# Patient Record
Sex: MALE | Race: WHITE | NOT HISPANIC OR LATINO | Employment: OTHER | URBAN - METROPOLITAN AREA
[De-identification: names, ages, dates, MRNs, and addresses within clinical notes are randomized per-mention and may not be internally consistent; named-entity substitution may affect disease eponyms.]

---

## 2023-03-27 ENCOUNTER — HOSPITAL ENCOUNTER (OUTPATIENT)
Dept: DATA CONVERSION | Facility: HOSPITAL | Age: 73
End: 2023-03-27
Attending: PSYCHIATRY & NEUROLOGY | Admitting: PSYCHIATRY & NEUROLOGY
Payer: MEDICARE

## 2023-03-27 DIAGNOSIS — Z00.6 ENCOUNTER FOR EXAMINATION FOR NORMAL COMPARISON AND CONTROL IN CLINICAL RESEARCH PROGRAM: ICD-10-CM

## 2023-06-05 ENCOUNTER — HOSPITAL ENCOUNTER (OUTPATIENT)
Dept: DATA CONVERSION | Facility: HOSPITAL | Age: 73
End: 2023-06-05
Attending: PSYCHIATRY & NEUROLOGY | Admitting: PSYCHIATRY & NEUROLOGY
Payer: MEDICARE

## 2023-06-05 DIAGNOSIS — Z00.6 ENCOUNTER FOR EXAMINATION FOR NORMAL COMPARISON AND CONTROL IN CLINICAL RESEARCH PROGRAM: ICD-10-CM

## 2023-06-19 ENCOUNTER — HOSPITAL ENCOUNTER (OUTPATIENT)
Dept: DATA CONVERSION | Facility: HOSPITAL | Age: 73
End: 2023-06-19
Attending: PSYCHIATRY & NEUROLOGY | Admitting: PSYCHIATRY & NEUROLOGY
Payer: MEDICARE

## 2023-06-19 DIAGNOSIS — Z00.6 ENCOUNTER FOR EXAMINATION FOR NORMAL COMPARISON AND CONTROL IN CLINICAL RESEARCH PROGRAM: ICD-10-CM

## 2023-06-28 LAB
ACTIVATED PARTIAL THROMBOPLASTIN TIME IN PPP BY COAGULATION ASSAY: 29 SEC (ref 27–38)
BASOPHILS (10*3/UL) IN BLOOD BY AUTOMATED COUNT: 0.04 X10E9/L (ref 0–0.1)
BASOPHILS/100 LEUKOCYTES IN BLOOD BY AUTOMATED COUNT: 0.5 % (ref 0–2)
EOSINOPHILS (10*3/UL) IN BLOOD BY AUTOMATED COUNT: 0.07 X10E9/L (ref 0–0.4)
EOSINOPHILS/100 LEUKOCYTES IN BLOOD BY AUTOMATED COUNT: 0.9 % (ref 0–6)
ERYTHROCYTE DISTRIBUTION WIDTH (RATIO) BY AUTOMATED COUNT: 12.8 % (ref 11.5–14.5)
ERYTHROCYTE MEAN CORPUSCULAR HEMOGLOBIN CONCENTRATION (G/DL) BY AUTOMATED: 33.3 G/DL (ref 32–36)
ERYTHROCYTE MEAN CORPUSCULAR VOLUME (FL) BY AUTOMATED COUNT: 90 FL (ref 80–100)
ERYTHROCYTES (10*6/UL) IN BLOOD BY AUTOMATED COUNT: 4.72 X10E12/L (ref 4.5–5.9)
HEMATOCRIT (%) IN BLOOD BY AUTOMATED COUNT: 42.7 % (ref 41–52)
HEMOGLOBIN (G/DL) IN BLOOD: 14.2 G/DL (ref 13.5–17.5)
IMMATURE GRANULOCYTES/100 LEUKOCYTES IN BLOOD BY AUTOMATED COUNT: 0.1 % (ref 0–0.9)
INR IN PPP BY COAGULATION ASSAY: 1 (ref 0.9–1.1)
LEUKOCYTES (10*3/UL) IN BLOOD BY AUTOMATED COUNT: 7.7 X10E9/L (ref 4.4–11.3)
LYMPHOCYTES (10*3/UL) IN BLOOD BY AUTOMATED COUNT: 2.19 X10E9/L (ref 0.8–3)
LYMPHOCYTES/100 LEUKOCYTES IN BLOOD BY AUTOMATED COUNT: 28.5 % (ref 13–44)
MONOCYTES (10*3/UL) IN BLOOD BY AUTOMATED COUNT: 0.68 X10E9/L (ref 0.05–0.8)
MONOCYTES/100 LEUKOCYTES IN BLOOD BY AUTOMATED COUNT: 8.9 % (ref 2–10)
NEUTROPHILS (10*3/UL) IN BLOOD BY AUTOMATED COUNT: 4.69 X10E9/L (ref 1.6–5.5)
NEUTROPHILS/100 LEUKOCYTES IN BLOOD BY AUTOMATED COUNT: 61.1 % (ref 40–80)
PLATELETS (10*3/UL) IN BLOOD AUTOMATED COUNT: 185 X10E9/L (ref 150–450)
PROTHROMBIN TIME (PT) IN PPP BY COAGULATION ASSAY: 11.6 SEC (ref 9.8–12.8)

## 2023-06-29 LAB
CELLS COUNTED TOTAL IN CEREBRAL SPINAL FLUID: 16
CLARITY CEREBRAL SPINAL FLUID: CLEAR
COLOR OF CEREBRAL SPINAL FLUID: COLORLESS
COLOR OF SUPERNATANT CEREBRAL SPINAL FLUID: COLORLESS
ERYTHROCYTES (/UL)  IN CEREBRAL SPINAL FLUID: 4 /UL (ref 0–5)
GLUCOSE (MG/DL) IN CEREBRAL SPINAL FLUID: 69 MG/DL (ref 40–70)
LEUKOCYTES (/UL) IN CEREBRAL SPINAL FLUID: 1 /UL (ref 0–5)
LYMPHOCYTES/100 LEUKOCYTES IN CSF BY MANUAL COUNT: 63 %
MONO/MACROPHAGE/100 LEUKOCYTES IN CSF BY MANUAL COUNT: 38 %
PROTEIN (MG/DL) IN CSF: 54 MG/DL (ref 15–45)
TUBE NUMBER OF CEREBRAL SPINAL FLUID: NORMAL

## 2023-07-10 ENCOUNTER — HOSPITAL ENCOUNTER (OUTPATIENT)
Dept: PREADMISSION TESTING | Age: 73
Discharge: HOME | End: 2023-07-14

## 2023-07-10 VITALS
WEIGHT: 218 LBS | OXYGEN SATURATION: 98 % | HEART RATE: 53 BPM | RESPIRATION RATE: 16 BRPM | TEMPERATURE: 98 F | HEIGHT: 74 IN | DIASTOLIC BLOOD PRESSURE: 74 MMHG | SYSTOLIC BLOOD PRESSURE: 134 MMHG | BODY MASS INDEX: 27.98 KG/M2

## 2023-07-17 ENCOUNTER — ANESTHESIA EVENT (OUTPATIENT)
Dept: OPERATING ROOM | Age: 73
End: 2023-07-17
Payer: MEDICARE

## 2023-07-17 ENCOUNTER — ANESTHESIA (OUTPATIENT)
Dept: OPERATING ROOM | Age: 73
End: 2023-07-17
Payer: MEDICARE

## 2023-07-17 ENCOUNTER — HOSPITAL ENCOUNTER (OUTPATIENT)
Age: 73
Setting detail: OUTPATIENT SURGERY
Discharge: HOME | End: 2023-07-17
Attending: ORTHOPAEDIC SURGERY | Admitting: ORTHOPAEDIC SURGERY
Payer: MEDICARE

## 2023-07-17 VITALS
RESPIRATION RATE: 16 BRPM | HEIGHT: 74 IN | OXYGEN SATURATION: 99 % | SYSTOLIC BLOOD PRESSURE: 146 MMHG | TEMPERATURE: 97 F | DIASTOLIC BLOOD PRESSURE: 74 MMHG | WEIGHT: 218 LBS | BODY MASS INDEX: 27.98 KG/M2 | HEART RATE: 65 BPM

## 2023-07-17 PROCEDURE — 7100000010 HC PHASE II RECOVERY - FIRST 15 MIN: Performed by: ORTHOPAEDIC SURGERY

## 2023-07-17 PROCEDURE — 6370000000 HC RX 637 (ALT 250 FOR IP): Performed by: ANESTHESIOLOGY

## 2023-07-17 PROCEDURE — 3600000004 HC SURGERY LEVEL 4 BASE: Performed by: ORTHOPAEDIC SURGERY

## 2023-07-17 PROCEDURE — 6360000002 HC RX W HCPCS: Performed by: ANESTHESIOLOGY

## 2023-07-17 PROCEDURE — 7100000011 HC PHASE II RECOVERY - ADDTL 15 MIN: Performed by: ORTHOPAEDIC SURGERY

## 2023-07-17 PROCEDURE — 3700000001 HC ADD 15 MINUTES (ANESTHESIA): Performed by: ORTHOPAEDIC SURGERY

## 2023-07-17 PROCEDURE — 2709999900 HC NON-CHARGEABLE SUPPLY: Performed by: ORTHOPAEDIC SURGERY

## 2023-07-17 PROCEDURE — 2580000003 HC RX 258: Performed by: ANESTHESIOLOGY

## 2023-07-17 PROCEDURE — 7100000001 HC PACU RECOVERY - ADDTL 15 MIN: Performed by: ORTHOPAEDIC SURGERY

## 2023-07-17 PROCEDURE — 6360000002 HC RX W HCPCS: Performed by: ORTHOPAEDIC SURGERY

## 2023-07-17 PROCEDURE — 3600000014 HC SURGERY LEVEL 4 ADDTL 15MIN: Performed by: ORTHOPAEDIC SURGERY

## 2023-07-17 PROCEDURE — 2720000010 HC SURG SUPPLY STERILE: Performed by: ORTHOPAEDIC SURGERY

## 2023-07-17 PROCEDURE — 3700000000 HC ANESTHESIA ATTENDED CARE: Performed by: ORTHOPAEDIC SURGERY

## 2023-07-17 PROCEDURE — 7100000000 HC PACU RECOVERY - FIRST 15 MIN: Performed by: ORTHOPAEDIC SURGERY

## 2023-07-17 PROCEDURE — C1713 ANCHOR/SCREW BN/BN,TIS/BN: HCPCS | Performed by: ORTHOPAEDIC SURGERY

## 2023-07-17 PROCEDURE — 2580000003 HC RX 258: Performed by: ORTHOPAEDIC SURGERY

## 2023-07-17 PROCEDURE — 64447 NJX AA&/STRD FEMORAL NRV IMG: CPT | Performed by: ANESTHESIOLOGY

## 2023-07-17 DEVICE — ANCHOR SUTURE BIOCOMP 4.75X19.1 MM SWIVELOCK C: Type: IMPLANTABLE DEVICE | Site: LEG | Status: FUNCTIONAL

## 2023-07-17 DEVICE — DEVICE GRFT FIX 4.75X19.1 MM ANCHR IMPL BIOCOMP SWIVELOCK: Type: IMPLANTABLE DEVICE | Site: LEG | Status: FUNCTIONAL

## 2023-07-17 RX ORDER — LABETALOL HYDROCHLORIDE 5 MG/ML
10 INJECTION, SOLUTION INTRAVENOUS
Status: DISCONTINUED | OUTPATIENT
Start: 2023-07-17 | End: 2023-07-17 | Stop reason: HOSPADM

## 2023-07-17 RX ORDER — DEXAMETHASONE SODIUM PHOSPHATE 10 MG/ML
INJECTION INTRAMUSCULAR; INTRAVENOUS PRN
Status: DISCONTINUED | OUTPATIENT
Start: 2023-07-17 | End: 2023-07-17 | Stop reason: SDUPTHER

## 2023-07-17 RX ORDER — OXYCODONE HYDROCHLORIDE 5 MG/1
10 TABLET ORAL PRN
Status: COMPLETED | OUTPATIENT
Start: 2023-07-17 | End: 2023-07-17

## 2023-07-17 RX ORDER — MIDAZOLAM HYDROCHLORIDE 1 MG/ML
INJECTION INTRAMUSCULAR; INTRAVENOUS PRN
Status: DISCONTINUED | OUTPATIENT
Start: 2023-07-17 | End: 2023-07-17 | Stop reason: SDUPTHER

## 2023-07-17 RX ORDER — ROPIVACAINE HYDROCHLORIDE 5 MG/ML
INJECTION, SOLUTION EPIDURAL; INFILTRATION; PERINEURAL
Status: COMPLETED | OUTPATIENT
Start: 2023-07-17 | End: 2023-07-17

## 2023-07-17 RX ORDER — FENTANYL CITRATE 50 UG/ML
INJECTION, SOLUTION INTRAMUSCULAR; INTRAVENOUS PRN
Status: DISCONTINUED | OUTPATIENT
Start: 2023-07-17 | End: 2023-07-17 | Stop reason: SDUPTHER

## 2023-07-17 RX ORDER — ONDANSETRON 2 MG/ML
4 INJECTION INTRAMUSCULAR; INTRAVENOUS
Status: DISCONTINUED | OUTPATIENT
Start: 2023-07-17 | End: 2023-07-17 | Stop reason: HOSPADM

## 2023-07-17 RX ORDER — DEXTROSE MONOHYDRATE 100 MG/ML
INJECTION, SOLUTION INTRAVENOUS CONTINUOUS PRN
Status: DISCONTINUED | OUTPATIENT
Start: 2023-07-17 | End: 2023-07-17 | Stop reason: HOSPADM

## 2023-07-17 RX ORDER — OXYCODONE HYDROCHLORIDE 5 MG/1
5 TABLET ORAL PRN
Status: COMPLETED | OUTPATIENT
Start: 2023-07-17 | End: 2023-07-17

## 2023-07-17 RX ORDER — SODIUM CHLORIDE 0.9 % (FLUSH) 0.9 %
5-40 SYRINGE (ML) INJECTION PRN
Status: DISCONTINUED | OUTPATIENT
Start: 2023-07-17 | End: 2023-07-17 | Stop reason: HOSPADM

## 2023-07-17 RX ORDER — FENTANYL CITRATE 0.05 MG/ML
25 INJECTION, SOLUTION INTRAMUSCULAR; INTRAVENOUS EVERY 5 MIN PRN
Status: DISCONTINUED | OUTPATIENT
Start: 2023-07-17 | End: 2023-07-17 | Stop reason: HOSPADM

## 2023-07-17 RX ORDER — SODIUM CHLORIDE, SODIUM LACTATE, POTASSIUM CHLORIDE, CALCIUM CHLORIDE 600; 310; 30; 20 MG/100ML; MG/100ML; MG/100ML; MG/100ML
INJECTION, SOLUTION INTRAVENOUS
Status: DISCONTINUED
Start: 2023-07-17 | End: 2023-07-17 | Stop reason: HOSPADM

## 2023-07-17 RX ORDER — SODIUM CHLORIDE 0.9 % (FLUSH) 0.9 %
5-40 SYRINGE (ML) INJECTION EVERY 12 HOURS SCHEDULED
Status: DISCONTINUED | OUTPATIENT
Start: 2023-07-17 | End: 2023-07-17 | Stop reason: HOSPADM

## 2023-07-17 RX ORDER — SODIUM CHLORIDE 9 MG/ML
INJECTION, SOLUTION INTRAVENOUS PRN
Status: DISCONTINUED | OUTPATIENT
Start: 2023-07-17 | End: 2023-07-17 | Stop reason: HOSPADM

## 2023-07-17 RX ORDER — PROPOFOL 10 MG/ML
INJECTION, EMULSION INTRAVENOUS PRN
Status: DISCONTINUED | OUTPATIENT
Start: 2023-07-17 | End: 2023-07-17 | Stop reason: SDUPTHER

## 2023-07-17 RX ORDER — SODIUM CHLORIDE, SODIUM LACTATE, POTASSIUM CHLORIDE, CALCIUM CHLORIDE 600; 310; 30; 20 MG/100ML; MG/100ML; MG/100ML; MG/100ML
INJECTION, SOLUTION INTRAVENOUS CONTINUOUS
Status: DISCONTINUED | OUTPATIENT
Start: 2023-07-17 | End: 2023-07-17 | Stop reason: HOSPADM

## 2023-07-17 RX ORDER — METOCLOPRAMIDE HYDROCHLORIDE 5 MG/ML
10 INJECTION INTRAMUSCULAR; INTRAVENOUS
Status: DISCONTINUED | OUTPATIENT
Start: 2023-07-17 | End: 2023-07-17 | Stop reason: HOSPADM

## 2023-07-17 RX ORDER — IPRATROPIUM BROMIDE AND ALBUTEROL SULFATE 2.5; .5 MG/3ML; MG/3ML
1 SOLUTION RESPIRATORY (INHALATION)
Status: DISCONTINUED | OUTPATIENT
Start: 2023-07-17 | End: 2023-07-17 | Stop reason: HOSPADM

## 2023-07-17 RX ORDER — MEPERIDINE HYDROCHLORIDE 25 MG/ML
12.5 INJECTION INTRAMUSCULAR; INTRAVENOUS; SUBCUTANEOUS EVERY 5 MIN PRN
Status: DISCONTINUED | OUTPATIENT
Start: 2023-07-17 | End: 2023-07-17 | Stop reason: HOSPADM

## 2023-07-17 RX ORDER — GLUCAGON 1 MG/ML
1 KIT INJECTION PRN
Status: DISCONTINUED | OUTPATIENT
Start: 2023-07-17 | End: 2023-07-17 | Stop reason: HOSPADM

## 2023-07-17 RX ORDER — HYDRALAZINE HYDROCHLORIDE 20 MG/ML
10 INJECTION INTRAMUSCULAR; INTRAVENOUS
Status: DISCONTINUED | OUTPATIENT
Start: 2023-07-17 | End: 2023-07-17 | Stop reason: HOSPADM

## 2023-07-17 RX ADMIN — DEXAMETHASONE SODIUM PHOSPHATE 10 MG: 10 INJECTION INTRAMUSCULAR; INTRAVENOUS at 10:03

## 2023-07-17 RX ADMIN — PROPOFOL 200 MG: 10 INJECTION, EMULSION INTRAVENOUS at 09:54

## 2023-07-17 RX ADMIN — OXYCODONE HYDROCHLORIDE 5 MG: 5 TABLET ORAL at 13:56

## 2023-07-17 RX ADMIN — SODIUM CHLORIDE, POTASSIUM CHLORIDE, SODIUM LACTATE AND CALCIUM CHLORIDE: 600; 310; 30; 20 INJECTION, SOLUTION INTRAVENOUS at 08:47

## 2023-07-17 RX ADMIN — FENTANYL CITRATE 50 MCG: 50 INJECTION, SOLUTION INTRAMUSCULAR; INTRAVENOUS at 09:54

## 2023-07-17 RX ADMIN — FENTANYL CITRATE 50 MCG: 50 INJECTION, SOLUTION INTRAMUSCULAR; INTRAVENOUS at 11:00

## 2023-07-17 RX ADMIN — HYDROMORPHONE HYDROCHLORIDE 0.5 MG: 1 INJECTION, SOLUTION INTRAMUSCULAR; INTRAVENOUS; SUBCUTANEOUS at 11:45

## 2023-07-17 RX ADMIN — FENTANYL CITRATE 50 MCG: 50 INJECTION, SOLUTION INTRAMUSCULAR; INTRAVENOUS at 10:10

## 2023-07-17 RX ADMIN — HYDROMORPHONE HYDROCHLORIDE 0.5 MG: 1 INJECTION, SOLUTION INTRAMUSCULAR; INTRAVENOUS; SUBCUTANEOUS at 11:57

## 2023-07-17 RX ADMIN — FENTANYL CITRATE 50 MCG: 50 INJECTION, SOLUTION INTRAMUSCULAR; INTRAVENOUS at 11:17

## 2023-07-17 RX ADMIN — FENTANYL CITRATE 25 MCG: 0.05 INJECTION, SOLUTION INTRAMUSCULAR; INTRAVENOUS at 12:19

## 2023-07-17 RX ADMIN — MIDAZOLAM HYDROCHLORIDE 2 MG: 1 INJECTION, SOLUTION INTRAMUSCULAR; INTRAVENOUS at 09:35

## 2023-07-17 RX ADMIN — ROPIVACAINE HYDROCHLORIDE 25 ML: 5 INJECTION, SOLUTION EPIDURAL; INFILTRATION; PERINEURAL at 09:30

## 2023-07-17 RX ADMIN — CEFAZOLIN 2000 MG: 2 INJECTION, POWDER, FOR SOLUTION INTRAMUSCULAR; INTRAVENOUS at 10:01

## 2023-07-17 ASSESSMENT — PAIN SCALES - GENERAL
PAINLEVEL_OUTOF10: 7
PAINLEVEL_OUTOF10: 4
PAINLEVEL_OUTOF10: 4
PAINLEVEL_OUTOF10: 8
PAINLEVEL_OUTOF10: 7
PAINLEVEL_OUTOF10: 5
PAINLEVEL_OUTOF10: 7
PAINLEVEL_OUTOF10: 4
PAINLEVEL_OUTOF10: 4
PAINLEVEL_OUTOF10: 0
PAINLEVEL_OUTOF10: 4
PAINLEVEL_OUTOF10: 6

## 2023-07-17 ASSESSMENT — PAIN DESCRIPTION - ORIENTATION
ORIENTATION: LEFT

## 2023-07-17 ASSESSMENT — PAIN DESCRIPTION - LOCATION
LOCATION: KNEE
LOCATION: LEG
LOCATION: KNEE

## 2023-07-17 ASSESSMENT — PAIN DESCRIPTION - DESCRIPTORS
DESCRIPTORS: SORE

## 2023-07-17 NOTE — PROGRESS NOTES
Pt states pain \"is tolerable now\" at \"4\" on scale. \" Left knee dressing clean and dry, ice and immobilizer maintained, Left pedal 2+. Left toes pink warm and mobile. Pt states left leg feels \"not numb not normal a little tingly. \"

## 2023-07-17 NOTE — OP NOTE
Operative Note      Patient: Sherren Cook  YOB: 1950  MRN: [id number]    Date of Procedure: 7/17/2023    Pre-Op Diagnosis Codes:     * Quadriceps muscle strain, left, initial encounter [K03.013F]    Post-Op Diagnosis: Same       Procedure(s):  LEFT KNEE OPEN QUADRICEPS TENDON REPAIR. Surgeon(s):  Charis Fisher MD    Assistant:   Physician Assistant: Christina Capps PA-C    Anesthesia: General    Estimated Blood Loss (mL): less than 50     Complications: None    Specimens:   * No specimens in log *    Implants:  Implant Name Type Inv. Item Serial No.  Lot No. LRB No. Used Action   DEVICE GRFT FIX 4.75X19.1 MM [facility] [id number]  DEVICE GRFT FIX 4.75X19.1 MM [facility] IMPL [employer] [id number] Left 1 Implanted   [facility] SUTURE BIOCOMP 4.75X19.1 MM [employer] [id number]  [facility] SUTURE BIOCOMP 4.75X19.1 MM [employer] [id number] Left 1 Implanted   [facility] SUTURE BIOCOMP 4.75X19.1 MM Raymona Grimaldo [id number]  [facility] SUTURE BIOCOMP 4.75X19.1 MM [employer] [id number] Left 1 Implanted         Drains: * No LDAs found *    Findings: Complete quadriceps tendon rupture        Detailed Description of Procedure: Indications: This is a 68-year-old male who sustained a left quadriceps tendon rupture. An MRI confirmed this along with the clinical exam.  Because of the nature of the injury a quadriceps tendon repair was offered. The procedure was explained along the risks, benefits and alternatives being reviewed. Potential risk including not limited to infection, neurovascular complication, failure of the repair, arthrofibrosis as well as pain and the potential need for reoperation were all discussed with the patient and they consented the procedure. Operative procedure: And adductor canal block was administered per anesthesia. The patient was brought the operative suite placed in supine position.   A general anesthetic was

## 2023-07-17 NOTE — ANESTHESIA PROCEDURE NOTES
Peripheral Block    Patient location during procedure: pre-op  Reason for block: post-op pain management and at surgeon's request  Start time: 7/17/2023 9:30 AM  End time: 7/17/2023 9:40 AM  Staffing  Performed: anesthesiologist   Anesthesiologist: Mony Mccormack MD  Preanesthetic Checklist  Completed: patient identified, IV checked, site marked, risks and benefits discussed, surgical/procedural consents, equipment checked, pre-op evaluation, timeout performed, anesthesia consent given, oxygen available and monitors applied/VS acknowledged  Peripheral Block   Patient position: supine  Prep: ChloraPrep  Provider prep: mask and sterile gloves (Sterile probe cover)  Patient monitoring: cardiac monitor, continuous pulse ox, frequent blood pressure checks and IV access  Block type: Femoral  Adductor canal  Laterality: left  Injection technique: single-shot  Guidance: nerve stimulator and ultrasound guided  Local infiltration: ropivacaine  Infiltration strength: 0.5 %  Local infiltration: ropivacaine  Dose: 25 mL    Needle   Needle type: combined needle/nerve stimulator   Needle gauge: 22 G  Needle localization: anatomical landmarks and ultrasound guidance  Needle length: 5 cm  Assessment   Injection assessment: negative aspiration for heme, no paresthesia on injection and local visualized surrounding nerve on ultrasound  Paresthesia pain: immediately resolved  Slow fractionated injection: yes  Hemodynamics: stable  Real-time US image taken/store: yes    Additional Notes  Ultrasound image printed and saved in patient chart.     Sterile probe cover used    Medications Administered  ropivacaine (NAROPIN) injection 0.5% - Perineural   25 mL - 7/17/2023 9:30:00 AM

## 2023-07-17 NOTE — ANESTHESIA POSTPROCEDURE EVALUATION
Department of Anesthesiology  Postprocedure Note    Patient: Asher Maria  MRN: [id number]  YOB: 1950  Date of evaluation: 7/17/2023      Procedure Summary     Date: 07/17/23 Room / Location: [address]    Anesthesia Start: 1302 Anesthesia Stop: 3639    Procedure: LEFT KNEE OPEN [address]  (Left) Diagnosis:       Quadriceps muscle strain, left, initial encounter      (Quadriceps muscle strain, left, initial encounter [Z82.648O])    Surgeons: Oswaldo Cosme MD Responsible Provider: Manuela Contreras MD    Anesthesia Type: General, Regional ASA Status: 2          Anesthesia Type: General, Regional    Corinne Phase I:      Corinne Phase II:        Anesthesia Post Evaluation    Patient location during evaluation: bedside  Patient participation: complete - patient participated  Level of consciousness: awake and awake and alert  Airway patency: patent  Nausea & Vomiting: no nausea and no vomiting  Complications: no  Cardiovascular status: blood pressure returned to baseline and hemodynamically stable  Respiratory status: acceptable  Hydration status: euvolemic

## 2023-07-17 NOTE — DISCHARGE INSTRUCTIONS
Medication given may have significant effects after discharge. Therefore on the day of surgery:  1) you must be accompanied by a responsible adult upon discharge and for 24 hours after surgery. Do not drive a motor vehicle, operate machinery, power tools or appliance, drink alcoholic beverages, or make critical decisions for 24 hours  2) Be aware of dizziness, which may cause a fall. Change positions slowly. 3) Eating: you may resume your regular diet but it is better to increase intake slowly with mild foods and working up to your regular diet. No greasy, fried or spicy foods today. 4) Nausea/Vomiting: Nausea and vomiting may occur as you become more active or begin to increase food intake. If this should happen, decrease activity and return to liquids. If the problem persists, call your surgeon  5) Pain: Your surgeon may have given you a prescription for pain medication. Take pain medication with food as prescribed. Pain medication may cause constipation, so drink plenty of fluids. If your pain medication does not provide adequate relief, call your surgeon  6) Urinating: Notify your surgeon if you have not urinated within 12 hours after discharge  7) Ice: Apply ice to operative site for 20 min 5-6 times a day or use Polar care as instructed  8) Dressing:   []   Remove dressing in 24hr    []  Remove dressing in 48hr   []  Leave open to air after initial dressing is taken off and incision is dry  Do not remove the steri-strips. (no bath/ hot tubs/ pools)   []  Leave dressing in place.  Keep dressing/ incision clean and dry    9) Activity    Shoulder/ elbow/Hand   []  Elevate extremity    []  Sling   [] at all times (except for exercises and showering)  [] as needed only for comfort   [] Begin daily motion exercises out of sling as instructed   []  Bend and flex fingers/ wrist/elbow frequently   [] other   Knee/ Ankle/ Foot   [] elevate extremity   [] crutches        [] non-weight bearing to operative extremity

## 2023-07-17 NOTE — H&P
Interval History and Physical    I have interviewed and examined the patient and reviewed the recent History and Physical.  There have been no changes to the recent H&P documentation. The patient understands the planned operation and its associated risks and benefits and agrees to proceed. The surgical consent form has been signed. There were no vitals taken for this visit.      Electronically signed by Betsy Silva MD on 7/17/2023 at 8:17 AM

## 2023-08-14 ENCOUNTER — TELEPHONE (OUTPATIENT)
Dept: PRIMARY CARE | Facility: CLINIC | Age: 73
End: 2023-08-14
Payer: MEDICARE

## 2023-08-14 DIAGNOSIS — Z11.59 ENCOUNTER FOR HEPATITIS C SCREENING TEST FOR LOW RISK PATIENT: Primary | ICD-10-CM

## 2023-08-14 DIAGNOSIS — Z11.4 ENCOUNTER FOR SCREENING FOR HIV: ICD-10-CM

## 2023-08-14 DIAGNOSIS — E78.5 BORDERLINE HYPERLIPIDEMIA: ICD-10-CM

## 2023-08-14 DIAGNOSIS — Z12.5 PROSTATE CANCER SCREENING: ICD-10-CM

## 2023-08-14 PROBLEM — J32.9 SINOBRONCHITIS: Status: ACTIVE | Noted: 2023-08-14

## 2023-08-14 PROBLEM — J18.9 LEFT LOWER LOBE PNEUMONIA: Status: RESOLVED | Noted: 2023-08-14 | Resolved: 2023-08-14

## 2023-08-14 PROBLEM — S76.119A RUPTURE OF QUADRICEPS TENDON: Status: RESOLVED | Noted: 2023-08-14 | Resolved: 2023-08-14

## 2023-08-14 PROBLEM — J40 SINOBRONCHITIS: Status: ACTIVE | Noted: 2023-08-14

## 2023-08-16 ENCOUNTER — LAB (OUTPATIENT)
Dept: LAB | Facility: LAB | Age: 73
End: 2023-08-16
Payer: MEDICARE

## 2023-08-16 DIAGNOSIS — E78.5 BORDERLINE HYPERLIPIDEMIA: ICD-10-CM

## 2023-08-16 DIAGNOSIS — G89.29 CHRONIC LOW BACK PAIN, UNSPECIFIED BACK PAIN LATERALITY, UNSPECIFIED WHETHER SCIATICA PRESENT: Primary | ICD-10-CM

## 2023-08-16 DIAGNOSIS — Z12.5 PROSTATE CANCER SCREENING: ICD-10-CM

## 2023-08-16 DIAGNOSIS — M54.50 CHRONIC LOW BACK PAIN, UNSPECIFIED BACK PAIN LATERALITY, UNSPECIFIED WHETHER SCIATICA PRESENT: Primary | ICD-10-CM

## 2023-08-16 DIAGNOSIS — Z11.4 ENCOUNTER FOR SCREENING FOR HIV: ICD-10-CM

## 2023-08-16 DIAGNOSIS — Z11.59 ENCOUNTER FOR HEPATITIS C SCREENING TEST FOR LOW RISK PATIENT: ICD-10-CM

## 2023-08-16 LAB
ALANINE AMINOTRANSFERASE (SGPT) (U/L) IN SER/PLAS: 11 U/L (ref 10–52)
ALBUMIN (G/DL) IN SER/PLAS: 3.7 G/DL (ref 3.4–5)
ALKALINE PHOSPHATASE (U/L) IN SER/PLAS: 67 U/L (ref 33–136)
ANION GAP IN SER/PLAS: 9 MMOL/L (ref 10–20)
ASPARTATE AMINOTRANSFERASE (SGOT) (U/L) IN SER/PLAS: 24 U/L (ref 9–39)
BILIRUBIN TOTAL (MG/DL) IN SER/PLAS: 0.7 MG/DL (ref 0–1.2)
CALCIUM (MG/DL) IN SER/PLAS: 8.9 MG/DL (ref 8.6–10.3)
CARBON DIOXIDE, TOTAL (MMOL/L) IN SER/PLAS: 30 MMOL/L (ref 21–32)
CHLORIDE (MMOL/L) IN SER/PLAS: 106 MMOL/L (ref 98–107)
CHOLESTEROL (MG/DL) IN SER/PLAS: 155 MG/DL (ref 0–199)
CHOLESTEROL IN HDL (MG/DL) IN SER/PLAS: 45.6 MG/DL
CHOLESTEROL/HDL RATIO: 3.4
CREATININE (MG/DL) IN SER/PLAS: 1 MG/DL (ref 0.5–1.3)
ERYTHROCYTE DISTRIBUTION WIDTH (RATIO) BY AUTOMATED COUNT: 12.8 % (ref 11.5–14.5)
ERYTHROCYTE MEAN CORPUSCULAR HEMOGLOBIN CONCENTRATION (G/DL) BY AUTOMATED: 33.7 G/DL (ref 32–36)
ERYTHROCYTE MEAN CORPUSCULAR VOLUME (FL) BY AUTOMATED COUNT: 92 FL (ref 80–100)
ERYTHROCYTES (10*6/UL) IN BLOOD BY AUTOMATED COUNT: 4.42 X10E12/L (ref 4.5–5.9)
GFR MALE: 78 ML/MIN/1.73M2
GLUCOSE (MG/DL) IN SER/PLAS: 110 MG/DL (ref 74–99)
HEMATOCRIT (%) IN BLOOD BY AUTOMATED COUNT: 40.6 % (ref 41–52)
HEMOGLOBIN (G/DL) IN BLOOD: 13.7 G/DL (ref 13.5–17.5)
HEPATITIS C VIRUS AB PRESENCE IN SERUM: NONREACTIVE
HIV 1/ 2 AG/AB SCREEN: NONREACTIVE
LDL: 99 MG/DL (ref 0–99)
LEUKOCYTES (10*3/UL) IN BLOOD BY AUTOMATED COUNT: 4.6 X10E9/L (ref 4.4–11.3)
PLATELETS (10*3/UL) IN BLOOD AUTOMATED COUNT: 152 X10E9/L (ref 150–450)
POTASSIUM (MMOL/L) IN SER/PLAS: 4.1 MMOL/L (ref 3.5–5.3)
PROSTATE SPECIFIC AG (NG/ML) IN SER/PLAS: 2.52 NG/ML (ref 0–4)
PROTEIN TOTAL: 6 G/DL (ref 6.4–8.2)
SODIUM (MMOL/L) IN SER/PLAS: 141 MMOL/L (ref 136–145)
TRIGLYCERIDE (MG/DL) IN SER/PLAS: 54 MG/DL (ref 0–149)
UREA NITROGEN (MG/DL) IN SER/PLAS: 20 MG/DL (ref 6–23)
VLDL: 11 MG/DL (ref 0–40)

## 2023-08-16 PROCEDURE — 87389 HIV-1 AG W/HIV-1&-2 AB AG IA: CPT

## 2023-08-16 PROCEDURE — 80053 COMPREHEN METABOLIC PANEL: CPT

## 2023-08-16 PROCEDURE — 86803 HEPATITIS C AB TEST: CPT

## 2023-08-16 PROCEDURE — 85027 COMPLETE CBC AUTOMATED: CPT

## 2023-08-16 PROCEDURE — 36415 COLL VENOUS BLD VENIPUNCTURE: CPT

## 2023-08-16 PROCEDURE — G0103 PSA SCREENING: HCPCS

## 2023-08-16 PROCEDURE — 80061 LIPID PANEL: CPT

## 2023-08-21 ENCOUNTER — OFFICE VISIT (OUTPATIENT)
Dept: PRIMARY CARE | Facility: CLINIC | Age: 73
End: 2023-08-21
Payer: MEDICARE

## 2023-08-21 VITALS
RESPIRATION RATE: 16 BRPM | BODY MASS INDEX: 28.17 KG/M2 | DIASTOLIC BLOOD PRESSURE: 76 MMHG | HEART RATE: 56 BPM | TEMPERATURE: 98.1 F | OXYGEN SATURATION: 94 % | SYSTOLIC BLOOD PRESSURE: 122 MMHG | WEIGHT: 219.5 LBS | HEIGHT: 74 IN

## 2023-08-21 DIAGNOSIS — Z00.00 ROUTINE GENERAL MEDICAL EXAMINATION AT HEALTH CARE FACILITY: Primary | ICD-10-CM

## 2023-08-21 DIAGNOSIS — S76.112D RUPTURE OF LEFT QUADRICEPS TENDON, SUBSEQUENT ENCOUNTER: ICD-10-CM

## 2023-08-21 PROBLEM — M75.100 TORN ROTATOR CUFF: Status: RESOLVED | Noted: 2023-08-21 | Resolved: 2023-08-21

## 2023-08-21 PROCEDURE — G0439 PPPS, SUBSEQ VISIT: HCPCS | Performed by: STUDENT IN AN ORGANIZED HEALTH CARE EDUCATION/TRAINING PROGRAM

## 2023-08-21 PROCEDURE — 1170F FXNL STATUS ASSESSED: CPT | Performed by: STUDENT IN AN ORGANIZED HEALTH CARE EDUCATION/TRAINING PROGRAM

## 2023-08-21 PROCEDURE — 1159F MED LIST DOCD IN RCRD: CPT | Performed by: STUDENT IN AN ORGANIZED HEALTH CARE EDUCATION/TRAINING PROGRAM

## 2023-08-21 PROCEDURE — 1160F RVW MEDS BY RX/DR IN RCRD: CPT | Performed by: STUDENT IN AN ORGANIZED HEALTH CARE EDUCATION/TRAINING PROGRAM

## 2023-08-21 PROCEDURE — 1036F TOBACCO NON-USER: CPT | Performed by: STUDENT IN AN ORGANIZED HEALTH CARE EDUCATION/TRAINING PROGRAM

## 2023-08-21 SDOH — HEALTH STABILITY: PHYSICAL HEALTH: ON AVERAGE, HOW MANY DAYS PER WEEK DO YOU ENGAGE IN MODERATE TO STRENUOUS EXERCISE (LIKE A BRISK WALK)?: 5 DAYS

## 2023-08-21 SDOH — ECONOMIC STABILITY: FOOD INSECURITY: WITHIN THE PAST 12 MONTHS, YOU WORRIED THAT YOUR FOOD WOULD RUN OUT BEFORE YOU GOT MONEY TO BUY MORE.: NEVER TRUE

## 2023-08-21 SDOH — ECONOMIC STABILITY: HOUSING INSECURITY
IN THE LAST 12 MONTHS, WAS THERE A TIME WHEN YOU DID NOT HAVE A STEADY PLACE TO SLEEP OR SLEPT IN A SHELTER (INCLUDING NOW)?: NO

## 2023-08-21 SDOH — ECONOMIC STABILITY: INCOME INSECURITY: IN THE LAST 12 MONTHS, WAS THERE A TIME WHEN YOU WERE NOT ABLE TO PAY THE MORTGAGE OR RENT ON TIME?: NO

## 2023-08-21 SDOH — ECONOMIC STABILITY: TRANSPORTATION INSECURITY
IN THE PAST 12 MONTHS, HAS LACK OF TRANSPORTATION KEPT YOU FROM MEETINGS, WORK, OR FROM GETTING THINGS NEEDED FOR DAILY LIVING?: NO

## 2023-08-21 SDOH — ECONOMIC STABILITY: FOOD INSECURITY: WITHIN THE PAST 12 MONTHS, THE FOOD YOU BOUGHT JUST DIDN'T LAST AND YOU DIDN'T HAVE MONEY TO GET MORE.: NEVER TRUE

## 2023-08-21 SDOH — HEALTH STABILITY: PHYSICAL HEALTH: ON AVERAGE, HOW MANY MINUTES DO YOU ENGAGE IN EXERCISE AT THIS LEVEL?: 60 MIN

## 2023-08-21 SDOH — ECONOMIC STABILITY: TRANSPORTATION INSECURITY
IN THE PAST 12 MONTHS, HAS THE LACK OF TRANSPORTATION KEPT YOU FROM MEDICAL APPOINTMENTS OR FROM GETTING MEDICATIONS?: NO

## 2023-08-21 ASSESSMENT — SOCIAL DETERMINANTS OF HEALTH (SDOH)
WITHIN THE LAST YEAR, HAVE TO BEEN RAPED OR FORCED TO HAVE ANY KIND OF SEXUAL ACTIVITY BY YOUR PARTNER OR EX-PARTNER?: NO
DO YOU BELONG TO ANY CLUBS OR ORGANIZATIONS SUCH AS CHURCH GROUPS UNIONS, FRATERNAL OR ATHLETIC GROUPS, OR SCHOOL GROUPS?: YES
WITHIN THE LAST YEAR, HAVE YOU BEEN HUMILIATED OR EMOTIONALLY ABUSED IN OTHER WAYS BY YOUR PARTNER OR EX-PARTNER?: NO
WITHIN THE LAST YEAR, HAVE YOU BEEN KICKED, HIT, SLAPPED, OR OTHERWISE PHYSICALLY HURT BY YOUR PARTNER OR EX-PARTNER?: NO
IN THE PAST 12 MONTHS, HAS THE ELECTRIC, GAS, OIL, OR WATER COMPANY THREATENED TO SHUT OFF SERVICE IN YOUR HOME?: NO
HOW HARD IS IT FOR YOU TO PAY FOR THE VERY BASICS LIKE FOOD, HOUSING, MEDICAL CARE, AND HEATING?: NOT HARD AT ALL
HOW OFTEN DO YOU GET TOGETHER WITH FRIENDS OR RELATIVES?: THREE TIMES A WEEK
HOW OFTEN DO YOU ATTEND CHURCH OR RELIGIOUS SERVICES?: MORE THAN 4 TIMES PER YEAR
WITHIN THE LAST YEAR, HAVE YOU BEEN AFRAID OF YOUR PARTNER OR EX-PARTNER?: NO
HOW OFTEN DO YOU ATTENT MEETINGS OF THE CLUB OR ORGANIZATION YOU BELONG TO?: MORE THAN 4 TIMES PER YEAR
IN A TYPICAL WEEK, HOW MANY TIMES DO YOU TALK ON THE PHONE WITH FAMILY, FRIENDS, OR NEIGHBORS?: MORE THAN THREE TIMES A WEEK

## 2023-08-21 ASSESSMENT — ACTIVITIES OF DAILY LIVING (ADL)
GROCERY_SHOPPING: INDEPENDENT
TAKING_MEDICATION: INDEPENDENT
MANAGING_FINANCES: INDEPENDENT
DOING_HOUSEWORK: INDEPENDENT
DRESSING: INDEPENDENT
BATHING: INDEPENDENT

## 2023-08-21 ASSESSMENT — ENCOUNTER SYMPTOMS
OCCASIONAL FEELINGS OF UNSTEADINESS: 0
CONSTIPATION: 0
NAUSEA: 0
DEPRESSION: 0
FLANK PAIN: 0
BLOOD IN STOOL: 0
HEMATURIA: 0
DIARRHEA: 0
PALPITATIONS: 0
DIZZINESS: 0
SINUS PAIN: 0
ARTHRALGIAS: 0
FATIGUE: 0
LIGHT-HEADEDNESS: 0
SHORTNESS OF BREATH: 0
APPETITE CHANGE: 0
MYALGIAS: 0
VOMITING: 0
LOSS OF SENSATION IN FEET: 0
ABDOMINAL PAIN: 0
FEVER: 0
RHINORRHEA: 0

## 2023-08-21 ASSESSMENT — PATIENT HEALTH QUESTIONNAIRE - PHQ9
SUM OF ALL RESPONSES TO PHQ9 QUESTIONS 1 AND 2: 0
2. FEELING DOWN, DEPRESSED OR HOPELESS: NOT AT ALL
1. LITTLE INTEREST OR PLEASURE IN DOING THINGS: NOT AT ALL

## 2023-08-21 ASSESSMENT — LIFESTYLE VARIABLES
HOW OFTEN DO YOU HAVE A DRINK CONTAINING ALCOHOL: NEVER
HOW MANY STANDARD DRINKS CONTAINING ALCOHOL DO YOU HAVE ON A TYPICAL DAY: PATIENT DOES NOT DRINK

## 2023-08-21 NOTE — PROGRESS NOTES
Subjective   Wiliam Keenan is a 73 y.o. male who is here for a routine exam.  Active Problem List      Comprehensive Medical/Surgical/Social/Family History  Past Medical History:   Diagnosis Date    Left lower lobe pneumonia 08/14/2023    Other specified health status     No pertinent past medical history    Rupture of quadriceps tendon 08/14/2023    Sprain of left rotator cuff capsule 01/04/2016     Past Surgical History:   Procedure Laterality Date    OTHER SURGICAL HISTORY  05/17/2021    Hip surgery    OTHER SURGICAL HISTORY  05/17/2021    Back surgery    OTHER SURGICAL HISTORY  05/17/2021    Shoulder surgery     Social History     Social History Narrative    Not on file         Allergies and Medications  Bee venom protein (honey bee) and Poison ivy extract  No current outpatient medications on file prior to visit.     No current facility-administered medications on file prior to visit.       Lifestyle  Diet: Healthy diet, well-balanced  Exercise: Exercises regularly.  Currently limited due to patient's recent injury.  But recovering well.  Tobacco: None  Alcohol: None  Stress/Work: No significant stressors      Colorectal Screening:   colonoscopy  Date: 2021  Nocturia: None  Erectile dysfunction: None    Review of Systems   Constitutional:  Negative for appetite change, fatigue and fever.   HENT:  Negative for ear discharge, ear pain, hearing loss, postnasal drip, rhinorrhea and sinus pain.    Eyes:  Negative for visual disturbance.   Respiratory:  Negative for shortness of breath.    Cardiovascular:  Negative for chest pain, palpitations and leg swelling.   Gastrointestinal:  Negative for abdominal pain, blood in stool, constipation, diarrhea, nausea and vomiting.   Genitourinary:  Negative for flank pain and hematuria.   Musculoskeletal:  Negative for arthralgias and myalgias.   Skin:  Negative for rash.   Neurological:  Negative for dizziness and light-headedness.   All other systems reviewed and are  "negative.      Objective   /76 (BP Location: Right arm, Patient Position: Sitting)   Pulse 56   Temp 36.7 °C (98.1 °F) (Temporal)   Resp 16   Ht 1.88 m (6' 2\")   Wt 99.6 kg (219 lb 8 oz)   SpO2 94%   BMI 28.18 kg/m²     Physical Exam  Vitals reviewed.   Constitutional:       General: He is not in acute distress.     Appearance: Normal appearance. He is normal weight. He is not toxic-appearing.   HENT:      Head: Normocephalic and atraumatic.      Right Ear: Tympanic membrane and ear canal normal.      Left Ear: Tympanic membrane and ear canal normal.      Nose: Nose normal. No congestion or rhinorrhea.      Mouth/Throat:      Mouth: Mucous membranes are dry.   Eyes:      General: No scleral icterus.     Extraocular Movements: Extraocular movements intact.      Conjunctiva/sclera: Conjunctivae normal.      Pupils: Pupils are equal, round, and reactive to light.   Cardiovascular:      Rate and Rhythm: Normal rate and regular rhythm.      Heart sounds: No murmur heard.     No friction rub. No gallop.   Pulmonary:      Effort: Pulmonary effort is normal. No respiratory distress.      Breath sounds: Normal breath sounds. No wheezing, rhonchi or rales.   Abdominal:      General: Abdomen is flat. There is no distension.      Palpations: Abdomen is soft.      Tenderness: There is no abdominal tenderness. There is no guarding.   Musculoskeletal:         General: Normal range of motion.      Cervical back: Normal range of motion and neck supple.      Right lower leg: No edema.      Left lower leg: No edema.   Lymphadenopathy:      Cervical: No cervical adenopathy.   Skin:     General: Skin is warm and dry.   Neurological:      General: No focal deficit present.      Mental Status: He is alert and oriented to person, place, and time.   Psychiatric:         Mood and Affect: Mood normal.         Behavior: Behavior normal.         Assessment/Plan   Problem List Items Addressed This Visit       RESOLVED: Rupture of " quadriceps tendon     Other Visit Diagnoses       Routine general medical examination at health care facility    -  Primary          Reviewed Social Determinants of health with patient, discussed healthy lifestyle including 150 minutes of physical activity per week  Ordered/Reviewed baseline labwork -CBC, CMP, Lipid Panel  Immunizations Up-to-Date  Colonoscopy 2021  Congratulate patient on healthy lifestyle.  No follow-up necessary with lab work.  Continue follow-up with physical therapy.  Follow-up as new concerns arise.

## 2023-09-07 VITALS
RESPIRATION RATE: 14 BRPM | WEIGHT: 221.34 LBS | BODY MASS INDEX: 28.42 KG/M2 | TEMPERATURE: 97.5 F | HEART RATE: 53 BPM | SYSTOLIC BLOOD PRESSURE: 125 MMHG | OXYGEN SATURATION: 98 % | DIASTOLIC BLOOD PRESSURE: 73 MMHG

## 2023-09-07 VITALS
RESPIRATION RATE: 16 BRPM | HEART RATE: 52 BPM | SYSTOLIC BLOOD PRESSURE: 130 MMHG | OXYGEN SATURATION: 98 % | TEMPERATURE: 97.5 F | DIASTOLIC BLOOD PRESSURE: 72 MMHG | WEIGHT: 218.92 LBS | BODY MASS INDEX: 28.11 KG/M2

## 2023-09-11 ENCOUNTER — HOSPITAL ENCOUNTER (OUTPATIENT)
Dept: DATA CONVERSION | Facility: HOSPITAL | Age: 73
End: 2023-09-11
Attending: PSYCHIATRY & NEUROLOGY | Admitting: PSYCHIATRY & NEUROLOGY
Payer: MEDICARE

## 2023-09-11 DIAGNOSIS — Z00.6 ENCOUNTER FOR EXAMINATION FOR NORMAL COMPARISON AND CONTROL IN CLINICAL RESEARCH PROGRAM: ICD-10-CM

## 2023-09-29 VITALS — BODY MASS INDEX: 27.77 KG/M2 | WEIGHT: 216.27 LBS

## 2023-10-01 PROBLEM — R06.2 WHEEZING: Status: ACTIVE | Noted: 2023-10-01

## 2023-10-01 PROBLEM — J45.909 AIRWAY HYPERREACTIVITY (HHS-HCC): Status: ACTIVE | Noted: 2023-10-01

## 2023-10-01 PROBLEM — R05.8 PRODUCTIVE COUGH: Status: ACTIVE | Noted: 2023-10-01

## 2023-10-02 PROBLEM — S76.119D: Status: ACTIVE | Noted: 2023-10-02

## 2023-10-03 ENCOUNTER — OFFICE VISIT (OUTPATIENT)
Dept: ORTHOPEDIC SURGERY | Facility: CLINIC | Age: 73
End: 2023-10-03
Payer: MEDICARE

## 2023-10-03 ENCOUNTER — TREATMENT (OUTPATIENT)
Dept: PHYSICAL THERAPY | Facility: CLINIC | Age: 73
End: 2023-10-03
Payer: MEDICARE

## 2023-10-03 DIAGNOSIS — S76.119D QUADRICEPS TENDON RUPTURE, UNSPECIFIED LATERALITY, SUBSEQUENT ENCOUNTER: Primary | ICD-10-CM

## 2023-10-03 PROCEDURE — 1036F TOBACCO NON-USER: CPT | Performed by: ORTHOPAEDIC SURGERY

## 2023-10-03 PROCEDURE — 99024 POSTOP FOLLOW-UP VISIT: CPT | Performed by: ORTHOPAEDIC SURGERY

## 2023-10-03 PROCEDURE — 1126F AMNT PAIN NOTED NONE PRSNT: CPT | Performed by: ORTHOPAEDIC SURGERY

## 2023-10-03 PROCEDURE — 97110 THERAPEUTIC EXERCISES: CPT | Mod: GP

## 2023-10-03 PROCEDURE — 1160F RVW MEDS BY RX/DR IN RCRD: CPT | Performed by: ORTHOPAEDIC SURGERY

## 2023-10-03 PROCEDURE — 1159F MED LIST DOCD IN RCRD: CPT | Performed by: ORTHOPAEDIC SURGERY

## 2023-10-03 ASSESSMENT — PAIN SCALES - GENERAL: PAINLEVEL_OUTOF10: 0 - NO PAIN

## 2023-10-03 ASSESSMENT — PAIN - FUNCTIONAL ASSESSMENT
PAIN_FUNCTIONAL_ASSESSMENT: 0-10
PAIN_FUNCTIONAL_ASSESSMENT: 0-10

## 2023-10-03 ASSESSMENT — ENCOUNTER SYMPTOMS
DEPRESSION: 0
LOSS OF SENSATION IN FEET: 0
OCCASIONAL FEELINGS OF UNSTEADINESS: 1

## 2023-10-03 NOTE — PROGRESS NOTES
History of Present Illness  Chief Complaint   Patient presents with    Left Thigh - Follow-up       Patient returns today for evaluation Status Post left quadriceps tendon repair.  The patient notes improvement in pain.  His knee feels tight.  He is approxi-10 weeks postop.  The patient denies any significant numbness or tingling of calf pain.  He is going to therapy and is wearing the brace.     Exam  side: left Lower Extremity :  Incision healing nicely, no erythema or drainage  Range of motion: 0-105 degrees  Intact flexion and extension of digits  Neurovascular exam normal distally  2+ dorsalis pedis pulse and good cap refill     Radiographs  No images are attached to the encounter.     Assessment  Patient status post left quadriceps tendon repair     Plan  Immobilization: Continue the brace until his quadricep strength is 80% of the uninvolved side  Weight bearing: WBAT (Weight Bearing as Tolerated)  Reviewed rehab /return to activities  Continue physical therapy  Follow up in weeks 8  Questions answered

## 2023-10-03 NOTE — PROGRESS NOTES
"Physical Therapy    Physical Therapy Treatment    Patient Name: Wiliam Keenan  MRN: [id number]  Today's Date: 10/3/2023  Insurance reviewed   Visit number: 11   Approved number of visits: 16   Authorization not required after evaluation   Medicare  100% UCR  unlimited V/Y; KX p 20th   Onset Date: 07/ 17/ 2023     Time Calculation  Start Time: 0850  Stop Time: 0930  Time Calculation (min): 40 min      Assessment:  Pt had no complaints of increased pain during or after exercises. No skin abnormalities noted after modalities. Slight improvement in AROM for left knee flexion since last visit. Pt would benefit from PT to continue to address impairments in order to improve strength, flexibility, gait, and body mechanics and to decrease symptoms and increase overall function.  PT Assessment  PT Assessment Results: Decreased strength, Decreased range of motion    Plan:  Continue to progress left quad strengthening as regan  OP PT Plan  PT Plan: Skilled PT    Current Problem  1. Quadriceps tendon rupture, unspecified laterality, subsequent encounter            Subjective   General   Pt denies pain in left knee. He states that it has been feeling \"fantastic\". He states that he still has to use the arms of the chair to get up but does note an overall improvement in functional strength.   Precautions  Precautions:   per protocol in chart  hx of hip replacement, shoulder surgery, back surgery.    Date of surgery: 7/17/23  Quad tendon repair  Precautions  STEADI Fall Risk Score (The score of 4 or more indicates an increased risk of falling): 1       Pain  Pain Assessment: 0-10  0    Objective   Left knee flexion   AAROM 100  AROM 99        Treatments:    Therapeutic exercise (93021): timed minutes 40, units 3 . Obj measures and goals review  Rocking on bike SH 21.5 L0 x5'   QS x 7 min NMES  SLR x20 3 min NMES  heel slide with strap x15  Standing hip extension/abd x15 ea  Knee extension 0-90 2# 2x10  weight shifts with brace unlocked " "15x3\" NT  Mini squats FT/FA x10 ea   multi angle extension isometrics between 80-15 5\" hold ea NT  calf raises x15  fwd mini lunges x15   lat mini lunges on step x15   Standing HSC x15 NT.   Modalities: units 0 . NMES to L quad with quad set/SLR, PPR 1, 10 on/5 off, intensity to regan (time added to ther ex).               Goals:  Goals: Goals set and discussed today.   By discharge JERMAINE PICKENS will achieve the following goals:     1. Patient will report and demonstrate independence with current HEP ONGOING  2. Patient will demonstrate AROM of the L knee 0-115 to improve their ability to ambulate, negotiate stairs, and squat without restriction PROGRESSING   3. Patient will demonstrate the ability to ascend/descend one flight of stairs reciprocally and without an increase in pain to improve function within the home and the community PROGRESSING  4. Patient will demonstrate gross hip and knee strength >/= 4+/5 to improve the ability to ambulate, squat, and lift without restrictions PROGRESSING   5. Patient will demonstrate an increase in Lower Extremity Functional Scale score by >/= 37 points to 55/80 to meet established MCID (baseline 8/3/23/: 18/80) PROGRESSING  6. Patient will demonstrate the ability to ambulate >/= 150' outside of the brace, independently, and without major deviations in gait PROGRESSING   7. Patient will demonstrate the ability to perform 20 consecutive SLR without obvious quadriceps lag to indicate improving quadriceps activation MET  8. Patient will report pain of 0/10 at rest, and no greater than 2/10 with any activity including walking, stairs, squatting, and lunging PROGRESSING   9. Patient will demonstrate the ability to perform 15 forward and lateral lunges without pain in the left knee exceeding 2/10 PARTIALLY MET  Added 9/26/23:  10. Patient will demonstrate quadriceps and hamstring activation on the left >/= 80% of the contralateral side to improve his ability to ambulate, stand, " negotiate stairs, and squat     Planned interventions include: aquatic therapy, cryotherapy, dry needling, edema control, education/instruction, electrical stimulation, home program, hot pack, kinesiotaping, manual therapy, neuromuscular re-education, therapeutic activities, therapeutic exercises and vasopneumatic device w/ cold.   Frequency and duration: 1 time(s) a week, for 6 weeks, for 6 visits.     6 additional visits added to POC .   Progress with POC, as tolerated.

## 2023-10-03 NOTE — LETTER
October 4, 2023     Tonny Magallon DO  [id number]  [facility]  [facility] [address]    Patient: Wiliam Keenan   YOB: 1950   Date of Visit: 10/3/2023       Dear Dr. Tonny Magallon DO:    Thank you for referring Wiliam Keenan to me for evaluation. Below are my notes for this consultation.  If you have questions, please do not hesitate to call me. I look forward to following your patient along with you.       Sincerely,     Stone Brambila MD      CC: No Recipients  ______________________________________________________________________________________      History of Present Illness  Chief Complaint   Patient presents with   • Left Thigh - Follow-up       Patient returns today for evaluation Status Post left quadriceps tendon repair.  The patient notes improvement in pain.  His knee feels tight.  He is approxi-10 weeks postop.  The patient denies any significant numbness or tingling of calf pain.  He is going to therapy and is wearing the brace.     Exam  side: left Lower Extremity :  Incision healing nicely, no erythema or drainage  Range of motion: 0-105 degrees  Intact flexion and extension of digits  Neurovascular exam normal distally  2+ dorsalis pedis pulse and good cap refill     Radiographs  No images are attached to the encounter.     Assessment  Patient status post left quadriceps tendon repair     Plan  Immobilization: Continue the brace until his quadricep strength is 80% of the uninvolved side  Weight bearing: WBAT (Weight Bearing as Tolerated)  Reviewed rehab /return to activities  Continue physical therapy  Follow up in weeks 8  Questions answered

## 2023-10-04 DIAGNOSIS — Z00.6 EXAMINATION FOR NORMAL COMPARISON FOR CLINICAL RESEARCH: Primary | ICD-10-CM

## 2023-10-09 DIAGNOSIS — Z00.6 RESEARCH STUDY PATIENT: Primary | ICD-10-CM

## 2023-10-09 NOTE — PROGRESS NOTES
Physical Therapy    Physical Therapy Treatment    Patient Name: Wiliam Keenan  MRN: [id number]  Today's Date: 10/10/2023  Time Calculation  Start Time: 0715  Stop Time: 0758  Time Calculation (min): 43 min  Insurance reviewed   Visit number: 12   Approved number of visits: 16   Medicare  100% UCR  unlimited V/Y; KX p 20th   Onset Date: 07/ 17/ 2023       Assessment:  Treatment session focuses on therapeutic exercises targeting improving flexibility of the left  knee for knee flexion, and increasing overall strength and stability of the left lower extremity with emphasis on the quadriceps. Patient demonstrates slight regression in knee flexion ROM compared to previous session, but this is expected considering he is overcoming sickness and was unable to focus on his home exercises as much as previously. Quadriceps strength is progressing well at this time, but he remains below 80% value compared to contralateral side needed to discharge brace wear. He requires additional skilled PT focusing on the above mentioned impairments.  PT Assessment  PT Assessment Results: Decreased strength, Decreased range of motion, Decreased endurance, Impaired balance, Decreased mobility, Pain  Rehab Prognosis: Good    Plan:  OP PT Plan  PT Plan: Skilled PT  Rehab Potential: Good  Continue to progress knee flexion AROM, and strength/stability of the quadriceps  Current Problem  1. Quadriceps tendon rupture, unspecified laterality, subsequent encounter            Subjective   Pt reports that the knee has been feeling good. However, he has bene dealing with a sinus infection over the past week so he hasn't been able to work it much.   Precautions  Precautions  STEADI Fall Risk Score (The score of 4 or more indicates an increased risk of falling): 3    Pain  Pain Assessment: 0-10  Pain Score: 0 - No pain    Objective   Knee flexion AAROM: 98  Knee flexion AROM: 96    Treatments:  Rocking on bike SH 21.5 L0 x5'   QS x 7 min NMES NT  SLR x20 3  "min NMES NT  heel slide with strap x15  Standing hip extension/abd x15 ea NT  Knee extension 0-90 2# 3x10  HSC GTB 2x10  weight shifts with brace unlocked 15x3\" NT  Mini squats FT/FA x10 ea NT  multi angle extension isometrics between 80-15 5\" hold ea NT  calf raises x15 NT  fwd mini lunges x15 NT  lat mini lunges on step x15 NT  TKE BTB x20  Ball on wall squats 2x10  Heel taps 4\" x10  (43 min)    OP EDUCATION:     Education regarding activity progression for continued strengthening of the quadriceps  Goals:  By discharge:  1. Patient will report and demonstrate independence with current HEP ONGOING  2. Patient will demonstrate AROM of the L knee 0-115 to improve their ability to ambulate, negotiate stairs, and squat without restriction PROGRESSING   3. Patient will demonstrate the ability to ascend/descend one flight of stairs reciprocally and without an increase in pain to improve function within the home and the community PROGRESSING  4. Patient will demonstrate gross hip and knee strength >/= 4+/5 to improve the ability to ambulate, squat, and lift without restrictions PROGRESSING   5. Patient will demonstrate an increase in Lower Extremity Functional Scale score by >/= 37 points to 55/80 to meet established MCID (baseline 8/3/23/: 18/80) PROGRESSING  6. Patient will demonstrate the ability to ambulate >/= 150' outside of the brace, independently, and without major deviations in gait PROGRESSING   7. Patient will demonstrate the ability to perform 20 consecutive SLR without obvious quadriceps lag to indicate improving quadriceps activation MET  8. Patient will report pain of 0/10 at rest, and no greater than 2/10 with any activity including walking, stairs, squatting, and lunging PROGRESSING   9. Patient will demonstrate the ability to perform 15 forward and lateral lunges without pain in the left knee exceeding 2/10 PARTIALLY MET  Added 9/26/23:  10. Patient will demonstrate quadriceps and hamstring activation " on the left >/= 80% of the contralateral side to improve his ability to ambulate, stand, negotiate stairs, and squat

## 2023-10-10 ENCOUNTER — TREATMENT (OUTPATIENT)
Dept: PHYSICAL THERAPY | Facility: CLINIC | Age: 73
End: 2023-10-10
Payer: MEDICARE

## 2023-10-10 DIAGNOSIS — S76.119D QUADRICEPS TENDON RUPTURE, UNSPECIFIED LATERALITY, SUBSEQUENT ENCOUNTER: Primary | ICD-10-CM

## 2023-10-10 PROCEDURE — 97110 THERAPEUTIC EXERCISES: CPT | Mod: GP | Performed by: PHYSICAL THERAPIST

## 2023-10-10 ASSESSMENT — PAIN SCALES - GENERAL: PAINLEVEL_OUTOF10: 0 - NO PAIN

## 2023-10-10 ASSESSMENT — ENCOUNTER SYMPTOMS
LOSS OF SENSATION IN FEET: 0
DEPRESSION: 0
OCCASIONAL FEELINGS OF UNSTEADINESS: 0

## 2023-10-10 ASSESSMENT — PAIN - FUNCTIONAL ASSESSMENT: PAIN_FUNCTIONAL_ASSESSMENT: 0-10

## 2023-10-16 DIAGNOSIS — Z00.6 CLINICAL TRIAL PARTICIPANT: Primary | ICD-10-CM

## 2023-10-17 ENCOUNTER — HOSPITAL ENCOUNTER (INPATIENT)
Facility: HOSPITAL | Age: 73
LOS: 2 days | Discharge: HOME | DRG: 177 | End: 2023-10-19
Attending: EMERGENCY MEDICINE | Admitting: INTERNAL MEDICINE
Payer: MEDICARE

## 2023-10-17 ENCOUNTER — APPOINTMENT (OUTPATIENT)
Dept: RADIOLOGY | Facility: HOSPITAL | Age: 73
DRG: 177 | End: 2023-10-17
Payer: MEDICARE

## 2023-10-17 ENCOUNTER — APPOINTMENT (OUTPATIENT)
Dept: PHYSICAL THERAPY | Facility: CLINIC | Age: 73
End: 2023-10-17
Payer: MEDICARE

## 2023-10-17 DIAGNOSIS — J18.9 PNEUMONIA OF RIGHT UPPER LOBE DUE TO INFECTIOUS ORGANISM: Primary | ICD-10-CM

## 2023-10-17 DIAGNOSIS — R09.02 HYPOXIA: ICD-10-CM

## 2023-10-17 LAB
ALBUMIN SERPL BCP-MCNC: 3.6 G/DL (ref 3.4–5)
ALP SERPL-CCNC: 70 U/L (ref 33–136)
ALT SERPL W P-5'-P-CCNC: 11 U/L (ref 10–52)
ANION GAP SERPL CALC-SCNC: 12 MMOL/L (ref 10–20)
AST SERPL W P-5'-P-CCNC: 18 U/L (ref 9–39)
BASOPHILS # BLD AUTO: 0.03 X10*3/UL (ref 0–0.1)
BASOPHILS NFR BLD AUTO: 0.4 %
BILIRUB SERPL-MCNC: 0.8 MG/DL (ref 0–1.2)
BNP SERPL-MCNC: 58 PG/ML (ref 0–99)
BUN SERPL-MCNC: 14 MG/DL (ref 6–23)
CALCIUM SERPL-MCNC: 8.6 MG/DL (ref 8.6–10.3)
CARDIAC TROPONIN I PNL SERPL HS: 8 NG/L (ref 0–20)
CARDIAC TROPONIN I PNL SERPL HS: 8 NG/L (ref 0–20)
CHLORIDE SERPL-SCNC: 101 MMOL/L (ref 98–107)
CO2 SERPL-SCNC: 28 MMOL/L (ref 21–32)
CREAT SERPL-MCNC: 1.04 MG/DL (ref 0.5–1.3)
D DIMER PPP FEU-MCNC: 2785 NG/ML FEU
EOSINOPHIL # BLD AUTO: 0.04 X10*3/UL (ref 0–0.4)
EOSINOPHIL NFR BLD AUTO: 0.5 %
ERYTHROCYTE [DISTWIDTH] IN BLOOD BY AUTOMATED COUNT: 12.6 % (ref 11.5–14.5)
GFR SERPL CREATININE-BSD FRML MDRD: 75 ML/MIN/1.73M*2
GLUCOSE SERPL-MCNC: 98 MG/DL (ref 74–99)
HCT VFR BLD AUTO: 39.1 % (ref 41–52)
HGB BLD-MCNC: 13.4 G/DL (ref 13.5–17.5)
IMM GRANULOCYTES # BLD AUTO: 0.03 X10*3/UL (ref 0–0.5)
IMM GRANULOCYTES NFR BLD AUTO: 0.4 % (ref 0–0.9)
INR PPP: 1.2 (ref 0.9–1.1)
LACTATE SERPL-SCNC: 0.8 MMOL/L (ref 0.4–2)
LYMPHOCYTES # BLD AUTO: 1.13 X10*3/UL (ref 0.8–3)
LYMPHOCYTES NFR BLD AUTO: 14.3 %
MCH RBC QN AUTO: 30.4 PG (ref 26–34)
MCHC RBC AUTO-ENTMCNC: 34.3 G/DL (ref 32–36)
MCV RBC AUTO: 89 FL (ref 80–100)
MONOCYTES # BLD AUTO: 0.52 X10*3/UL (ref 0.05–0.8)
MONOCYTES NFR BLD AUTO: 6.6 %
NEUTROPHILS # BLD AUTO: 6.16 X10*3/UL (ref 1.6–5.5)
NEUTROPHILS NFR BLD AUTO: 77.8 %
NRBC BLD-RTO: 0 /100 WBCS (ref 0–0)
PLATELET # BLD AUTO: 207 X10*3/UL (ref 150–450)
PMV BLD AUTO: 8.4 FL (ref 7.5–11.5)
POTASSIUM SERPL-SCNC: 3.8 MMOL/L (ref 3.5–5.3)
PROT SERPL-MCNC: 6.1 G/DL (ref 6.4–8.2)
PROTHROMBIN TIME: 13.7 SECONDS (ref 9.8–12.8)
RBC # BLD AUTO: 4.41 X10*6/UL (ref 4.5–5.9)
SARS-COV-2 RNA RESP QL NAA+PROBE: NOT DETECTED
SODIUM SERPL-SCNC: 137 MMOL/L (ref 136–145)
WBC # BLD AUTO: 7.9 X10*3/UL (ref 4.4–11.3)

## 2023-10-17 PROCEDURE — 83605 ASSAY OF LACTIC ACID: CPT | Performed by: PHYSICIAN ASSISTANT

## 2023-10-17 PROCEDURE — 36415 COLL VENOUS BLD VENIPUNCTURE: CPT | Performed by: PHYSICIAN ASSISTANT

## 2023-10-17 PROCEDURE — 2550000001 HC RX 255 CONTRASTS: Performed by: PHYSICIAN ASSISTANT

## 2023-10-17 PROCEDURE — 85379 FIBRIN DEGRADATION QUANT: CPT | Performed by: PHYSICIAN ASSISTANT

## 2023-10-17 PROCEDURE — 87075 CULTR BACTERIA EXCEPT BLOOD: CPT | Mod: CMCLAB,ELYLAB | Performed by: PHYSICIAN ASSISTANT

## 2023-10-17 PROCEDURE — 71275 CT ANGIOGRAPHY CHEST: CPT | Mod: FOREIGN READ | Performed by: RADIOLOGY

## 2023-10-17 PROCEDURE — 71275 CT ANGIOGRAPHY CHEST: CPT | Mod: FR,MG

## 2023-10-17 PROCEDURE — 84484 ASSAY OF TROPONIN QUANT: CPT | Performed by: PHYSICIAN ASSISTANT

## 2023-10-17 PROCEDURE — 99285 EMERGENCY DEPT VISIT HI MDM: CPT | Mod: CS,25 | Performed by: EMERGENCY MEDICINE

## 2023-10-17 PROCEDURE — 85025 COMPLETE CBC W/AUTO DIFF WBC: CPT | Performed by: PHYSICIAN ASSISTANT

## 2023-10-17 PROCEDURE — 80053 COMPREHEN METABOLIC PANEL: CPT | Performed by: PHYSICIAN ASSISTANT

## 2023-10-17 PROCEDURE — 2500000002 HC RX 250 W HCPCS SELF ADMINISTERED DRUGS (ALT 637 FOR MEDICARE OP, ALT 636 FOR OP/ED): Performed by: PHYSICIAN ASSISTANT

## 2023-10-17 PROCEDURE — 99221 1ST HOSP IP/OBS SF/LOW 40: CPT | Performed by: NURSE PRACTITIONER

## 2023-10-17 PROCEDURE — 71045 X-RAY EXAM CHEST 1 VIEW: CPT | Mod: FR

## 2023-10-17 PROCEDURE — 87635 SARS-COV-2 COVID-19 AMP PRB: CPT | Performed by: PHYSICIAN ASSISTANT

## 2023-10-17 PROCEDURE — 83880 ASSAY OF NATRIURETIC PEPTIDE: CPT | Performed by: PHYSICIAN ASSISTANT

## 2023-10-17 PROCEDURE — 2500000004 HC RX 250 GENERAL PHARMACY W/ HCPCS (ALT 636 FOR OP/ED): Performed by: PHYSICIAN ASSISTANT

## 2023-10-17 PROCEDURE — 1210000001 HC SEMI-PRIVATE ROOM DAILY

## 2023-10-17 PROCEDURE — 85610 PROTHROMBIN TIME: CPT | Performed by: PHYSICIAN ASSISTANT

## 2023-10-17 PROCEDURE — 71045 X-RAY EXAM CHEST 1 VIEW: CPT | Mod: FOREIGN READ | Performed by: RADIOLOGY

## 2023-10-17 PROCEDURE — 94640 AIRWAY INHALATION TREATMENT: CPT

## 2023-10-17 RX ORDER — DOXYCYCLINE HYCLATE 100 MG
100 TABLET ORAL ONCE
Status: DISCONTINUED | OUTPATIENT
Start: 2023-10-17 | End: 2023-10-17

## 2023-10-17 RX ORDER — ALBUTEROL SULFATE 90 UG/1
2 AEROSOL, METERED RESPIRATORY (INHALATION) ONCE
Status: COMPLETED | OUTPATIENT
Start: 2023-10-17 | End: 2023-10-17

## 2023-10-17 RX ORDER — CEFTRIAXONE 1 G/50ML
1 INJECTION, SOLUTION INTRAVENOUS ONCE
Status: COMPLETED | OUTPATIENT
Start: 2023-10-17 | End: 2023-10-17

## 2023-10-17 RX ADMIN — ALBUTEROL SULFATE 2 PUFF: 90 AEROSOL, METERED RESPIRATORY (INHALATION) at 22:41

## 2023-10-17 RX ADMIN — CEFTRIAXONE SODIUM 1 G: 1 INJECTION, SOLUTION INTRAVENOUS at 22:50

## 2023-10-17 RX ADMIN — AZITHROMYCIN MONOHYDRATE 500 MG: 500 INJECTION, POWDER, LYOPHILIZED, FOR SOLUTION INTRAVENOUS at 23:08

## 2023-10-17 RX ADMIN — IOHEXOL 75 ML: 350 INJECTION, SOLUTION INTRAVENOUS at 19:36

## 2023-10-17 ASSESSMENT — LIFESTYLE VARIABLES
EVER FELT BAD OR GUILTY ABOUT YOUR DRINKING: NO
HAVE PEOPLE ANNOYED YOU BY CRITICIZING YOUR DRINKING: NO
REASON UNABLE TO ASSESS: NO
EVER HAD A DRINK FIRST THING IN THE MORNING TO STEADY YOUR NERVES TO GET RID OF A HANGOVER: NO
HAVE YOU EVER FELT YOU SHOULD CUT DOWN ON YOUR DRINKING: NO

## 2023-10-17 ASSESSMENT — COLUMBIA-SUICIDE SEVERITY RATING SCALE - C-SSRS
2. HAVE YOU ACTUALLY HAD ANY THOUGHTS OF KILLING YOURSELF?: NO
1. IN THE PAST MONTH, HAVE YOU WISHED YOU WERE DEAD OR WISHED YOU COULD GO TO SLEEP AND NOT WAKE UP?: NO
6. HAVE YOU EVER DONE ANYTHING, STARTED TO DO ANYTHING, OR PREPARED TO DO ANYTHING TO END YOUR LIFE?: NO

## 2023-10-17 ASSESSMENT — PAIN - FUNCTIONAL ASSESSMENT: PAIN_FUNCTIONAL_ASSESSMENT: 0-10

## 2023-10-17 ASSESSMENT — PAIN SCALES - GENERAL: PAINLEVEL_OUTOF10: 1

## 2023-10-17 NOTE — PROGRESS NOTES
The patient's care was turned over to me by the outgoing JD see note for details in summary this is a 73-year-old male with no past medical history presented with complaint of cough chills congestion for the past 3 weeks.  He went to an urgent care and was placed on Augmentin.  He took it for 3 days but states he was not getting any better.  Lab results were reviewed CBC white count 7.9 hemoglobin 13.4 hematocrit 39.1 platelet count was 207, metabolic panel shows a total protein of 6.1 otherwise within normal limits, GFR normal LFTs, his first troponin was an 8 COVID was negative BNP 58 lactic 0.8 PT 13.7 INR 1.2, D-dimer was elevated 2785.  Chest x-ray shows no acute cardiopulmonary process.  A CT scan of the chest is pending.    CT scan of the chest reads no definite CT evidence of acute PE, groundglass opacities in the right upper lobe and lung bases as described above which reads groundglass opacities located within the right upper lobe with a few nodular components which is most consistent with pneumonia, mild bibasilar bronchiectasis coronary artery calcifications.  I rounded on the patient and discussed results of work-up.  He remains on oxygen.  The patient states that he would prefer to go home.  I have ordered an albuterol MDI inhaler and will order blood cultures x2 and order Rocephin and azithromycin.  While talking with the patient on oxygen his pulse ox would drop as low as 84%.  The patient agrees to stay after discussing my concerns with pneumonia with hypoxia.  I paged out to the hospitalist for admission.    Spoke with Nataly MUNROE will admit the patient Dr. Benedict Freedman.  I have ordered blood cultures x2, Rocephin 1 g IV piggyback followed by Zithromax 500 mg IV piggyback.

## 2023-10-17 NOTE — ED PROVIDER NOTES
HPI   Chief Complaint   Patient presents with    Flu Symptoms     Hx of sinus infection seen at urgent care placed on antibiotics        A 73-year-old male patient with no past medical history comes in the emergency department today with severe cough, congestion and ongoing for the last 3 weeks.  States he initially went to an urgent care and they reluctantly gave him some Augmentin.  States he took this for about 3 days but things were improving.  Then went to urgent care again following week and they diagnosed him with just upper respiratory infection.  States his wife has a pulse oximeter at home that she uses.  He states he began to drop into the 80s and therefore he came to the emergency department today for further evaluation.  Describes his cough is productive with yellow sputum.  States the highest temperature he ever got was 100 °F but has been normal.  He states he does have body aches, chills.  Otherwise no other complaints this present time.  This purpose he comes in the emergency department today for further evaluation.                        No data recorded                Patient History   Past Medical History:   Diagnosis Date    Left lower lobe pneumonia 08/14/2023    Other specified health status     No pertinent past medical history    Rupture of quadriceps tendon 08/14/2023    Sprain of left rotator cuff capsule 01/04/2016     Past Surgical History:   Procedure Laterality Date    OTHER SURGICAL HISTORY  05/17/2021    Hip surgery    OTHER SURGICAL HISTORY  05/17/2021    Back surgery    OTHER SURGICAL HISTORY  05/17/2021    Shoulder surgery     No family history on file.  Social History     Tobacco Use    Smoking status: Never    Smokeless tobacco: Never   Vaping Use    Vaping Use: Never used   Substance Use Topics    Alcohol use: Not on file    Drug use: Never       Physical Exam   ED Triage Vitals [10/17/23 1446]   Temp Heart Rate Resp BP   36.7 °C (98.1 °F) 93 18 126/75      SpO2 Temp src Heart  Rate Source Patient Position   91 % -- Monitor Sitting      BP Location FiO2 (%)     Right arm --       Physical Exam  Constitutional:       Appearance: Normal appearance. He is ill-appearing.   HENT:      Head: Normocephalic and atraumatic.      Nose: Nose normal.   Eyes:      Extraocular Movements: Extraocular movements intact.      Conjunctiva/sclera: Conjunctivae normal.   Cardiovascular:      Rate and Rhythm: Normal rate and regular rhythm.      Heart sounds: Normal heart sounds.   Pulmonary:      Effort: Pulmonary effort is normal. No respiratory distress.      Breath sounds: No stridor. Rhonchi present. No wheezing.   Abdominal:      General: Abdomen is flat.   Musculoskeletal:         General: Normal range of motion.      Cervical back: Normal range of motion.   Skin:     General: Skin is warm and dry.   Neurological:      General: No focal deficit present.      Mental Status: He is alert and oriented to person, place, and time. Mental status is at baseline.   Psychiatric:         Mood and Affect: Mood normal.       ED Course & MDM   Diagnoses as of 11/30/23 0742   Pneumonia of right upper lobe due to infectious organism   Hypoxia       Medical Decision Making  A 73-year-old male patient with no past medical history comes in the emergency department today with severe cough, congestion and ongoing for the last 3 weeks.  States he initially went to an urgent care and they reluctantly gave him some Augmentin.  States he took this for about 3 days but things were improving.  Then went to urgent care again following week and they diagnosed him with just upper respiratory infection.  States his wife has a pulse oximeter at home that she uses.  He states he began to drop into the 80s and therefore he came to the emergency department today for further evaluation.  Describes his cough is productive with yellow sputum.  States the highest temperature he ever got was 100 °F but has been normal.  He states he does have  body aches, chills.  Otherwise no other complaints this present time.  This purpose he comes in the emergency department today for further evaluation.    EKG, chest x-ray, COVID-19, laboratory studies were ordered to rule out pneumonia, pulmonary congestion, ACS, arrhythmia, pneumothorax, significant leukocytosis.  Patient on O2 in the room.  D-dimer ordered to rule out pulmonary emboli secondary to patient's recent procedure.    Handoff to Wiliam Cruz PA-C pending laboratory studies, radiology studies, reevaluation disposition          Labs Reviewed   RESPIRATORY CULTURE/SMEAR - Abnormal       Result Value    Respiratory Culture/Smear   (*)     Value: Culture not performed. See Gram stain findings. Recollect if clinically indicated.    Gram Stain   (*)     Value: Gram stain indicates specimen contains significant salivary contamination.   CBC WITH AUTO DIFFERENTIAL - Abnormal    WBC 7.9      nRBC 0.0      RBC 4.41 (*)     Hemoglobin 13.4 (*)     Hematocrit 39.1 (*)     MCV 89      MCH 30.4      MCHC 34.3      RDW 12.6      Platelets 207      MPV 8.4      Neutrophils % 77.8      Immature Granulocytes %, Automated 0.4      Lymphocytes % 14.3      Monocytes % 6.6      Eosinophils % 0.5      Basophils % 0.4      Neutrophils Absolute 6.16 (*)     Immature Granulocytes Absolute, Automated 0.03      Lymphocytes Absolute 1.13      Monocytes Absolute 0.52      Eosinophils Absolute 0.04      Basophils Absolute 0.03     COMPREHENSIVE METABOLIC PANEL - Abnormal    Glucose 98      Sodium 137      Potassium 3.8      Chloride 101      Bicarbonate 28      Anion Gap 12      Urea Nitrogen 14      Creatinine 1.04      eGFR 75      Calcium 8.6      Albumin 3.6      Alkaline Phosphatase 70      Total Protein 6.1 (*)     AST 18      Bilirubin, Total 0.8      ALT 11     PROTIME-INR - Abnormal    Protime 13.7 (*)     INR 1.2 (*)    D-DIMER, VTE EXCLUSION - Abnormal    D-Dimer, Quantitative VTE Exclusion 2,785 (*)     Narrative:      The VTE Exclusion D-Dimer assay is reported in ng/mL Fibrinogen Equivalent Units (FEU).    Per 's instructions for use, a value of less than 500 ng/mL (FEU) may help to exclude DVT or PE in outpatients when the assay is used with a clinical pretest probability assessment.([facility] must utilize and document eCalc 'Wells Score Deep Vein Thrombosis Risk' for DVT exclusion only. Emergency Department should utilize  Guidelines for Emergency Department Use of the VTE Exclusion D-Dimer and Clinical Pretest probability assessment model for DVT or PE exclusion.)   BASIC METABOLIC PANEL - Abnormal    Glucose 130 (*)     Sodium 135 (*)     Potassium 3.8      Chloride 101      Bicarbonate 29      Anion Gap 9 (*)     Urea Nitrogen 13      Creatinine 1.08      eGFR 72      Calcium 8.2 (*)    CBC WITH AUTO DIFFERENTIAL - Abnormal    WBC 7.7      nRBC 0.0      RBC 4.15 (*)     Hemoglobin 12.5 (*)     Hematocrit 36.8 (*)     MCV 89      MCH 30.1      MCHC 34.0      RDW 12.4      Platelets 213      MPV 8.9      Neutrophils % 67.0      Immature Granulocytes %, Automated 0.6      Lymphocytes % 23.2      Monocytes % 8.2      Eosinophils % 0.5      Basophils % 0.5      Neutrophils Absolute 5.16      Immature Granulocytes Absolute, Automated 0.05      Lymphocytes Absolute 1.79      Monocytes Absolute 0.63      Eosinophils Absolute 0.04      Basophils Absolute 0.04     BLOOD CULTURE - Normal    Blood Culture No growth at 4 days -  FINAL REPORT     BLOOD CULTURE - Normal    Blood Culture No growth at 4 days -  FINAL REPORT     LEGIONELLA ANTIGEN, URINE - Normal    L. pneumophila Urine Ag Negative     STREPTOCOCCUS PNEUMONIAE ANTIGEN, URINE - Normal    Streptococcus pneumoniae Ag, Urine Negative     LACTATE - Normal    Lactate 0.8      Narrative:     Venipuncture immediately after or during the administration of Metamizole may lead to falsely low results. Testing should be performed immediately  prior to Metamizole dosing.   B-TYPE  NATRIURETIC PEPTIDE - Normal    BNP 58      Narrative:        <100 pg/mL - Heart failure unlikely  100-299 pg/mL - Intermediate probability of acute heart                  failure exacerbation. Correlate with clinical                  context and patient history.    >=300 pg/mL - Heart Failure likely. Correlate with clinical                  context and patient history.    BNP testing is performed using different testing methodology at [facility] than at [facility]. Direct result comparisons should only be made within the same method.      SARS-COV-2 PCR, SYMPTOMATIC - Normal    Coronavirus 2019, PCR Not Detected      Narrative:     This assay has received FDA Emergency Use Authorization (EUA) and is only authorized for the duration of time that circumstances exist to justify the authorization of the emergency use of in vitro diagnostic tests for the detection of SARS-CoV-2 virus and/or diagnosis of COVID-19 infection under section 564(b)(1) of the Act, 21 U.S.C. 360bbb-3(b)(1). This assay is an in vitro diagnostic nucleic acid amplification test for the qualitative detection of SARS-CoV-2 from nasopharyngeal specimens and has been validated for use at [facility] [place]. Negative results do not preclude COVID-19 infections and should not be used as the sole basis for diagnosis, treatment, or other management decisions.     SERIAL TROPONIN-INITIAL - Normal    Troponin I, High Sensitivity 8      Narrative:     Less than 99th percentile of normal range cutoff-  Female and children under 18 years old <14 ng/L; Male <21 ng/L: Negative  Repeat testing should be performed if clinically indicated.     Female and children under 18 years old 14-50 ng/L; Male 21-50 ng/L:  Consistent with possible cardiac damage and possible increased clinical   risk. Serial measurements may help to assess extent of myocardial damage.     >50 ng/L: Consistent with cardiac damage, increased clinical  risk and  myocardial infarction. Serial measurements may help assess extent of   myocardial damage.      NOTE: Children less than 1 year old may have higher baseline troponin   levels and results should be interpreted in conjunction with the overall   clinical context.     NOTE: Troponin I testing is performed using a different   testing methodology at [facility] than at other   [facility]. Direct result comparisons should only   be made within the same method.   SERIAL TROPONIN, 1 HOUR - Normal    Troponin I, High Sensitivity 8      Narrative:     Less than 99th percentile of normal range cutoff-  Female and children under 18 years old <14 ng/L; Male <21 ng/L: Negative  Repeat testing should be performed if clinically indicated.     Female and children under 18 years old 14-50 ng/L; Male 21-50 ng/L:  Consistent with possible cardiac damage and possible increased clinical   risk. Serial measurements may help to assess extent of myocardial damage.     >50 ng/L: Consistent with cardiac damage, increased clinical risk and  myocardial infarction. Serial measurements may help assess extent of   myocardial damage.      NOTE: Children less than 1 year old may have higher baseline troponin   levels and results should be interpreted in conjunction with the overall   clinical context.     NOTE: Troponin I testing is performed using a different   testing methodology at [facility] than at other   [facility]. Direct result comparisons should only   be made within the same method.   TROPONIN SERIES- (INITIAL, 1 HR)    Narrative:     The following orders were created for panel order Troponin I Series, High Sensitivity (0, 1 HR).  Procedure                               Abnormality         Status                     ---------                               -----------         ------                     Troponin I, High Sensiti...[[id number]]  Normal              Final result                Troponin, High Sensitivi...[[id number]]  Normal              Final result                 Please view results for these tests on the individual orders.        CT angio chest for pulmonary embolism   Final Result   1. No definite CT evidence of an acute pulmonary embolus.   2. Groundglass opacities in the right upper lobe and lung bases as   described above. These may represent foci of pneumonia   3. Mild bibasilar bronchiectasis.   4. Coronary artery calcifications.   Signed by Pasquale Jaffe MD      XR chest 1 view   Final Result   No acute process.   Signed by Aamir De La Fuente MD          Procedure  ECG 12 lead    Performed by: Elton Weaver PA-C  Authorized by: Elton Weaver PA-C    Interpretation:     Interpretation: normal      Details:  My EKG interpretation  Rate:     ECG rate:  70    ECG rate assessment: normal    Rhythm:     Rhythm: sinus rhythm    ST segments:     ST segments:  Normal       Elton Weaver PA-C  11/30/23 0742

## 2023-10-18 LAB
ANION GAP SERPL CALC-SCNC: 9 MMOL/L (ref 10–20)
BACTERIA SPEC RESP CULT: ABNORMAL
BASOPHILS # BLD AUTO: 0.04 X10*3/UL (ref 0–0.1)
BASOPHILS NFR BLD AUTO: 0.5 %
BUN SERPL-MCNC: 13 MG/DL (ref 6–23)
CALCIUM SERPL-MCNC: 8.2 MG/DL (ref 8.6–10.3)
CHLORIDE SERPL-SCNC: 101 MMOL/L (ref 98–107)
CO2 SERPL-SCNC: 29 MMOL/L (ref 21–32)
CREAT SERPL-MCNC: 1.08 MG/DL (ref 0.5–1.3)
EOSINOPHIL # BLD AUTO: 0.04 X10*3/UL (ref 0–0.4)
EOSINOPHIL NFR BLD AUTO: 0.5 %
ERYTHROCYTE [DISTWIDTH] IN BLOOD BY AUTOMATED COUNT: 12.4 % (ref 11.5–14.5)
GFR SERPL CREATININE-BSD FRML MDRD: 72 ML/MIN/1.73M*2
GLUCOSE SERPL-MCNC: 130 MG/DL (ref 74–99)
GRAM STN SPEC: ABNORMAL
HCT VFR BLD AUTO: 36.8 % (ref 41–52)
HGB BLD-MCNC: 12.5 G/DL (ref 13.5–17.5)
HOLD SPECIMEN: NORMAL
IMM GRANULOCYTES # BLD AUTO: 0.05 X10*3/UL (ref 0–0.5)
IMM GRANULOCYTES NFR BLD AUTO: 0.6 % (ref 0–0.9)
LYMPHOCYTES # BLD AUTO: 1.79 X10*3/UL (ref 0.8–3)
LYMPHOCYTES NFR BLD AUTO: 23.2 %
MCH RBC QN AUTO: 30.1 PG (ref 26–34)
MCHC RBC AUTO-ENTMCNC: 34 G/DL (ref 32–36)
MCV RBC AUTO: 89 FL (ref 80–100)
MONOCYTES # BLD AUTO: 0.63 X10*3/UL (ref 0.05–0.8)
MONOCYTES NFR BLD AUTO: 8.2 %
NEUTROPHILS # BLD AUTO: 5.16 X10*3/UL (ref 1.6–5.5)
NEUTROPHILS NFR BLD AUTO: 67 %
NRBC BLD-RTO: 0 /100 WBCS (ref 0–0)
PLATELET # BLD AUTO: 213 X10*3/UL (ref 150–450)
PMV BLD AUTO: 8.9 FL (ref 7.5–11.5)
POTASSIUM SERPL-SCNC: 3.8 MMOL/L (ref 3.5–5.3)
RBC # BLD AUTO: 4.15 X10*6/UL (ref 4.5–5.9)
SODIUM SERPL-SCNC: 135 MMOL/L (ref 136–145)
WBC # BLD AUTO: 7.7 X10*3/UL (ref 4.4–11.3)

## 2023-10-18 PROCEDURE — 80048 BASIC METABOLIC PNL TOTAL CA: CPT | Performed by: NURSE PRACTITIONER

## 2023-10-18 PROCEDURE — 2500000001 HC RX 250 WO HCPCS SELF ADMINISTERED DRUGS (ALT 637 FOR MEDICARE OP): Performed by: NURSE PRACTITIONER

## 2023-10-18 PROCEDURE — 87899 AGENT NOS ASSAY W/OPTIC: CPT | Mod: CMCLAB,ELYLAB | Performed by: NURSE PRACTITIONER

## 2023-10-18 PROCEDURE — 2500000002 HC RX 250 W HCPCS SELF ADMINISTERED DRUGS (ALT 637 FOR MEDICARE OP, ALT 636 FOR OP/ED): Performed by: NURSE PRACTITIONER

## 2023-10-18 PROCEDURE — 87205 SMEAR GRAM STAIN: CPT | Mod: CMCLAB,ELYLAB | Performed by: NURSE PRACTITIONER

## 2023-10-18 PROCEDURE — 99232 SBSQ HOSP IP/OBS MODERATE 35: CPT | Performed by: INTERNAL MEDICINE

## 2023-10-18 PROCEDURE — 94640 AIRWAY INHALATION TREATMENT: CPT

## 2023-10-18 PROCEDURE — 36415 COLL VENOUS BLD VENIPUNCTURE: CPT | Performed by: NURSE PRACTITIONER

## 2023-10-18 PROCEDURE — 2500000004 HC RX 250 GENERAL PHARMACY W/ HCPCS (ALT 636 FOR OP/ED): Performed by: NURSE PRACTITIONER

## 2023-10-18 PROCEDURE — 87449 NOS EACH ORGANISM AG IA: CPT | Mod: CMCLAB,ELYLAB | Performed by: NURSE PRACTITIONER

## 2023-10-18 PROCEDURE — 1100000001 HC PRIVATE ROOM DAILY

## 2023-10-18 PROCEDURE — 85025 COMPLETE CBC W/AUTO DIFF WBC: CPT | Performed by: NURSE PRACTITIONER

## 2023-10-18 RX ORDER — ONDANSETRON HYDROCHLORIDE 2 MG/ML
4 INJECTION, SOLUTION INTRAVENOUS EVERY 6 HOURS PRN
Status: DISCONTINUED | OUTPATIENT
Start: 2023-10-18 | End: 2023-10-19 | Stop reason: HOSPADM

## 2023-10-18 RX ORDER — GUAIFENESIN 600 MG/1
600 TABLET, EXTENDED RELEASE ORAL 2 TIMES DAILY PRN
Status: DISCONTINUED | OUTPATIENT
Start: 2023-10-18 | End: 2023-10-19 | Stop reason: HOSPADM

## 2023-10-18 RX ORDER — IBUPROFEN 400 MG/1
400 TABLET ORAL EVERY 8 HOURS PRN
Status: DISCONTINUED | OUTPATIENT
Start: 2023-10-18 | End: 2023-10-19 | Stop reason: HOSPADM

## 2023-10-18 RX ORDER — CEFTRIAXONE 2 G/50ML
2 INJECTION, SOLUTION INTRAVENOUS EVERY 24 HOURS
Status: DISCONTINUED | OUTPATIENT
Start: 2023-10-18 | End: 2023-10-18

## 2023-10-18 RX ORDER — ACETAMINOPHEN 325 MG/1
650 TABLET ORAL EVERY 6 HOURS PRN
Status: DISCONTINUED | OUTPATIENT
Start: 2023-10-18 | End: 2023-10-19 | Stop reason: HOSPADM

## 2023-10-18 RX ORDER — ALBUTEROL SULFATE 0.83 MG/ML
3 SOLUTION RESPIRATORY (INHALATION) EVERY 4 HOURS PRN
Status: DISCONTINUED | OUTPATIENT
Start: 2023-10-18 | End: 2023-10-19 | Stop reason: HOSPADM

## 2023-10-18 RX ORDER — IPRATROPIUM BROMIDE AND ALBUTEROL SULFATE 2.5; .5 MG/3ML; MG/3ML
3 SOLUTION RESPIRATORY (INHALATION)
Status: DISCONTINUED | OUTPATIENT
Start: 2023-10-18 | End: 2023-10-19 | Stop reason: HOSPADM

## 2023-10-18 RX ORDER — ACETAMINOPHEN 160 MG/5ML
650 SOLUTION ORAL EVERY 4 HOURS PRN
Status: DISCONTINUED | OUTPATIENT
Start: 2023-10-18 | End: 2023-10-18

## 2023-10-18 RX ORDER — ACETAMINOPHEN 325 MG/1
650 TABLET ORAL EVERY 4 HOURS PRN
Status: DISCONTINUED | OUTPATIENT
Start: 2023-10-18 | End: 2023-10-18

## 2023-10-18 RX ADMIN — IPRATROPIUM BROMIDE AND ALBUTEROL SULFATE 3 ML: 2.5; .5 SOLUTION RESPIRATORY (INHALATION) at 19:00

## 2023-10-18 RX ADMIN — IBUPROFEN 400 MG: 400 TABLET ORAL at 06:23

## 2023-10-18 RX ADMIN — AZITHROMYCIN MONOHYDRATE 500 MG: 500 INJECTION, POWDER, LYOPHILIZED, FOR SOLUTION INTRAVENOUS at 23:00

## 2023-10-18 RX ADMIN — CEFTRIAXONE 2 G: 2 INJECTION, POWDER, FOR SOLUTION INTRAMUSCULAR; INTRAVENOUS at 23:00

## 2023-10-18 RX ADMIN — IPRATROPIUM BROMIDE AND ALBUTEROL SULFATE 3 ML: 2.5; .5 SOLUTION RESPIRATORY (INHALATION) at 07:26

## 2023-10-18 RX ADMIN — IPRATROPIUM BROMIDE AND ALBUTEROL SULFATE 3 ML: 2.5; .5 SOLUTION RESPIRATORY (INHALATION) at 14:40

## 2023-10-18 SDOH — SOCIAL STABILITY: SOCIAL NETWORK: HOW OFTEN DO YOU ATTEND CHURCH OR RELIGIOUS SERVICES?: MORE THAN 4 TIMES PER YEAR

## 2023-10-18 SDOH — ECONOMIC STABILITY: FOOD INSECURITY: WITHIN THE PAST 12 MONTHS, THE FOOD YOU BOUGHT JUST DIDN'T LAST AND YOU DIDN'T HAVE MONEY TO GET MORE.: NEVER TRUE

## 2023-10-18 SDOH — HEALTH STABILITY: PHYSICAL HEALTH: ON AVERAGE, HOW MANY DAYS PER WEEK DO YOU ENGAGE IN MODERATE TO STRENUOUS EXERCISE (LIKE A BRISK WALK)?: 5 DAYS

## 2023-10-18 SDOH — HEALTH STABILITY: MENTAL HEALTH: HOW OFTEN DO YOU HAVE A DRINK CONTAINING ALCOHOL?: NEVER

## 2023-10-18 SDOH — SOCIAL STABILITY: SOCIAL NETWORK
IN A TYPICAL WEEK, HOW MANY TIMES DO YOU TALK ON THE PHONE WITH FAMILY, FRIENDS, OR NEIGHBORS?: MORE THAN THREE TIMES A WEEK

## 2023-10-18 SDOH — SOCIAL STABILITY: SOCIAL INSECURITY: WERE YOU ABLE TO COMPLETE ALL THE BEHAVIORAL HEALTH SCREENINGS?: YES

## 2023-10-18 SDOH — SOCIAL STABILITY: SOCIAL INSECURITY: WITHIN THE LAST YEAR, HAVE YOU BEEN AFRAID OF YOUR PARTNER OR EX-PARTNER?: NO

## 2023-10-18 SDOH — ECONOMIC STABILITY: INCOME INSECURITY: IN THE PAST 12 MONTHS, HAS THE ELECTRIC, GAS, OIL, OR WATER COMPANY THREATENED TO SHUT OFF SERVICE IN YOUR HOME?: NO

## 2023-10-18 SDOH — SOCIAL STABILITY: SOCIAL INSECURITY: HAVE YOU HAD THOUGHTS OF HARMING ANYONE ELSE?: NO

## 2023-10-18 SDOH — SOCIAL STABILITY: SOCIAL NETWORK: ARE YOU MARRIED, WIDOWED, DIVORCED, SEPARATED, NEVER MARRIED, OR LIVING WITH A PARTNER?: MARRIED

## 2023-10-18 SDOH — HEALTH STABILITY: PHYSICAL HEALTH: ON AVERAGE, HOW MANY MINUTES DO YOU ENGAGE IN EXERCISE AT THIS LEVEL?: 60 MIN

## 2023-10-18 SDOH — SOCIAL STABILITY: SOCIAL INSECURITY
WITHIN THE LAST YEAR, HAVE TO BEEN RAPED OR FORCED TO HAVE ANY KIND OF SEXUAL ACTIVITY BY YOUR PARTNER OR EX-PARTNER?: NO

## 2023-10-18 SDOH — SOCIAL STABILITY: SOCIAL INSECURITY
WITHIN THE LAST YEAR, HAVE YOU BEEN KICKED, HIT, SLAPPED, OR OTHERWISE PHYSICALLY HURT BY YOUR PARTNER OR EX-PARTNER?: NO

## 2023-10-18 SDOH — SOCIAL STABILITY: SOCIAL INSECURITY: ARE YOU OR HAVE YOU BEEN THREATENED OR ABUSED PHYSICALLY, EMOTIONALLY, OR SEXUALLY BY ANYONE?: NO

## 2023-10-18 SDOH — HEALTH STABILITY: MENTAL HEALTH
STRESS IS WHEN SOMEONE FEELS TENSE, NERVOUS, ANXIOUS, OR CAN'T SLEEP AT NIGHT BECAUSE THEIR MIND IS TROUBLED. HOW STRESSED ARE YOU?: NOT AT ALL

## 2023-10-18 SDOH — SOCIAL STABILITY: SOCIAL INSECURITY: HAS ANYONE EVER THREATENED TO HURT YOUR FAMILY OR YOUR PETS?: NO

## 2023-10-18 SDOH — HEALTH STABILITY: MENTAL HEALTH: HOW MANY STANDARD DRINKS CONTAINING ALCOHOL DO YOU HAVE ON A TYPICAL DAY?: PATIENT DOES NOT DRINK

## 2023-10-18 SDOH — HEALTH STABILITY: MENTAL HEALTH: HOW OFTEN DO YOU HAVE 6 OR MORE DRINKS ON ONE OCCASION?: NEVER

## 2023-10-18 SDOH — SOCIAL STABILITY: SOCIAL INSECURITY: WITHIN THE LAST YEAR, HAVE YOU BEEN HUMILIATED OR EMOTIONALLY ABUSED IN OTHER WAYS BY YOUR PARTNER OR EX-PARTNER?: NO

## 2023-10-18 SDOH — ECONOMIC STABILITY: FOOD INSECURITY: WITHIN THE PAST 12 MONTHS, YOU WORRIED THAT YOUR FOOD WOULD RUN OUT BEFORE YOU GOT MONEY TO BUY MORE.: NEVER TRUE

## 2023-10-18 SDOH — SOCIAL STABILITY: SOCIAL NETWORK: HOW OFTEN DO YOU GET TOGETHER WITH FRIENDS OR RELATIVES?: THREE TIMES A WEEK

## 2023-10-18 SDOH — ECONOMIC STABILITY: INCOME INSECURITY: IN THE LAST 12 MONTHS, WAS THERE A TIME WHEN YOU WERE NOT ABLE TO PAY THE MORTGAGE OR RENT ON TIME?: NO

## 2023-10-18 SDOH — ECONOMIC STABILITY: HOUSING INSECURITY: IN THE LAST 12 MONTHS, HOW MANY PLACES HAVE YOU LIVED?: 1

## 2023-10-18 SDOH — SOCIAL STABILITY: SOCIAL INSECURITY: DO YOU FEEL ANYONE HAS EXPLOITED OR TAKEN ADVANTAGE OF YOU FINANCIALLY OR OF YOUR PERSONAL PROPERTY?: NO

## 2023-10-18 SDOH — SOCIAL STABILITY: SOCIAL INSECURITY: DOES ANYONE TRY TO KEEP YOU FROM HAVING/CONTACTING OTHER FRIENDS OR DOING THINGS OUTSIDE YOUR HOME?: NO

## 2023-10-18 SDOH — HEALTH STABILITY: PHYSICAL HEALTH: ON AVERAGE, HOW MANY MINUTES DO YOU ENGAGE IN EXERCISE AT THIS LEVEL?: 30 MIN

## 2023-10-18 SDOH — ECONOMIC STABILITY: INCOME INSECURITY: HOW HARD IS IT FOR YOU TO PAY FOR THE VERY BASICS LIKE FOOD, HOUSING, MEDICAL CARE, AND HEATING?: NOT HARD AT ALL

## 2023-10-18 SDOH — SOCIAL STABILITY: SOCIAL NETWORK: HOW OFTEN DO YOU ATTENT MEETINGS OF THE CLUB OR ORGANIZATION YOU BELONG TO?: MORE THAN 4 TIMES PER YEAR

## 2023-10-18 SDOH — SOCIAL STABILITY: SOCIAL NETWORK
DO YOU BELONG TO ANY CLUBS OR ORGANIZATIONS SUCH AS CHURCH GROUPS UNIONS, FRATERNAL OR ATHLETIC GROUPS, OR SCHOOL GROUPS?: YES

## 2023-10-18 SDOH — SOCIAL STABILITY: SOCIAL INSECURITY: DO YOU FEEL UNSAFE GOING BACK TO THE PLACE WHERE YOU ARE LIVING?: NO

## 2023-10-18 SDOH — SOCIAL STABILITY: SOCIAL INSECURITY: ABUSE: ADULT

## 2023-10-18 SDOH — SOCIAL STABILITY: SOCIAL INSECURITY: ARE THERE ANY APPARENT SIGNS OF INJURIES/BEHAVIORS THAT COULD BE RELATED TO ABUSE/NEGLECT?: NO

## 2023-10-18 ASSESSMENT — COGNITIVE AND FUNCTIONAL STATUS - GENERAL
MOBILITY SCORE: 24
MOBILITY SCORE: 24
DAILY ACTIVITIY SCORE: 24
DAILY ACTIVITIY SCORE: 24
PATIENT BASELINE BEDBOUND: NO

## 2023-10-18 ASSESSMENT — ENCOUNTER SYMPTOMS
DIARRHEA: 0
LIGHT-HEADEDNESS: 0
MYALGIAS: 0
SHORTNESS OF BREATH: 1
DIZZINESS: 0
FATIGUE: 1
COUGH: 1
ARTHRALGIAS: 0
CONFUSION: 0
CHILLS: 1
PALPITATIONS: 0
WEAKNESS: 0
HEADACHES: 1
SINUS PRESSURE: 1
NECK STIFFNESS: 0
FEVER: 1
NAUSEA: 0

## 2023-10-18 ASSESSMENT — ACTIVITIES OF DAILY LIVING (ADL)
TOILETING: INDEPENDENT
BATHING: INDEPENDENT
LACK_OF_TRANSPORTATION: NO
GROOMING: INDEPENDENT
PATIENT'S MEMORY ADEQUATE TO SAFELY COMPLETE DAILY ACTIVITIES?: YES
HEARING - LEFT EAR: FUNCTIONAL
WALKS IN HOME: INDEPENDENT
HEARING - RIGHT EAR: FUNCTIONAL
LACK_OF_TRANSPORTATION: NO
ADEQUATE_TO_COMPLETE_ADL: YES
FEEDING YOURSELF: INDEPENDENT
JUDGMENT_ADEQUATE_SAFELY_COMPLETE_DAILY_ACTIVITIES: YES
DRESSING YOURSELF: INDEPENDENT

## 2023-10-18 ASSESSMENT — LIFESTYLE VARIABLES
PRESCIPTION_ABUSE_PAST_12_MONTHS: NO
SUBSTANCE_ABUSE_PAST_12_MONTHS: NO
AUDIT-C TOTAL SCORE: 0
SKIP TO QUESTIONS 9-10: 1
HOW OFTEN DO YOU HAVE A DRINK CONTAINING ALCOHOL: NEVER
HOW MANY STANDARD DRINKS CONTAINING ALCOHOL DO YOU HAVE ON A TYPICAL DAY: PATIENT DOES NOT DRINK
AUDIT-C TOTAL SCORE: 0
AUDIT-C TOTAL SCORE: 0
HOW OFTEN DO YOU HAVE 6 OR MORE DRINKS ON ONE OCCASION: NEVER
AUDIT-C TOTAL SCORE: 0
SKIP TO QUESTIONS 9-10: 1
SKIP TO QUESTIONS 9-10: 1

## 2023-10-18 ASSESSMENT — PAIN - FUNCTIONAL ASSESSMENT: PAIN_FUNCTIONAL_ASSESSMENT: 0-10

## 2023-10-18 ASSESSMENT — PATIENT HEALTH QUESTIONNAIRE - PHQ9
2. FEELING DOWN, DEPRESSED OR HOPELESS: NOT AT ALL
SUM OF ALL RESPONSES TO PHQ9 QUESTIONS 1 & 2: 0
1. LITTLE INTEREST OR PLEASURE IN DOING THINGS: NOT AT ALL

## 2023-10-18 ASSESSMENT — PAIN SCALES - GENERAL
PAINLEVEL_OUTOF10: 4
PAINLEVEL_OUTOF10: 0 - NO PAIN
PAINLEVEL_OUTOF10: 0 - NO PAIN

## 2023-10-18 NOTE — CARE PLAN
The patient's goals for the shift include decreased shortness of breath    The clinical goals for the shift include Titrate 02 to keep sp02 >95    Over the shift, the patient did not make progress toward the following goals. Barriers to progression include none. Recommendations to address these barriers include .

## 2023-10-18 NOTE — PROGRESS NOTES
Jamar Keenan is a 73 y.o. male on day 1 of admission presenting with Pneumonia of right upper lobe due to infectious organism.      Subjective   Patient seen evaluated this morning.  He states he feels much better since coming to the hospital.  He states he has been to urgent care multiple times and did not improve.  He is on 2 L of oxygen.  Denies any chest pain nausea vomiting abdominal pain at this time.       Objective     Last Recorded Vitals  BP 99/52   Pulse 70   Temp 36 °C (96.8 °F)   Resp 21   Wt 97.8 kg (215 lb 9.8 oz)   SpO2 90%   Intake/Output last 3 Shifts:    Intake/Output Summary (Last 24 hours) at 10/18/2023 1218  Last data filed at 10/18/2023 0008  Gross per 24 hour   Intake 300 ml   Output --   Net 300 ml       Admission Weight  Weight: 95.3 kg (210 lb) (10/17/23 1446)    Daily Weight  10/18/23 : 97.8 kg (215 lb 9.8 oz)    Image Results  CT angio chest for pulmonary embolism  Narrative: STUDY:  CT Angiogram of the Chest; 10/17/2023 7:37 PM   INDICATION:  Hypoxia.  COMPARISON:  XR chest 10/17/2023, 03/30/2019.  ACCESSION NUMBER(S):  [id number]  ORDERING CLINICIAN:  PANTERA MENDOZA  TECHNIQUE:  CTA of the chest was performed with intravenous contrast.   Images are reviewed and processed at a workstation according to the CT  angiogram protocol with 3-D and/or MIP post processing imaging  generated.  Omnipaque 350, 75 mL was administered intravenously.  Automated mA/kV exposure control was utilized and patient examination  was performed in strict accordance with principles of ALARA.  FINDINGS:  Pulmonary arteries are adequately opacified without acute or chronic  filling defects.  The thoracic aorta is normal in course and caliber  without dissection or aneurysm.  The heart is normal in size without pericardial effusion. Coronary  artery calcifications are visualized. Thoracic lymph nodes are not  enlarged.  There is no pleural effusion, pleural thickening, or pneumothorax.   The airways are  patent.  Lungs demonstrate mild bibasilar groundglass opacities likely  representing atelectasis or pneumonia. There is some mild bibasilar  bronchiectasis. There are groundglass opacities located within the  right upper lobe with a few nodular components which is most  consistent with pneumonia..  Upper abdomen demonstrates no acute pathology.  There are no acute fractures.  No suspicious bony lesions. There is  flowing ossification along the anterior aspect of the thoracic spine  which may represent DISH.  Impression: 1. No definite CT evidence of an acute pulmonary embolus.  2. Groundglass opacities in the right upper lobe and lung bases as  described above. These may represent foci of pneumonia  3. Mild bibasilar bronchiectasis.  4. Coronary artery calcifications.  Signed by Pasquale Jaffe MD  XR chest 1 view  Narrative: STUDY:  Chest Radiograph;  10/17/2023 4:38 PM.  INDICATION:  Cough and shortness of breath.  COMPARISON:  CT cardiac scoring 7/7/2022.  Chest radiographs 3/30/2019  ACCESSION NUMBER(S):  [id number]  ORDERING CLINICIAN:  PANTERA MENDOZA  TECHNIQUE:  Frontal chest was obtained at 16:30 hours.  FINDINGS:  CARDIOMEDIASTINAL SILHOUETTE:  Cardiomediastinal silhouette is normal in size and configuration.     LUNGS:  Lungs are clear.     ABDOMEN:  No remarkable upper abdominal findings.     BONES:  No acute osseous changes.  Impression: No acute process.  Signed by Aamir De La Fuente MD      Physical Exam  Constitutional:       Appearance: Normal appearance.   HENT:      Head: Normocephalic and atraumatic.      Nose: Nose normal.      Mouth/Throat:      Mouth: Mucous membranes are dry.   Eyes:      Extraocular Movements: Extraocular movements intact.      Conjunctiva/sclera: Conjunctivae normal.   Cardiovascular:      Rate and Rhythm: Normal rate and regular rhythm.      Pulses: Normal pulses.      Heart sounds: Normal heart sounds.   Pulmonary:      Effort: Pulmonary effort is normal.      Breath sounds:  Normal breath sounds.   Abdominal:      General: Bowel sounds are normal.      Palpations: Abdomen is soft.   Musculoskeletal:         General: Normal range of motion.      Cervical back: Normal range of motion and neck supple.   Skin:     General: Skin is warm and dry.   Neurological:      General: No focal deficit present.      Mental Status: He is alert and oriented to person, place, and time. Mental status is at baseline.   Psychiatric:         Mood and Affect: Mood normal.         Behavior: Behavior normal.         Thought Content: Thought content normal.         Judgment: Judgment normal.         Relevant Results               Assessment/Plan          73-year-old male presents with shortness of breath admitted with pneumonia.        Principal Problem:    Pneumonia of right upper lobe due to infectious organism    Pneumonia of right upper lobe  Acute respiratory failure with hypoxia  -CTA negative for PE.  Noted groundglass opacities right upper lobe pneumonia.  -Continue azithromycin and ceftriaxone  -Albuterol DuoNeb treatments as needed  -Continue with Mucinex.  -Blood and sputum cultures are pending  -Continue with supplemental oxygen.  Wean as able.      Anticipate discharge in 24 to 48 hours with clinical improvement.            Dilip Mendez MD

## 2023-10-18 NOTE — PROGRESS NOTES
10/18/23 1157   Discharge Planning   Living Arrangements Spouse/significant other   Support Systems Spouse/significant other   Assistance Needed Ind ADLS and IADLS no AD, drive, retired, no falls   Type of Residence Private residence  (1 floor with basement)   Number of Stairs to Enter Residence 6  (with handrail, and wheelchair ramp)   Number of Stairs Within Residence 12   Do you have animals or pets at home? Yes   Type of Animals or Pets 1 small dog   Home or Post Acute Services None   Patient expects to be discharged to: Home   Does the patient need discharge transport arranged? No   Financial Resource Strain   How hard is it for you to pay for the very basics like food, housing, medical care, and heating? Not hard   Housing Stability   In the last 12 months, was there a time when you were not able to pay the mortgage or rent on time? N   In the last 12 months, how many places have you lived? 1   In the last 12 months, was there a time when you did not have a steady place to sleep or slept in a shelter (including now)? N   Transportation Needs   In the past 12 months, has lack of transportation kept you from medical appointments or from getting medications? no   In the past 12 months, has lack of transportation kept you from meetings, work, or from getting things needed for daily living? No     Pt prefers home no needs

## 2023-10-18 NOTE — H&P
History Of Present Illness  Jamar Keenan is a 73 y.o. male presenting with shortness of breath.  Patient has been having cough, runny nose, and sinus congestion for the past 3 weeks.  He was seen by urgent care twice most recently about a week ago and was reluctantly prescribed Augmentin.  Over the last several days he has noticed no improvement in his symptoms and in fact is now having a productive cough with green sputum, malaise and fevers.  He reports taking approximately 3 days of the Augmentin.  Due to increasing dyspnea patient was brought in for evaluation.  He denies having any dizziness, chest pain, palpitations, PND, orthopnea, nausea, vomiting or diarrhea.  Denies any urinary symptoms.  Denies any lower extremity swelling.    Upon presentation emergency department afebrile 36.7, HR 93 sinus rhythm, RR 18, /75, 91% on room air.  Patient was placed on 2 L nasal cannula improved to 98%. Sodium 137, potassium 3.8, chloride 101, bicarb 28, anion gap 12, BUN 14, creatinine 1.04 GFR 75.  LFTs within normal limits.  Lactate 0.8.  BNP 58.  Troponin negative x2.  D-dimer 2785.  WBCs normal.  Neutrophil count 6.16.  CT angio chest negative for pulmonary embolism.  Groundglass opacities in the right upper lobe and lung bases.  Mild bibasilar bronchiectasis.  COVID-19 PCR negative.  Twelve-lead EKG shows sinus bradycardia with a ventricular rate of 58.  No acute ischemic changes.  Patient treated with azithromycin, ceftriaxone, albuterol and MDI admitted to internal medicine for further evaluation and treatment.    Past Medical History  Past Medical History:   Diagnosis Date    Left lower lobe pneumonia 08/14/2023    Other specified health status     No pertinent past medical history    Rupture of quadriceps tendon 08/14/2023    Sprain of left rotator cuff capsule 01/04/2016     Surgical History  He has a past surgical history that includes Other surgical history (05/17/2021); Other surgical history  (05/17/2021); and Other surgical history (05/17/2021).     Social History  He reports that he has never smoked. He has never used smokeless tobacco. He reports that he does not use drugs. No history on file for alcohol use.    Family History  No family history on file.     Allergies  Bee venom protein (honey bee) and Poison ivy extract    Medications    Current Facility-Administered Medications:     azithromycin (Zithromax) in dextrose 5 % in water (D5W) 250 mL  mg, 500 mg, intravenous, Once, Wiliam Cruz PA-C, Last Rate: 250 mL/hr at 10/17/23 2308, 500 mg at 10/17/23 2308    Current Outpatient Medications:     oxyCODONE (Oxaydo) 5 mg immediate release tablet, Take 1 tablet (5 mg) by mouth every 6 hours if needed for severe pain (7 - 10)., Disp: , Rfl:     Review of Systems   Constitutional:  Positive for chills, fatigue and fever.   HENT:  Positive for congestion, postnasal drip and sinus pressure.    Respiratory:  Positive for cough and shortness of breath.    Cardiovascular:  Negative for chest pain, palpitations and leg swelling.   Gastrointestinal:  Negative for diarrhea and nausea.   Musculoskeletal:  Negative for arthralgias, myalgias and neck stiffness.   Neurological:  Positive for headaches. Negative for dizziness, weakness and light-headedness.   Psychiatric/Behavioral:  Negative for confusion.    All other systems reviewed and are negative.       Physical Exam  Constitutional:       General: He is not in acute distress.     Appearance: Normal appearance. He is not toxic-appearing.   HENT:      Head: Normocephalic and atraumatic.      Nose: Nose normal.      Mouth/Throat:      Mouth: Mucous membranes are dry.      Pharynx: Oropharynx is clear.   Eyes:      Extraocular Movements: Extraocular movements intact.      Pupils: Pupils are equal, round, and reactive to light.   Cardiovascular:      Rate and Rhythm: Normal rate and regular rhythm.      Pulses: Normal pulses.      Heart sounds: Normal  heart sounds.   Pulmonary:      Effort: Pulmonary effort is normal. No respiratory distress.      Breath sounds: Normal breath sounds. No wheezing, rhonchi or rales.   Abdominal:      General: Abdomen is flat. Bowel sounds are normal.      Palpations: Abdomen is soft.   Musculoskeletal:         General: Normal range of motion.      Cervical back: Normal range of motion and neck supple.   Skin:     General: Skin is warm and dry.      Capillary Refill: Capillary refill takes less than 2 seconds.   Neurological:      General: No focal deficit present.      Mental Status: He is alert and oriented to person, place, and time. Mental status is at baseline.   Psychiatric:         Attention and Perception: Attention and perception normal.         Mood and Affect: Mood normal.         Speech: Speech normal.         Behavior: Behavior normal. Behavior is cooperative.         Cognition and Memory: Cognition and memory normal.          Last Recorded Vitals  /72 (BP Location: Right arm, Patient Position: Sitting)   Pulse 67   Temp 36.7 °C (98.1 °F)   Resp 18   Wt 95.3 kg (210 lb)   SpO2 96%     Scheduled medications  azithromycin, 500 mg, intravenous, q24h  cefTRIAXone, 2 g, intravenous, q24h  ipratropium-albuteroL, 3 mL, nebulization, q6h      Continuous medications     PRN medications  PRN medications: acetaminophen **OR** acetaminophen, acetaminophen, albuterol, ondansetron, oxygen    Results for orders placed or performed during the hospital encounter of 10/17/23 (from the past 24 hour(s))   CBC and Auto Differential   Result Value Ref Range    WBC 7.9 4.4 - 11.3 x10*3/uL    nRBC 0.0 0.0 - 0.0 /100 WBCs    RBC 4.41 (L) 4.50 - 5.90 x10*6/uL    Hemoglobin 13.4 (L) 13.5 - 17.5 g/dL    Hematocrit 39.1 (L) 41.0 - 52.0 %    MCV 89 80 - 100 fL    MCH 30.4 26.0 - 34.0 pg    MCHC 34.3 32.0 - 36.0 g/dL    RDW 12.6 11.5 - 14.5 %    Platelets 207 150 - 450 x10*3/uL    MPV 8.4 7.5 - 11.5 fL    Neutrophils % 77.8 40.0 - 80.0 %     Immature Granulocytes %, Automated 0.4 0.0 - 0.9 %    Lymphocytes % 14.3 13.0 - 44.0 %    Monocytes % 6.6 2.0 - 10.0 %    Eosinophils % 0.5 0.0 - 6.0 %    Basophils % 0.4 0.0 - 2.0 %    Neutrophils Absolute 6.16 (H) 1.60 - 5.50 x10*3/uL    Immature Granulocytes Absolute, Automated 0.03 0.00 - 0.50 x10*3/uL    Lymphocytes Absolute 1.13 0.80 - 3.00 x10*3/uL    Monocytes Absolute 0.52 0.05 - 0.80 x10*3/uL    Eosinophils Absolute 0.04 0.00 - 0.40 x10*3/uL    Basophils Absolute 0.03 0.00 - 0.10 x10*3/uL   Comprehensive Metabolic Panel   Result Value Ref Range    Glucose 98 74 - 99 mg/dL    Sodium 137 136 - 145 mmol/L    Potassium 3.8 3.5 - 5.3 mmol/L    Chloride 101 98 - 107 mmol/L    Bicarbonate 28 21 - 32 mmol/L    Anion Gap 12 10 - 20 mmol/L    Urea Nitrogen 14 6 - 23 mg/dL    Creatinine 1.04 0.50 - 1.30 mg/dL    eGFR 75 >60 mL/min/1.73m*2    Calcium 8.6 8.6 - 10.3 mg/dL    Albumin 3.6 3.4 - 5.0 g/dL    Alkaline Phosphatase 70 33 - 136 U/L    Total Protein 6.1 (L) 6.4 - 8.2 g/dL    AST 18 9 - 39 U/L    Bilirubin, Total 0.8 0.0 - 1.2 mg/dL    ALT 11 10 - 52 U/L   Lactate   Result Value Ref Range    Lactate 0.8 0.4 - 2.0 mmol/L   Protime-INR   Result Value Ref Range    Protime 13.7 (H) 9.8 - 12.8 seconds    INR 1.2 (H) 0.9 - 1.1   B-Type Natriuretic Peptide   Result Value Ref Range    BNP 58 0 - 99 pg/mL   D-Dimer, Quantitative VTE Exclusion   Result Value Ref Range    D-Dimer, Quantitative VTE Exclusion 2,785 (H) <=500 ng/mL FEU   Sars-CoV-2 RT PCR, Symptomatic   Result Value Ref Range    Coronavirus 2019, PCR Not Detected Not Detected   Troponin I, High Sensitivity, Initial   Result Value Ref Range    Troponin I, High Sensitivity 8 0 - 20 ng/L   Blood Culture    Specimen: Peripheral Venipuncture; Blood culture   Result Value Ref Range    Blood Culture Loaded on Instrument - Culture in progress    Blood Culture    Specimen: Peripheral Venipuncture; Blood culture   Result Value Ref Range    Blood Culture Loaded on  Instrument - Culture in progress    Troponin, High Sensitivity, 1 Hour   Result Value Ref Range    Troponin I, High Sensitivity 8 0 - 20 ng/L     CT angio chest for pulmonary embolism    Result Date: 10/17/2023  STUDY: CT Angiogram of the Chest; 10/17/2023 7:37 PM INDICATION: Hypoxia. COMPARISON: XR chest 10/17/2023, 03/30/2019. ACCESSION NUMBER(S): [id number] ORDERING CLINICIAN: PANTERA MENDOZA   1. No definite CT evidence of an acute pulmonary embolus. 2. Groundglass opacities in the right upper lobe and lung bases as described above. These may represent foci of pneumonia 3. Mild bibasilar bronchiectasis. 4. Coronary artery calcifications. Signed by Pasquale Jaffe MD    XR chest 1 view    Result Date: 10/17/2023  STUDY: Chest Radiograph;  10/17/2023 4:38 PM. INDICATION: Cough and shortness of breath. COMPARISON: CT cardiac scoring 7/7/2022.  Chest radiographs 3/30/2019 ACCESSION NUMBER(S): [id number] ORDERING CLINICIAN: PANTERA MENDOZA TECHNIQUE:  Frontal chest was obtained at 16:30 hours. FINDINGS: CARDIOMEDIASTINAL SILHOUETTE: Cardiomediastinal silhouette is normal in size and configuration.  LUNGS: Lungs are clear.  ABDOMEN: No remarkable upper abdominal findings.  BONES: No acute osseous changes.    No acute process. Signed by aAmir De La Fuente MD        Assessment/Plan   Principal Problem:    Pneumonia of right upper lobe due to infectious organism    #Pneumonia, presumed gram negative  #Hypoxia  CT angio chest reviewed. Neg for PE. + Groundglass opacities in right upper lobe and lung bases.  IV Ceftriaxone and Azithromycin   Albuterol and Duoneb treatments q 2 PRN and 6 hours   Mucinex BID  Follow up blood cultures   Strep and Legionella antigen  Sputum culture   Supplemental oxygen to keep SPO2> 94%      Nataly Brown, APRN-CNP

## 2023-10-19 VITALS
BODY MASS INDEX: 27.67 KG/M2 | HEIGHT: 74 IN | RESPIRATION RATE: 17 BRPM | SYSTOLIC BLOOD PRESSURE: 125 MMHG | WEIGHT: 215.61 LBS | OXYGEN SATURATION: 95 % | HEART RATE: 92 BPM | TEMPERATURE: 98.2 F | DIASTOLIC BLOOD PRESSURE: 62 MMHG

## 2023-10-19 LAB
LEGIONELLA AG UR QL: NEGATIVE
S PNEUM AG UR QL: NEGATIVE

## 2023-10-19 PROCEDURE — 99239 HOSP IP/OBS DSCHRG MGMT >30: CPT | Performed by: INTERNAL MEDICINE

## 2023-10-19 PROCEDURE — 94640 AIRWAY INHALATION TREATMENT: CPT

## 2023-10-19 PROCEDURE — 2500000002 HC RX 250 W HCPCS SELF ADMINISTERED DRUGS (ALT 637 FOR MEDICARE OP, ALT 636 FOR OP/ED): Performed by: NURSE PRACTITIONER

## 2023-10-19 RX ORDER — DOXYCYCLINE 100 MG/1
100 CAPSULE ORAL 2 TIMES DAILY
Qty: 10 CAPSULE | Refills: 0 | Status: SHIPPED | OUTPATIENT
Start: 2023-10-19 | End: 2023-10-24 | Stop reason: ALTCHOICE

## 2023-10-19 RX ADMIN — IPRATROPIUM BROMIDE AND ALBUTEROL SULFATE 3 ML: 2.5; .5 SOLUTION RESPIRATORY (INHALATION) at 07:43

## 2023-10-19 RX ADMIN — Medication: at 07:45

## 2023-10-19 RX ADMIN — IPRATROPIUM BROMIDE AND ALBUTEROL SULFATE 3 ML: 2.5; .5 SOLUTION RESPIRATORY (INHALATION) at 02:17

## 2023-10-19 ASSESSMENT — COGNITIVE AND FUNCTIONAL STATUS - GENERAL
MOBILITY SCORE: 24
DAILY ACTIVITIY SCORE: 24

## 2023-10-19 ASSESSMENT — PAIN SCALES - GENERAL: PAINLEVEL_OUTOF10: 0 - NO PAIN

## 2023-10-19 NOTE — NURSING NOTE
Patient verbalized understanding of signs and symptoms to watch for and when to call MD. Patient has belongings.

## 2023-10-19 NOTE — CARE PLAN
The patient's goals for the shift include decreased shortness of breath    The clinical goals for the shift include will be able to breath easier, no SOB.    Over the shift, the patient did not make progress toward the following goals. Barriers to progression include . Recommendations to address these barriers include .

## 2023-10-19 NOTE — DISCHARGE SUMMARY
Discharge Diagnosis  Pneumonia of right upper lobe due to infectious organism    Issues Requiring Follow-Up  With PCP.  Recommend outpatient PFTs once acute pneumonia has resolved.    Discharge Meds     Your medication list        START taking these medications        Instructions Last Dose Given Next Dose Due   doxycycline 100 mg capsule  Commonly known as: Vibramycin      Take 1 capsule (100 mg) by mouth 2 times a day for 5 days. Take with at least 8 ounces (large glass) of water, do not lie down for 30 minutes after              CONTINUE taking these medications        Instructions Last Dose Given Next Dose Due   oxyCODONE 5 mg immediate release tablet  Commonly known as: Oxaydo                  STOP taking these medications      Study AHEAD [id number] or placebo injection                  Where to Get Your Medications        These medications were sent to [facility] [address] [place] [address] [id number] [facility]  [address] [place] [address]      Phone: [phone]   doxycycline 100 mg capsule         Test Results Pending At Discharge  Pending Labs       Order Current Status    Blood Culture Preliminary result    Blood Culture Preliminary result            Hospital Course    73 y.o. male presenting with shortness of breath.  Patient has been having cough, runny nose, and sinus congestion for the past 3 weeks.   Patient also failed outpatient Augmentin treatment.  The patient had CT angio chest negative for pulmonary embolism.  Groundglass opacities in the right upper lobe and lung bases.  Mild bibasilar bronchiectasis.  COVID-19 PCR negative .  The patient was started on azithromycin, ceftriaxone with significant improvement in symptoms.  The patient also had breathing treatments during the admission.  Blood and sputum cultures were sent with sputum cultures were contaminated.  Blood cultures show no growth to date.  Legionella and strep urinary antigens have been negative.  Initially the patient  required supplemental oxygen since then the patient has improved and now on room air.  Patient is overall doing well is requesting discharge and is otherwise stable for discharge with outpatient follow-up.  Pertinent Physical Exam At Time of Discharge  Physical Exam  Constitutional:       Appearance: Normal appearance.   HENT:      Head: Normocephalic and atraumatic.      Nose: Nose normal.      Mouth/Throat:      Mouth: Mucous membranes are dry.   Eyes:      Extraocular Movements: Extraocular movements intact.      Conjunctiva/sclera: Conjunctivae normal.   Cardiovascular:      Rate and Rhythm: Normal rate and regular rhythm.      Pulses: Normal pulses.      Heart sounds: Normal heart sounds.   Pulmonary:      Effort: Pulmonary effort is normal.      Breath sounds: Normal breath sounds.   Abdominal:      General: Bowel sounds are normal.      Palpations: Abdomen is soft.   Musculoskeletal:         General: Normal range of motion.      Cervical back: Normal range of motion and neck supple.   Skin:     General: Skin is warm and dry.   Neurological:      General: No focal deficit present.      Mental Status: He is alert and oriented to person, place, and time. Mental status is at baseline.   Psychiatric:         Mood and Affect: Mood normal.         Behavior: Behavior normal.         Thought Content: Thought content normal.         Judgment: Judgment normal.         Outpatient Follow-Up  Future Appointments   Date Time Provider Department Center   10/31/2023  8:45 AM [facility] Kim Downing   11/6/2023  8:00 AM [facility] ROOM 1 [facility] L[facility] 6508-1 [id number] [facility]   11/6/2023 11:00 AM [facility] Rad [facility]   11/7/2023  8:45 AM [facility] Kim Downing   11/14/2023  8:45 AM [facility] Kim Downing   12/4/2023  9:00 AM [facility] ROOM 1 [facility] [id number] [id number] [facility]   12/5/2023  8:30 AM Stone Brambila MD [id number] Mikey Mendez MD

## 2023-10-20 ENCOUNTER — DOCUMENTATION (OUTPATIENT)
Dept: PRIMARY CARE | Facility: CLINIC | Age: 73
End: 2023-10-20
Payer: MEDICARE

## 2023-10-20 ENCOUNTER — PATIENT OUTREACH (OUTPATIENT)
Dept: PRIMARY CARE | Facility: CLINIC | Age: 73
End: 2023-10-20
Payer: MEDICARE

## 2023-10-20 NOTE — PROGRESS NOTES
Discharge Facility: [facility]  Discharge Diagnosis: Pneumonia of right upper lobe due to infectious organism   Admission Date: 10/17/2023  Discharge Date: 10/19/2023    PCP Appointment Date: 10/24/2023 14:40  Specialist Appointment Date: 11/6/2023 08:00 Research Visit [facility]  Hospital Encounter and Summary: Linked     2 attempts were made to reach patient to assess needs. No contact made. See brief summary below:   Wrap Up  Wrap Up Additional Comments: 73M presented to ED with c/o shortness of breath. Patient stated he had a cough, runny nose, and sinus congestion X3 weeks. Dx: Pneumonia of right upper lobe due to infectious organism. Covid PCR negative. Patient treated with azithromycin, ceftriaxone, albuterol and MDI admitted to internal medicine for further evaluation and treatment. Patient discharged to home self care with Rx for doxycycline. Patient has been attending outpatient PT for a quadriceps rupture and has a scheduled appt on 10/31/2023. (10/20/2023  2:58 PM)

## 2023-10-21 LAB
BACTERIA BLD CULT: NORMAL
BACTERIA BLD CULT: NORMAL

## 2023-10-21 NOTE — DOCUMENTATION CLARIFICATION NOTE
"    PATIENT:               JERMAINE PICKENS  ACCT #:                  [id number]  MRN:                       [id number]  :                       1950  ADMIT DATE:       10/17/2023 2:51 PM  DISCH DATE:        10/19/2023 1:00 PM  RESPONDING PROVIDER #:        [id number]          PROVIDER RESPONSE TEXT:    Gram Negative PNA    CDI QUERY TEXT:    [facility]_Pneumonia Specificity        Instruction:    Based on your assessment of the patient and the clinical information, please provide the requested documentation by clicking on the appropriate radio button and enter any additional information if prompted.    Question: Please further specify the type of pneumonia being treated    When answering this query, please exercise your independent professional judgment. The fact that a question is being asked, does not imply that any particular answer is desired or expected.    The patient's clinical indicators include:  Clinical Information: 73 yom with pneumonia, failed out patient treatment with Augmentin    Clinical Indicators:    10/19 DC summary by Dilip- \"Discharge Diagnosis-Pneumonia of right upper lobe due to infectious organism. Groundglass opacities in the right upper lobe and lung bases. The patient was started on azithromycin, ceftriaxone with significant improvement in symptoms.\"    H&P on 10/18 by Kevin POOL-\"Pneumonia, presumed gram negative\"    Treatment: Azithromycin, Rocephin, oxygen, discharged on Doxycycline with recommendation for outpatient PFT's following resolution of pneumonia    Risk Factors: 73 yom with hx of pneumonia  Options provided:  -- Gram Negative PNA  -- Other - I will add my own diagnosis  -- Refer to Clinical Documentation Reviewer    Query created by: Aye Grullon on 10/20/2023 8:27 AM      Electronically signed by:  MARTHA STEVENS MD 10/21/2023 3:08 AM          "

## 2023-10-24 ENCOUNTER — APPOINTMENT (OUTPATIENT)
Dept: PHYSICAL THERAPY | Facility: CLINIC | Age: 73
End: 2023-10-24
Payer: MEDICARE

## 2023-10-24 ENCOUNTER — OFFICE VISIT (OUTPATIENT)
Dept: PRIMARY CARE | Facility: CLINIC | Age: 73
End: 2023-10-24
Payer: MEDICARE

## 2023-10-24 VITALS
BODY MASS INDEX: 27.21 KG/M2 | TEMPERATURE: 98.7 F | RESPIRATION RATE: 20 BRPM | WEIGHT: 212 LBS | HEIGHT: 74 IN | DIASTOLIC BLOOD PRESSURE: 80 MMHG | OXYGEN SATURATION: 96 % | SYSTOLIC BLOOD PRESSURE: 129 MMHG

## 2023-10-24 DIAGNOSIS — Z09 HOSPITAL DISCHARGE FOLLOW-UP: Primary | ICD-10-CM

## 2023-10-24 DIAGNOSIS — R06.2 WHEEZING: ICD-10-CM

## 2023-10-24 PROBLEM — R05.8 PRODUCTIVE COUGH: Status: RESOLVED | Noted: 2023-10-01 | Resolved: 2023-10-24

## 2023-10-24 PROCEDURE — 1111F DSCHRG MED/CURRENT MED MERGE: CPT

## 2023-10-24 PROCEDURE — 1126F AMNT PAIN NOTED NONE PRSNT: CPT

## 2023-10-24 PROCEDURE — 99496 TRANSJ CARE MGMT HIGH F2F 7D: CPT

## 2023-10-24 PROCEDURE — 1160F RVW MEDS BY RX/DR IN RCRD: CPT

## 2023-10-24 PROCEDURE — 1159F MED LIST DOCD IN RCRD: CPT

## 2023-10-24 PROCEDURE — 1036F TOBACCO NON-USER: CPT

## 2023-10-24 RX ORDER — ALBUTEROL SULFATE 90 UG/1
2 AEROSOL, METERED RESPIRATORY (INHALATION) EVERY 4 HOURS PRN
Qty: 8.5 G | Refills: 0 | Status: SHIPPED | OUTPATIENT
Start: 2023-10-24 | End: 2024-01-18 | Stop reason: ALTCHOICE

## 2023-10-24 ASSESSMENT — ENCOUNTER SYMPTOMS
PALPITATIONS: 0
APPETITE CHANGE: 1
NAUSEA: 1
RHINORRHEA: 0
FEVER: 0
WHEEZING: 0
WEAKNESS: 0
SINUS PRESSURE: 0
DIARRHEA: 0
SHORTNESS OF BREATH: 0
CONSTIPATION: 0
VOMITING: 0
BACK PAIN: 0
FATIGUE: 0
LIGHT-HEADEDNESS: 0
HEADACHES: 0
NECK PAIN: 0
SINUS PAIN: 0
COUGH: 1
CHEST TIGHTNESS: 0
APNEA: 0
ACTIVITY CHANGE: 0
SORE THROAT: 0
ABDOMINAL PAIN: 0
DIZZINESS: 0

## 2023-10-24 NOTE — PROGRESS NOTES
I reviewed the resident/fellow's documentation and discussed the patient with the resident/fellow. I agree with the resident/fellow's medical decision making as documented in the note.     Shadia Isabel, DO

## 2023-10-24 NOTE — PROGRESS NOTES
Subjective   Patient ID: Jamar Keenan is a 73 y.o. male who presents for Follow-up ([facility] follow for pneumonia. He is still having SOB).  He is presenting to follow up from a hospital visit at [facility].  While he was hospitalized he received ceftriaxone and azithromycin and was on room air on day of discharge.  Received doxycycline for 5 days and he completed his course.  He has not had any significant dyspnea since leaving the hospital and he has been able to complete basic yard work and walk into the office from the parking lot without any shortness of breath.  His cough has gradually improved first moving to dry cough from a productive cough and now rare cough.  No headaches, dizziness, chest pain, nausea, vomiting.  Non smoker, no inhaler use. Finished doxycycline.  He does endorse that he has had a minimum very significant improvement since he was admitted to the hospital with a gradual improvement every day, stating that he feels better since even yesterday despite having some nausea yesterday which he attributes to the doxycycline.  Other questions or concerns at this time          Review of Systems   Constitutional:  Positive for appetite change. Negative for activity change, fatigue and fever.   HENT:  Negative for congestion, rhinorrhea, sinus pressure, sinus pain and sore throat.    Respiratory:  Positive for cough. Negative for apnea, chest tightness, shortness of breath and wheezing.    Cardiovascular:  Negative for chest pain and palpitations.   Gastrointestinal:  Positive for nausea. Negative for abdominal pain, constipation, diarrhea and vomiting.   Musculoskeletal:  Negative for back pain and neck pain.   Neurological:  Negative for dizziness, weakness, light-headedness and headaches.       Objective   Physical Exam  Vitals reviewed.   Constitutional:       General: He is not in acute distress.     Appearance: Normal appearance. He is not ill-appearing or toxic-appearing.   HENT:       Mouth/Throat:      Mouth: Mucous membranes are moist.      Pharynx: Oropharynx is clear.   Eyes:      General: No scleral icterus.     Conjunctiva/sclera: Conjunctivae normal.   Cardiovascular:      Rate and Rhythm: Normal rate and regular rhythm.      Pulses: Normal pulses.      Heart sounds: Normal heart sounds.   Pulmonary:      Effort: Pulmonary effort is normal. No respiratory distress.      Breath sounds: No stridor. Wheezing present. No rhonchi or rales.      Comments: Very faint, barely audible wheeze heard parasternally on the left side  Chest:      Chest wall: No tenderness.   Musculoskeletal:      Right lower leg: No edema.      Left lower leg: No edema.   Skin:     General: Skin is warm and dry.      Capillary Refill: Capillary refill takes less than 2 seconds.      Coloration: Skin is not pale.   Neurological:      General: No focal deficit present.      Mental Status: He is alert and oriented to person, place, and time. Mental status is at baseline.      Gait: Gait normal.   Psychiatric:         Mood and Affect: Mood normal.         Behavior: Behavior normal.         Assessment/Plan   Problem List Items Addressed This Visit             ICD-10-CM    Wheezing R06.2    Relevant Medications    albuterol (ProAir HFA) 90 mcg/actuation inhaler     Other Visit Diagnoses         Codes    Hospital discharge follow-up    -  Primary Z09    Relevant Medications    albuterol (ProAir HFA) 90 mcg/actuation inhaler        Patient is presenting as a hospital follow-up from recent admission at [facility] for pneumonia.  He completed an IV antibiotic course and then was sent on doxycycline.  He he endorses significant improvement.  On exam he is very well-appearing with good air entry into his lungs and no rhonchi or rails.  He has a barely audible wheeze heard on inspiration.  His initial pulse ox on presenting today was 90%, though he was asymptomatic.  Continue to take pulse ox is throughout his visit  and with deep breaths his pulse ox improved to 96% and stayed there for the remainder of the visit.  Will plan for patient to go home and use incentive spirometer, which she has available at home, Every hour as we discussed in clinic.  I will also write for an albuterol inhaler for him to use as needed with any shortness of breath or wheezing over the next few days.  He is very well-appearing but given his oxygen saturation today, I would like him to follow-up on Friday with Dr. Sousa or one of the residents here in clinic to ensure that he continues to recover well from his pneumonia.  Discussed warning signs and reasons to return to emergency department or call the office and patient was agreeable with plan.

## 2023-10-27 ENCOUNTER — LAB (OUTPATIENT)
Dept: LAB | Facility: LAB | Age: 73
End: 2023-10-27
Payer: MEDICARE

## 2023-10-27 ENCOUNTER — OFFICE VISIT (OUTPATIENT)
Dept: PRIMARY CARE | Facility: CLINIC | Age: 73
End: 2023-10-27
Payer: MEDICARE

## 2023-10-27 VITALS
WEIGHT: 207 LBS | OXYGEN SATURATION: 98 % | HEART RATE: 80 BPM | BODY MASS INDEX: 26.58 KG/M2 | SYSTOLIC BLOOD PRESSURE: 124 MMHG | RESPIRATION RATE: 20 BRPM | TEMPERATURE: 98.2 F | DIASTOLIC BLOOD PRESSURE: 77 MMHG

## 2023-10-27 DIAGNOSIS — J18.9 PNEUMONIA OF RIGHT UPPER LOBE DUE TO INFECTIOUS ORGANISM: ICD-10-CM

## 2023-10-27 DIAGNOSIS — J18.9 PNEUMONIA OF RIGHT UPPER LOBE DUE TO INFECTIOUS ORGANISM: Primary | ICD-10-CM

## 2023-10-27 PROCEDURE — 36415 COLL VENOUS BLD VENIPUNCTURE: CPT

## 2023-10-27 PROCEDURE — 84145 PROCALCITONIN (PCT): CPT

## 2023-10-27 PROCEDURE — 99213 OFFICE O/P EST LOW 20 MIN: CPT

## 2023-10-27 PROCEDURE — 1160F RVW MEDS BY RX/DR IN RCRD: CPT

## 2023-10-27 PROCEDURE — 1036F TOBACCO NON-USER: CPT

## 2023-10-27 PROCEDURE — 1126F AMNT PAIN NOTED NONE PRSNT: CPT

## 2023-10-27 PROCEDURE — 1111F DSCHRG MED/CURRENT MED MERGE: CPT

## 2023-10-27 PROCEDURE — 1159F MED LIST DOCD IN RCRD: CPT

## 2023-10-27 RX ORDER — AZITHROMYCIN 250 MG/1
TABLET, FILM COATED ORAL
Qty: 6 TABLET | Refills: 0 | Status: SHIPPED | OUTPATIENT
Start: 2023-10-27 | End: 2023-11-01

## 2023-10-27 RX ORDER — AMOXICILLIN AND CLAVULANATE POTASSIUM 875; 125 MG/1; MG/1
875 TABLET, FILM COATED ORAL 2 TIMES DAILY
Qty: 10 TABLET | Refills: 0 | Status: SHIPPED | OUTPATIENT
Start: 2023-10-27 | End: 2023-11-01

## 2023-10-27 RX ORDER — GUAIFENESIN 600 MG/1
1200 TABLET, EXTENDED RELEASE ORAL 2 TIMES DAILY
Qty: 120 TABLET | Refills: 0 | Status: SHIPPED | OUTPATIENT
Start: 2023-10-27 | End: 2023-11-26

## 2023-10-27 ASSESSMENT — ENCOUNTER SYMPTOMS
FEVER: 0
NAUSEA: 0
LIGHT-HEADEDNESS: 0
COUGH: 1
DIZZINESS: 0
STRIDOR: 0
VOMITING: 0
SHORTNESS OF BREATH: 1
WHEEZING: 0

## 2023-10-27 NOTE — ASSESSMENT & PLAN NOTE
Pt presenting to clinic for follow up on O2 saturation. Pt pulse ox in room today ranging from 86-98%. Pt claims he is doing well and does not feel like he has many symptoms other than a lingering cough and some mild dyspnea on exertion. Pt comfortable in room today, no noticed conversational dyspnea, PE reassuring. Ambulatory pulse ox in clinic revealed 88-90% however given pts well appearance clinically feeling okay to send pt home with self monitoring of pulse ox. Pt also given augmentin BID and zpak for further coverage of possible lingering PNA. Pt to continue incentive spirometry and albuterol prn. Pt instructed on symptoms/signs that would warrant an ER visit. Procalcitonin ordered for evaluation of abx usage in setting of possible PNA.  Mucinex given for assistance with mucous clearing. Pt to follow up on Monday for further evaluation of symptom progression.

## 2023-10-27 NOTE — PROGRESS NOTES
Subjective   Jamar Keenan is a 73 y.o. male who presents for Follow-up (Follow up low 02).  Pt is presenting for follow up on PNA. Pt saturation in room as high as 98% and as low as 86%. Pt notices that he does get some relief with albuterol. He has been using the incentive spirometer 6-8 times a day, multiple times per session. He has not been taking any other medications. He is recovering from a Knee surgery so isn't able to be super active but has noticed that when he has to walk a little faster he gets some labored breathing. Pt appears in comfortable in room and claims he is doing well mostly. Pt is a non smoker.        Review of Systems   Constitutional:  Negative for fever.   Respiratory:  Positive for cough and shortness of breath. Negative for wheezing and stridor.    Cardiovascular:  Negative for chest pain.   Gastrointestinal:  Negative for nausea and vomiting.   Neurological:  Negative for dizziness and light-headedness.   All other systems reviewed and are negative.      Objective   Physical Exam  Vitals reviewed.   Constitutional:       General: He is not in acute distress.     Appearance: Normal appearance. He is not toxic-appearing.   HENT:      Head: Normocephalic and atraumatic.      Nose: Nose normal.   Eyes:      Extraocular Movements: Extraocular movements intact.   Cardiovascular:      Rate and Rhythm: Normal rate and regular rhythm.      Heart sounds: No murmur heard.     No friction rub. No gallop.   Pulmonary:      Effort: Pulmonary effort is normal. No respiratory distress.      Breath sounds: Rhonchi present. No rales.      Comments: Very mild rhonchial sounds heard at RUL  Skin:     General: Skin is warm and dry.   Neurological:      General: No focal deficit present.      Mental Status: He is alert.   Psychiatric:         Mood and Affect: Mood normal.         Behavior: Behavior normal.         Assessment/Plan   Problem List Items Addressed This Visit             ICD-10-CM    Pneumonia of  right upper lobe due to infectious organism - Primary J18.9     Pt presenting to clinic for follow up on O2 saturation. Pt pulse ox in room today ranging from 86-98%. Pt claims he is doing well and does not feel like he has many symptoms other than a lingering cough and some mild dyspnea on exertion. Pt comfortable in room today, no noticed conversational dyspnea, PE reassuring. Ambulatory pulse ox in clinic revealed 88-90% however given pts well appearance clinically feeling okay to send pt home with self monitoring of pulse ox. Pt also given augmentin BID and zpak for further coverage of possible lingering PNA. Pt to continue incentive spirometry and albuterol prn. Pt instructed on symptoms/signs that would warrant an ER visit. Procalcitonin ordered for evaluation of abx usage in setting of possible PNA.  Mucinex given for assistance with mucous clearing. Pt to follow up on Monday for further evaluation of symptom progression.         Relevant Medications    amoxicillin-pot clavulanate (Augmentin) 875-125 mg tablet    guaiFENesin (Mucinex) 600 mg 12 hr tablet    azithromycin (Zithromax) 250 mg tablet    Other Relevant Orders    Check Pulse Oximetry while ambulating    Procalcitonin    Follow Up In [facility] - PCP - Established     CT angio chest for pulmonary embolism  Status: Final result     PACS Images     Show images for CT angio chest for pulmonary embolism  Signed by    Signed Time Phone Pager   Pasquale Jaffe MD 10/17/2023 20:01 [phone]      Exam Information    Status Exam Begun Exam Ended   Final 10/17/2023 19:23 10/17/2023 19:37     Study Result    Narrative & Impression   STUDY:  CT Angiogram of the Chest; 10/17/2023 7:37 PM   INDICATION:  Hypoxia.  COMPARISON:  XR chest 10/17/2023, 03/30/2019.  ACCESSION NUMBER(S):  [id number]  ORDERING CLINICIAN:  PANTERA MENDOZA  TECHNIQUE:  CTA of the chest was performed with intravenous contrast.   Images are reviewed and processed at a workstation  according to the CT  angiogram protocol with 3-D and/or MIP post processing imaging  generated.  Omnipaque 350, 75 mL was administered intravenously.  Automated mA/kV exposure control was utilized and patient examination  was performed in strict accordance with principles of ALARA.  FINDINGS:  Pulmonary arteries are adequately opacified without acute or chronic  filling defects.  The thoracic aorta is normal in course and caliber  without dissection or aneurysm.  The heart is normal in size without pericardial effusion. Coronary  artery calcifications are visualized. Thoracic lymph nodes are not  enlarged.  There is no pleural effusion, pleural thickening, or pneumothorax.   The airways are patent.  Lungs demonstrate mild bibasilar groundglass opacities likely  representing atelectasis or pneumonia. There is some mild bibasilar  bronchiectasis. There are groundglass opacities located within the  right upper lobe with a few nodular components which is most  consistent with pneumonia..  Upper abdomen demonstrates no acute pathology.  There are no acute fractures.  No suspicious bony lesions. There is  flowing ossification along the anterior aspect of the thoracic spine  which may represent DISH.  IMPRESSION:  1. No definite CT evidence of an acute pulmonary embolus.  2. Groundglass opacities in the right upper lobe and lung bases as  described above. These may represent foci of pneumonia  3. Mild bibasilar bronchiectasis.  4. Coronary artery calcifications.  Signed by Pasquale Jaffe MD     Result History    CT angio chest for pulmonary embolism (Order #[id number]) on 10/17/2023 - Order Result History Report    Breast Imaging Recommendations  Jamar Keenan  No recommendations exist for this order.  Signed by    Signed Time Phone Pager   Pasquale Jaffe MD 10/17/2023 20:01 [phone]      Exam Information    Status Exam Begun Exam Ended   Final 10/17/2023 19:23 10/17/2023 19:37     External Results Report    Open External  Results Report    Encounter    View Encounter             Screening Form Questions    No questions have been answered for this form.     CT angio chest for pulmonary embolism  Order: [id number]  Status: Final result         Visible to patient: Yes (seen)         Next appt: 10/30/2023 at 11:00 AM in [facility] (Molly Barahona DO)      0 Result Notes    Details    Reading Physician Reading Date Result Priority   Pasquale Jaffe MD  [phone] 10/17/2023      Narrative & Impression  STUDY:  CT Angiogram of the Chest; 10/17/2023 7:37 PM   INDICATION:  Hypoxia.  COMPARISON:  XR chest 10/17/2023, 03/30/2019.  ACCESSION NUMBER(S):  [id number]  ORDERING CLINICIAN:  PANTERA MENDOZA  TECHNIQUE:  CTA of the chest was performed with intravenous contrast.   Images are reviewed and processed at a workstation according to the CT  angiogram protocol with 3-D and/or MIP post processing imaging  generated.  Omnipaque 350, 75 mL was administered intravenously.  Automated mA/kV exposure control was utilized and patient examination  was performed in strict accordance with principles of ALARA.  FINDINGS:  Pulmonary arteries are adequately opacified without acute or chronic  filling defects.  The thoracic aorta is normal in course and caliber  without dissection or aneurysm.  The heart is normal in size without pericardial effusion. Coronary  artery calcifications are visualized. Thoracic lymph nodes are not  enlarged.  There is no pleural effusion, pleural thickening, or pneumothorax.   The airways are patent.  Lungs demonstrate mild bibasilar groundglass opacities likely  representing atelectasis or pneumonia. There is some mild bibasilar  bronchiectasis. There are groundglass opacities located within the  right upper lobe with a few nodular components which is most  consistent with pneumonia..  Upper abdomen demonstrates no acute pathology.  There are no acute fractures.  No suspicious bony lesions. There is  flowing ossification  along the anterior aspect of the thoracic spine  which may represent DISH.  IMPRESSION:  1. No definite CT evidence of an acute pulmonary embolus.  2. Groundglass opacities in the right upper lobe and lung bases as  described above. These may represent foci of pneumonia  3. Mild bibasilar bronchiectasis.  4. Coronary artery calcifications.  Signed by Pasquale Jaffe MD     I was present   during the history and physical note performed by resident  and have reviewed the findings and discussed the case with him and the following comments:  We discussed and reviewed the key portions of the history of present illness and physical exam with the resident, whose name is noted at the bottom of the chart.    I agree with the assessment and plan as documented  by the resident and and add these additional comments:   E toa changes on exam ..  See antibiotic selection  .. Close follow up        Discussed medication side effects.  The  risk benefits and treatment options  discussed with patient.       Please schedule follow-up appointment based upon your improvement/failure to improve/chronic medical conditions and physician recommendations during office appointment at the .       Patient advised to go to er if symptoms worsen or to call answering service, or to return to office for additional evaluation    This note was partially  generated using Dragon voice recognition and there may be incorrect words, wording, spelling, or pronunciation errors that were not corrected prior to committing the note to the medical record.

## 2023-10-28 LAB — PROCALCITONIN SERPL-MCNC: 0.02 NG/ML

## 2023-10-30 ENCOUNTER — OFFICE VISIT (OUTPATIENT)
Dept: PRIMARY CARE | Facility: CLINIC | Age: 73
End: 2023-10-30
Payer: MEDICARE

## 2023-10-30 VITALS
TEMPERATURE: 97.7 F | DIASTOLIC BLOOD PRESSURE: 82 MMHG | RESPIRATION RATE: 18 BRPM | WEIGHT: 210 LBS | HEART RATE: 84 BPM | SYSTOLIC BLOOD PRESSURE: 124 MMHG | OXYGEN SATURATION: 96 % | BODY MASS INDEX: 26.96 KG/M2

## 2023-10-30 DIAGNOSIS — T78.40XA CURRENT HYPERSENSITIVITY REACTION, INITIAL ENCOUNTER: ICD-10-CM

## 2023-10-30 DIAGNOSIS — Z00.6 CLINICAL TRIAL PARTICIPANT: Primary | ICD-10-CM

## 2023-10-30 DIAGNOSIS — J18.9 PNEUMONIA OF RIGHT UPPER LOBE DUE TO INFECTIOUS ORGANISM: Primary | ICD-10-CM

## 2023-10-30 PROCEDURE — 1159F MED LIST DOCD IN RCRD: CPT

## 2023-10-30 PROCEDURE — 1160F RVW MEDS BY RX/DR IN RCRD: CPT

## 2023-10-30 PROCEDURE — 1036F TOBACCO NON-USER: CPT

## 2023-10-30 PROCEDURE — 99213 OFFICE O/P EST LOW 20 MIN: CPT

## 2023-10-30 PROCEDURE — 1126F AMNT PAIN NOTED NONE PRSNT: CPT

## 2023-10-30 PROCEDURE — 1111F DSCHRG MED/CURRENT MED MERGE: CPT

## 2023-10-30 NOTE — ASSESSMENT & PLAN NOTE
Patient continues to improve clinically.  He is very well-appearing today.  Saturating 96% on room air in the office.  There is mild crackles in bilateral lung bases but has good aeration with no wheezing or increased work of breathing or conversational dyspnea.  Still has mild shortness of breath and cough but they are improving.  His oxygen saturation at home has been consistently greater than 90% since Friday.  He is almost done with his Z-Jace and Augmentin so encouraged patient to finish these.  He may also continue using Mucinex as needed.  Recommended that he follow-up if symptoms worsen.  Strict ED precautions given.

## 2023-10-30 NOTE — PROGRESS NOTES
Subjective   Jamar Keenan is a 73 y.o. male who presents for Follow-up.  Patient is following up again for pneumonia sequelae and low oxygen saturation.  He was last seen on Friday, 10/27/2023.  At that time he had still been occasionally dipping into the high 80%'s on room air however stayed above 88% consistently with an ambulatory pulse ox.  At that time a procalcitonin was ordered which was normal.  He was also started on a new Z-Jace and short course of Augmentin.  Since that visit he feels that his oxygen has always been greater than 90% since he is checking at home and if he breathes deep he can get it into the high 90s.  Last night was the first night that he is not having any more wheezing when he lays down to sleep at night.  His cough and shortness of breath are still there but are getting better.  He is not having any fevers or chills.  He is still drinking well but has had a decreased appetite since getting sick.  He is almost done with his course of antibiotics he is still using Mucinex.    Objective   /82 (BP Location: Right arm, Patient Position: Sitting, BP Cuff Size: Adult)   Pulse 84   Temp 36.5 °C (97.7 °F)   Resp 18   Wt 95.3 kg (210 lb)   SpO2 96%   BMI 26.96 kg/m²    PHYSICAL EXAM  Gen: Well appearing, in NAD  Eyes: EOMI  HEENT: MMM  Heart: RRR, no murmurs  Lungs: No increased work of breathing, mild crackles in bilateral lung bases, good aeration, no wheezes or rhonchi, productive cough heard, on RA  GI: Soft, NTND, no guarding or rebound  Extremities: WWP, cap refill <2sec, no pitting edema in LE b/l  Neuro: Alert, symmetrical facies, moves all extremities equally  Psych: Appropriate mood and affect    Assessment/Plan     Problem List Items Addressed This Visit       Pneumonia of right upper lobe due to infectious organism - Primary     Patient continues to improve clinically.  He is very well-appearing today.  Saturating 96% on room air in the office.  There is mild crackles in  bilateral lung bases but has good aeration with no wheezing or increased work of breathing or conversational dyspnea.  Still has mild shortness of breath and cough but they are improving.  His oxygen saturation at home has been consistently greater than 90% since Friday.  He is almost done with his Z-Jace and Augmentin so encouraged patient to finish these.  He may also continue using Mucinex as needed.  Recommended that he follow-up if symptoms worsen.  Strict ED precautions given.           Molly Barahona DO  Family Medicine Resident, PGY-3  [facility]  [address] [place] [address] [phone]

## 2023-10-30 NOTE — PROGRESS NOTES
I reviewed the resident/fellow's documentation and discussed the patient with the resident/fellow. I agree with the resident/fellow's medical decision making as documented in the note.   Patient denies any chest pain or shortness of breath with the resident and staff.  Patient's been ambulating.  He is feeling a lot better.  He was given more antibiotics few days ago.  He patient states he is feeling better slowly getting better.  He did not complete the antibiotics.  We can repeat chest x-ray in 1 to 2 weeks to ensure clearance.  Patient aware if any chest pain shortness of breath any dyspnea any worsening symptoms go to the ER  Follow-up in 2 weeks  Agree with assessment and plan  Rupert Nance, DO

## 2023-10-31 ENCOUNTER — TREATMENT (OUTPATIENT)
Dept: PHYSICAL THERAPY | Facility: CLINIC | Age: 73
End: 2023-10-31
Payer: MEDICARE

## 2023-10-31 ENCOUNTER — TELEPHONE (OUTPATIENT)
Dept: PRIMARY CARE | Facility: CLINIC | Age: 73
End: 2023-10-31
Payer: MEDICARE

## 2023-10-31 ENCOUNTER — DOCUMENTATION (OUTPATIENT)
Dept: PHYSICAL THERAPY | Facility: CLINIC | Age: 73
End: 2023-10-31
Payer: MEDICARE

## 2023-10-31 DIAGNOSIS — S76.119D QUADRICEPS TENDON RUPTURE, UNSPECIFIED LATERALITY, SUBSEQUENT ENCOUNTER: Primary | ICD-10-CM

## 2023-10-31 PROCEDURE — 97110 THERAPEUTIC EXERCISES: CPT | Mod: GP | Performed by: PHYSICAL THERAPIST

## 2023-10-31 PROCEDURE — 97164 PT RE-EVAL EST PLAN CARE: CPT | Mod: GP | Performed by: PHYSICAL THERAPIST

## 2023-10-31 RX ORDER — ALBUTEROL SULFATE 0.83 MG/ML
3 SOLUTION RESPIRATORY (INHALATION) ONCE AS NEEDED
Status: CANCELLED | OUTPATIENT
Start: 2023-10-31

## 2023-10-31 RX ORDER — EPINEPHRINE 0.3 MG/.3ML
0.3 INJECTION SUBCUTANEOUS AS NEEDED
Status: CANCELLED | OUTPATIENT
Start: 2023-10-31

## 2023-10-31 RX ORDER — DIPHENHYDRAMINE HYDROCHLORIDE 50 MG/ML
50 INJECTION, SOLUTION INTRAMUSCULAR; INTRAVENOUS ONCE AS NEEDED
Status: CANCELLED | OUTPATIENT
Start: 2023-10-31

## 2023-10-31 ASSESSMENT — ENCOUNTER SYMPTOMS
OCCASIONAL FEELINGS OF UNSTEADINESS: 0
LOSS OF SENSATION IN FEET: 0
DEPRESSION: 0

## 2023-10-31 ASSESSMENT — PAIN SCALES - GENERAL: PAINLEVEL_OUTOF10: 0 - NO PAIN

## 2023-10-31 ASSESSMENT — PAIN - FUNCTIONAL ASSESSMENT: PAIN_FUNCTIONAL_ASSESSMENT: 0-10

## 2023-10-31 NOTE — TELEPHONE ENCOUNTER
Result Communication    Resulted Orders   Procalcitonin   Result Value Ref Range    Procalcitonin 0.02 <=0.07 ng/mL    Narrative    Procalcitonin (PCT) results measured serially can  aid in decision-making for antibiotic discontinuation in  patients with suspected or confirmed sepsis in conjunction  with additional clinical information. Antibiotic  discontinuation may be considered with a change in PCT of  >80% from the peak result or when PCT falls below 0.50 ng/mL.    Procalcitonin results should not be used in isolation but  should be interpreted in conjunction with additional clinical  and laboratory findings. Procalcitonin results should not be  used to guide the initiation of antibiotic therapy.    Falsely low PCT values in the presence of bacterial infection  may occur in early infection, with atypical pathogens,  localized infections, and subacute infectious endocarditis.    Falsely elevated results outside of severe bacterial  infection/sepsis may be seen in patients with renal failure  or insufficiency, severe trauma or burns, recent major  abdominal/cardiac surgery, acute multi-organ failure, rarely  in patients with medullary thyroid carcinoma and rare  neuroendocrine tumors, and non-specific interfering antibodies  (heterophile antibodies, rheumatoid factor, human anti-mouse  antibodies (HAMA), etc).    Performance of the PCT test in pediatric patients (<17yo),  pregnant women, immunocompromised patients, and patients on  immunomodulatory medications has not been evaluated.       12:02 PM      Results were successfully communicated with the patient and they acknowledged their understanding.

## 2023-10-31 NOTE — PROGRESS NOTES
Physical Therapy    Physical Therapy Treatment    Patient Name: Wiliam Keenan  MRN: [id number]  Today's Date: 10/31/2023  Time Calculation  Start Time: 0845  Stop Time: 0930  Time Calculation (min): 45 min  Insurance reviewed   Visit number: 13   Approved number of visits: 18   Medicare  100% UCR  unlimited V/Y; KX p 20th   Onset Date: 07/ 17/ 2023       Assessment:  Patient has completed 13 therapy visits up to this point, and have met 3/10 established therapy goals. Re-evaluation performed this date due to patient's change in medical status following the hospitalization. The patient has progressed well, and has shown improvements in pain intensity, strength, flexibility, and mobility. At this time, the patient remains below functional baseline with intermittent pain in the knee, strength of the lower extremity, flexibility of the knee, and mobility. These deficits impair the patient's ability to stand for extended periods of time, ambulate, and perform all desired activities. They would benefit from continued skilled therapy at this time to continue to promote functional gains and a return to prior level of function. An additional 5 visits added to POC.    PT Assessment  PT Assessment Results: Decreased strength, Decreased range of motion, Decreased endurance, Impaired balance, Decreased mobility, Pain  Rehab Prognosis: Good    Plan:  OP PT Plan  Treatment/Interventions: Aquatic therapy, Cryotherapy, Dry needling, Education/ Instruction, Electrical stimulation, Hot pack, Manual therapy, Neuromuscular re-education, Self care/ home management, Taping techniques, Therapeutic activities, Therapeutic exercises, Ultrasound, Vasopneumatic device  PT Plan: Skilled PT  PT Frequency: 1 time per week  Duration: 5 visits  Onset Date: 07/17/23  Certification Period Start Date: 10/31/23  Certification Period End Date: 01/29/24  Rehab Potential: Good  Continue to progress knee flexion AROM, and strength/stability of the  "quadriceps  Current Problem  1. Quadriceps tendon rupture, unspecified laterality, subsequent encounter              Subjective   Pt reports back to PT following a hospitalization due to pneumonia. Patient reports that he is feeling much better, and breathing has improved steadily. He denies pain at this time.   Precautions  Precautions  STEADI Fall Risk Score (The score of 4 or more indicates an increased risk of falling): 3    Pain  Pain Assessment: 0-10  Pain Score: 0 - No pain    Objective     %SpO2 start of session: 93%  %SpO2 throughout session: 93-94%  %SpO2 at end of session: 92%    Knee AROM (*indicates pain): R from IE   Flexion R 128, L 107*  Extension R 0, L 0    Knee PROM (*indicates pain):   Flexion R NT, L 113*  Extension R NT, L 1    LE strength (*indicates pain): R from IE4  Knee flexion R 5/5, L 4+/5  Knee extension R 5/5, L 4/5  Hip flexion R 4+/5, L 4+/5  Hip abd R 4+/5, L 4+/5    Hamstring strength:  R 47.5#  L 42.7#    Quad strength:  R 64.5#  L 50.1#  =77.6%    SLR: pt able to complete 20 consecutive SLR without quad lag or pain in the knee  Stairs: patient able to ascend and descend one flight of stairs reciprocally and without pain  Lunges: patient is able to complete 15 fwd and lateral lunges without pain, but significant fatigue and increased hip adduction/internal rotation noted   Gait: pt demonstrates relatively normal gait mechanics within brace    LEFS: 63/80    Treatments:  Obj measures and goals review  Rocking on bike SH 16.5 L0 x5'   QS x 7 min NMES NT  SLR x20 3 min NMES NT  heel slide with strap x15  Standing hip extension/abd x15 ea NT  Knee extension 0-90 2# 3x10 NT  HSC GTB 2x10 NT  weight shifts with brace unlocked 15x3\" NT  Mini squats FT/FA x10 ea NT  multi angle extension isometrics between 80-15 5\" hold ea NT  calf raises x15 NT  fwd mini lunges x15 NT  lat mini lunges on step x15 NT  TKE BTB x30   Ball on wall squats 2x10 NT  Heel taps 4\" x10 NT  (15')    PT " Re-Evaluation  (30')    HEP Added:    Exercises  - Standing Terminal Knee Extension with Resistance  - 1 x daily - 7 x weekly - 2 sets - 10 reps - 3 sec hold    Goals:  By discharge:  1. Patient will report and demonstrate independence with current HEP ONGOING  2. Patient will demonstrate AROM of the L knee 0-115 to improve their ability to ambulate, negotiate stairs, and squat without restriction PROGRESSING   3. Patient will demonstrate the ability to ascend/descend one flight of stairs reciprocally and without an increase in pain to improve function within the home and the community MET  4. Patient will demonstrate gross hip and knee strength >/= 4+/5 to improve the ability to ambulate, squat, and lift without restrictions PROGRESSING   5. Patient will demonstrate an increase in Lower Extremity Functional Scale score by >/= 37 points to 55/80 to meet established MCID (baseline 8/3/23/: 18/80) MET  6. Patient will demonstrate the ability to ambulate >/= 150' outside of the brace, independently, and without major deviations in gait PROGRESSING   7. Patient will demonstrate the ability to perform 20 consecutive SLR without obvious quadriceps lag to indicate improving quadriceps activation MET  8. Patient will report pain of 0/10 at rest, and no greater than 2/10 with any activity including walking, stairs, squatting, and lunging PROGRESSING  9. Patient will demonstrate the ability to perform 15 forward and lateral lunges without pain in the left knee exceeding 2/10 PARTIALLY MET  Added 9/26/23:  10. Patient will demonstrate quadriceps and hamstring activation on the left >/= 80% of the contralateral side to improve his ability to ambulate, stand, negotiate stairs, and squat PROGRESSING

## 2023-10-31 NOTE — LETTER
October 31, 2023    Gustavo Norris PT  [address] [facility]  Rehab Services  [facility] [address]    Patient: Jamar Keenan   YOB: 1950   Date of Visit: 10/31/2023       Dear No referring provider defined for this encounter.    The attached plan of care is being sent to you because your patient’s medical reimbursement requires that you certify the plan of care. Your signature is required to allow uninterrupted insurance coverage.      You may indicate your approval by signing below and faxing this form back to us at Dept Fax: [phone].    Please call Dept: [phone] with any questions or concerns.    Thank you for this referral,        Gustavo Norris PT  [place] [address] [facility]  [address] [facility] [address]    Payer: Payor: MEDICARE / Plan: MEDICARE PART A AND B / Product Type: *No Product type* /                                                                         Date:     Dear Gustavo Norris PT,     Re: Mr. Wiliam Keenan, MRN:[id number]    I certify that I have reviewed the attached plan of care and it is medically necessary for Mr. Wiliam Keenan (1950) who is under my care.          ______________________________________                    _________________  Provider name and credentials                                           Date and time                                                                                           Plan of Care 10/31/23   Effective from: 10/31/2023  Effective to: 1/29/2024    Plan ID: [id number]            Participants as of Finalize on 10/31/2023    Name Type Comments Contact Info    Gustavo Norris PT Physical Therapist  [phone]    Stone Brambila MD Consulting Physician  [phone]       Last Plan Note     Author: Gustavo Norris PT Status: Signed Last edited: 10/31/2023  9:01 AM       PT contacted family medicine on 10/30/23 regarding clearance to return to physical therapy following  hospitalization for pneumonia. PT still awaiting response at this time, however PT elects to continue to treatment with close monitoring of %SpO2 due to patient's post-operative status and extended time outside of formal PT.     PT will adjust POC as needed per family medicine recommendations.          Current Participants as of 10/31/2023    Name Type Comments Contact Info    Gustavo Norris, GEORGIE Physical Therapist  [phone]    Signature pending    Stone Brambila MD Consulting Physician  [phone]    Signature pending

## 2023-10-31 NOTE — TELEPHONE ENCOUNTER
----- Message from Keaton Lux DO sent at 10/28/2023  8:56 PM EDT -----  My recommendation is to get a chest x-ray and CBC with differential.  Even though the original pneumonia did not show up on chest x-ray it may now so we will not miss anything by getting chest film.  Keep close eye on this patient if necessary may need to repeat CT as well/pulmonary referral  ----- Message -----  From: Lab, Background User  Sent: 10/28/2023  11:33 AM EDT  To: Keaton Lux DO

## 2023-10-31 NOTE — PATIENT INSTRUCTIONS
Access Code: [email or web address]  URL: [email or web address]  Date: 10/31/2023  Prepared by: Gustavo Norris    Exercises  - Standing Terminal Knee Extension with Resistance  - 1 x daily - 7 x weekly - 2 sets - 10 reps - 3 sec hold

## 2023-10-31 NOTE — PROGRESS NOTES
PT contacted family medicine on 10/30/23 regarding clearance to return to physical therapy following hospitalization for pneumonia. PT still awaiting response at this time, however PT elects to continue to treatment with close monitoring of %SpO2 due to patient's post-operative status and extended time outside of formal PT.     PT will adjust POC as needed per family medicine recommendations.

## 2023-11-02 ENCOUNTER — PATIENT OUTREACH (OUTPATIENT)
Dept: PRIMARY CARE | Facility: CLINIC | Age: 73
End: 2023-11-02
Payer: MEDICARE

## 2023-11-06 ENCOUNTER — HOSPITAL ENCOUNTER (OUTPATIENT)
Dept: RADIOLOGY | Facility: HOSPITAL | Age: 73
Discharge: HOME | End: 2023-11-06

## 2023-11-06 ENCOUNTER — HOSPITAL ENCOUNTER (OUTPATIENT)
Dept: RESEARCH | Facility: HOSPITAL | Age: 73
Discharge: HOME | End: 2023-11-06

## 2023-11-06 VITALS
WEIGHT: 209.44 LBS | RESPIRATION RATE: 16 BRPM | BODY MASS INDEX: 26.89 KG/M2 | OXYGEN SATURATION: 97 % | DIASTOLIC BLOOD PRESSURE: 69 MMHG | TEMPERATURE: 97.7 F | SYSTOLIC BLOOD PRESSURE: 135 MMHG | HEART RATE: 60 BPM

## 2023-11-06 DIAGNOSIS — Z00.6 ENCOUNTER FOR EXAMINATION FOR NORMAL COMPARISON AND CONTROL IN CLINICAL RESEARCH PROGRAM: ICD-10-CM

## 2023-11-06 DIAGNOSIS — Z00.6 CLINICAL TRIAL PARTICIPANT: ICD-10-CM

## 2023-11-06 PROCEDURE — 70551 MRI BRAIN STEM W/O DYE: CPT | Mod: LIO

## 2023-11-06 PROCEDURE — 2500000005 HC RX 250 GENERAL PHARMACY W/O HCPCS: Performed by: PSYCHIATRY & NEUROLOGY

## 2023-11-06 PROCEDURE — 70551 MRI BRAIN STEM W/O DYE: CPT | Mod: LIO | Performed by: STUDENT IN AN ORGANIZED HEALTH CARE EDUCATION/TRAINING PROGRAM

## 2023-11-06 RX ADMIN — Medication 981 MG: at 09:18

## 2023-11-06 ASSESSMENT — PAIN SCALES - GENERAL: PAINLEVEL: 0-NO PAIN

## 2023-11-06 NOTE — RESEARCH NOTES
[id number] RESEARCH CLINICAL VISIT NOTE  Study Name: AHEAD 3-45 [id number]  IRB#: [id number]  [id number]  Protocol Version Dated: V5 08-APR-2022  PI: Joseph    Time point: A3 Visit 33 Week 108, A3 Visit 39 Week 132, A3 Visit 45 Week 156, A3 Visit 51 Week 180, A3 Visit 57 Week 204, ALL OTHER INFUSION ONLY VISITS    Encounter Date: 11/06/2023  Encounter Time:  8:00 AM EST  Encounter Department: [facility] HEMATOLOGY AND ONCOLOGY     #1    Phone Pager   Laura Adam       #2 Phone Pager   Christin Boyce     Other Phone Pager   Mily Salmeron       Visit Provider: [facility] ROOM 1 [facility]    Subjective   Jamar Keenan is a 73 y.o. male and is here for a Research clinical visit.    Allergies:   Allergies   Allergen Reactions    Bee Venom Protein (Honey Bee) Unknown    Poison Ivy Extract Unknown       Study Regimen and Dosing   A3-45 Trial-The study is a 216-week treatment, multicenter, double-blind, placebo-controlled, parallel-treatment arm study in subjects with preclinical AD and elevated amyloid (A45 Trial) and subjects with early preclinical AD and intermediate amyloid beta (A?i) (A3 Trial)   A-45 Trial:  [id number] placebo:  5 mg/kg IV Q2W through 8 weeks (titration), then 10 mg/kg IV Q2W through 96 weeks (induction), then 10 mg/kg IV Q4W through 216 weeks (maintenance).       Admission and Prior to Starting Study Activities     Obtain weight in kilograms - with shoes off & heavy items removed.  Verify with coordinator there is not a 10% change in weight from previous visits weight.   Confirm Physical & Neurological Exam completed by PI.   Study Questionnaires - Coordinator will obtain prior to dosing.  Insert one peripheral IV line for sample collection procedures (flush line with 5 - 10 mL normal saline following each blood draw).  Access mediport (if available) otherwise insert second peripheral line in opposite arm for Investigative drug administration (if  peripheral line, flush line with 5 - 10 mL normal saline before & after infusion)Exceptions:      Criteria to Treat   DCRU RN reviewed and meets eligibility to proceed with treatment plan   Time team notified: 0816   DCRU RN notifies study team to review eligibility and approval before dosing procedures  Time team approves: 0816      Dietary Guidelines   Regular Diet        Objective   Vital Signs:   Vitals:    11/06/23 0809 11/06/23 0911 11/06/23 1020   BP: 126/79 124/75 131/82   Pulse: 79 56 60   Resp: 16 16 16   Temp: 36.4 °C (97.5 °F) 36.5 °C (97.7 °F) 36.5 °C (97.7 °F)   TempSrc: Oral Oral Oral   SpO2: 92% 93% 95%   Weight: 95 kg (209 lb 7 oz)     PainSc: 0-No pain         ASSESSMENT and PLAN:  Problem List Items Addressed This Visit    None  Visit Diagnoses       Clinical trial participant        Relevant Medications    Study AHEAD [id number] or placebo 981 mg in sodium chloride 0.9% 250 mL IV (Completed)            Medications as of the completion of today's visit:  Administrations This Visit       Study AHEAD [id number] or placebo 981 mg in sodium chloride 0.9% 250 mL IV       Admin Date  11/06/2023 Action  New Bag Dose  981 mg Rate  250 mL/hr Route  intravenous Administered By  Dunia Magaña RN                    Orders placed during today's visit:  No orders of the defined types were placed in this encounter.      [id number] - AHEAD - A3 A45    This study has not been configured for documentation in the Research Adverse Events activity.      Study Specific Instructions and Documentation           Enter a snapshot of performable actions in the order performed:  Premedication  Pre-Dose Vitals- use same arm for BP for all visits  Study drug infusion  30 min post infusion observation   Post-Dose Vital Signs- same arm for all visits- EOI  Post-Dose Vital Signs- same arm for all visits- just prior to discharge     Weight-Based Dosing     Sponsor allows participant weight taken from previous VISIT to calculate dose  unless there is a noted weight difference of 10% weight gain or loss      Reference weight (kg) provided by coordinator NA  Date measured NA  Actual weight   Vitals:    11/06/23 0809   Weight: 95 kg (209 lb 7 oz)     Date Measured 11/06/2023       Safety Parameters and Special Instructions   [id number] is a monoclonal antibody.   Amyloid is a protein that builds up in the brains of people who have Alzheimer's disease (AD). This build-up of amyloid protein in the brain is called “amyloid plaque” and may lead to impairment in memory and thinking.  [id number] binds to amyloid in the brain and has been shown to reduce the amount of this abnormal protein.   Side effects:   Infusion reactions (Flushing, skin rash, fever, chills, general body aches, feeling shaky, swelling tissues, muscle constrictions, shortness of breath), abnormally low blood pressure,  headache, vasogenic edema (build-up of fluid in the brain most often temporary), cerebral hemorrhages, diarrhea, nausea, and cough.        PRE-DOSE Medications   Document medication administration in MAR activity.   As ordered  Administer within 30 min prior to dosing       PRE-DOSE Vital Signs   Prior to dosing document following Vitals.  Semi-supine x 3 minutes before obtaining.   BP same arm for all visits  -see coordinator note for which arm to use  HR     Temperature     Respirations     Time obtained: 0911 BP: 124/75   (location left arm) Temp: 36.5   (type) oral Pulse: 56 Respirations: 16     Infusion-Related Reactions   Review Safety Parameters on the medication Order. Follow  Hypersensitivity Orders.       A-3 Dose Level ([place] appropriate dose level)   Medication Name   Titration:  [id number] placebo 5 mg/kg IV Q4W through 8 weeks (Visits 6-7)   Maintenance:   [id number] 10mg/kg or placebo IV every 4 weeks through 216 weeks (Visits 8-60)     Research Drug Administration   Document medication administration in MAR activity. Research specific instructions below.  IRT  Blinded Treatment Vial Number(s) _________________________________will be provided by study team.  Administer [id number] (5mg/kg) or placebo 5 mg x ______ kg = _________ mg IV in 250 mL NaCl 0.9%(Final Volume).     Use 0.2 micron in-line filter.    Administer dose thru NON-DEHP Pathway (non-DEHP Tubing and Bag).  IDS (Investigational Drug Services) will prime filter & tubing with active drug. Infuse over approximately 60 minutes 56  BUD (Beyond Use Date): 24 hours at room temperature.  As IV bag empties, flush line using 50 mL NaCl 0.9% in PhaSeal® syringe to ensure entire dose is given.    Infusion Reactions:  Use [facility] orders. If the infusion reaction improves or resolves, infusion may be resumed if the investigator considers it safe to do so in his/her clinical judgment. If so, then resume the infusion at 50% of the prior rate once the infusion reaction has resolved; the infusion duration should not exceed 2 hours.    Observations time may be discharged 30 min after the end of the infusion if judged medically stable by the investigator.  Covering MD must approve discharge.     Start and End of Infusion times   Start of Infusion:  0918 End of Infusion:  1018     POST-DOSE Vital Signs x 2   Document following Vitals at the time points listed below.    Semi-supine x 3 minutes before obtaining.   BP same arm for all visits  -see coordinator note for which arm to use  HR     Temperature     Respirations       EOI Time obtained: 1020 BP: 131/82   (location left arm) Temp: 36.5   (type) oral Pulse: 60 Respirations: 16   At Least +2 Hours EOI Time obtained: NA BP: NA   (location NA) Temp: NA   (type) NA Pulse: NA Respirations: NA     See flowsheet for vital signs prior to discharge    POST-DOSE Observation   Post Dose Observation End Time 1048     Discharge Instructions   Patient may be discharged to home after observations is complete and vital signs stable.   Remind patient to return for next study visit      Dunia  ALBINA Good  11/06/23

## 2023-11-07 ENCOUNTER — TREATMENT (OUTPATIENT)
Dept: PHYSICAL THERAPY | Facility: CLINIC | Age: 73
End: 2023-11-07
Payer: MEDICARE

## 2023-11-07 DIAGNOSIS — S76.119D QUADRICEPS TENDON RUPTURE, UNSPECIFIED LATERALITY, SUBSEQUENT ENCOUNTER: Primary | ICD-10-CM

## 2023-11-07 DIAGNOSIS — Z00.6 CLINICAL TRIAL EXAM: Primary | ICD-10-CM

## 2023-11-07 PROCEDURE — 97110 THERAPEUTIC EXERCISES: CPT | Mod: GP | Performed by: PHYSICAL THERAPIST

## 2023-11-07 ASSESSMENT — ENCOUNTER SYMPTOMS
DEPRESSION: 0
OCCASIONAL FEELINGS OF UNSTEADINESS: 0
LOSS OF SENSATION IN FEET: 0

## 2023-11-07 ASSESSMENT — PAIN - FUNCTIONAL ASSESSMENT: PAIN_FUNCTIONAL_ASSESSMENT: 0-10

## 2023-11-07 ASSESSMENT — PAIN SCALES - GENERAL: PAINLEVEL_OUTOF10: 0 - NO PAIN

## 2023-11-07 NOTE — PROGRESS NOTES
Physical Therapy    Physical Therapy Treatment    Patient Name: Wiliam Keenan  MRN: [id number]  Today's Date: 11/7/2023  Time Calculation  Start Time: 0845  Stop Time: 0927  Time Calculation (min): 42 min  Insurance reviewed   Visit number: 14   Approved number of visits: 18   Medicare  100% UCR  unlimited V/Y; KX p 20th   Onset Date: 07/ 17/ 2023       Assessment:  Patient's ROM of left knee flexion is improving steadily at this time, but remains below expected values for his current timeframe post surgery, likely due to his recent medical issues. Quadriceps strengthening is improving at this time, but remains less than 80% of the contralateral side requiring him to continue to wear the knee brace. Patient is challenged with new additions this date, but denies increased pain throughout therapeutic exercises. He remains below his functional baseline, and would benefit form additional skilled PT.     PT Assessment  PT Assessment Results: Decreased strength, Decreased range of motion, Decreased endurance, Impaired balance, Decreased mobility, Pain  Rehab Prognosis: Good  Evaluation/Treatment Tolerance: Patient tolerated treatment well    Plan:  OP PT Plan  PT Plan: Skilled PT  Rehab Potential: Good  Continue to progress knee flexion AROM, and strength/stability of the quadriceps  Current Problem  1. Quadriceps tendon rupture, unspecified laterality, subsequent encounter                Subjective   Patient denies pain at start of session. He reports that he is no longer following up with primary care post pneumonia.   Precautions  Precautions  STEADI Fall Risk Score (The score of 4 or more indicates an increased risk of falling): 3    Pain  Pain Assessment: 0-10  Pain Score: 0 - No pain    Objective   %SpO2 at start of session: 93%  %SpO2 throughout session: 93-94%  %SpO2 at end of session: 94%    Per physician message: patient cleared to return to PT following antibiotics, which he finished last week    Knee PROM with  "strap: 112*  Knee PROM with OP from PT: 117*  Treatments:Rocking on bike SH 16.5 L0 x6'   QS x 7 min NMES NT  SLR x20 3 min NMES NT  heel slide with strap x15  Standing hip extension/abd x15 ea NT  Knee extension 0-90 4# 3x10   HSC GTB 2x10 NT  weight shifts with brace unlocked 15x3\" NT  Mini squats FT/FA x10 ea NT  calf raises x15 NT  fwd mini lunges x15 NT  lat mini lunges on step x15 NT  TKE BTB x30   Ball on wall squats 3x10  3 way heel taps x12 ea   SLS 15x5\" ea   (42')      Goals:  By discharge:  1. Patient will report and demonstrate independence with current HEP ONGOING  2. Patient will demonstrate AROM of the L knee 0-115 to improve their ability to ambulate, negotiate stairs, and squat without restriction PROGRESSING   3. Patient will demonstrate the ability to ascend/descend one flight of stairs reciprocally and without an increase in pain to improve function within the home and the community MET  4. Patient will demonstrate gross hip and knee strength >/= 4+/5 to improve the ability to ambulate, squat, and lift without restrictions PROGRESSING   5. Patient will demonstrate an increase in Lower Extremity Functional Scale score by >/= 37 points to 55/80 to meet established MCID (baseline 8/3/23/: 18/80) MET  6. Patient will demonstrate the ability to ambulate >/= 150' outside of the brace, independently, and without major deviations in gait PROGRESSING   7. Patient will demonstrate the ability to perform 20 consecutive SLR without obvious quadriceps lag to indicate improving quadriceps activation MET  8. Patient will report pain of 0/10 at rest, and no greater than 2/10 with any activity including walking, stairs, squatting, and lunging PROGRESSING  9. Patient will demonstrate the ability to perform 15 forward and lateral lunges without pain in the left knee exceeding 2/10 PARTIALLY MET  Added 9/26/23:  10. Patient will demonstrate quadriceps and hamstring activation on the left >/= 80% of the " contralateral side to improve his ability to ambulate, stand, negotiate stairs, and squat PROGRESSING

## 2023-11-08 ENCOUNTER — HOSPITAL ENCOUNTER (OUTPATIENT)
Dept: RADIOLOGY | Facility: HOSPITAL | Age: 73
Discharge: HOME | End: 2023-11-08
Payer: MEDICARE

## 2023-11-08 DIAGNOSIS — Z00.6 CLINICAL TRIAL EXAM: ICD-10-CM

## 2023-11-08 PROCEDURE — 70551 MRI BRAIN STEM W/O DYE: CPT

## 2023-11-08 PROCEDURE — 70551 MRI BRAIN STEM W/O DYE: CPT | Performed by: RADIOLOGY

## 2023-11-12 ENCOUNTER — HOSPITAL ENCOUNTER (OUTPATIENT)
Dept: CARDIOLOGY | Facility: HOSPITAL | Age: 73
Discharge: HOME | End: 2023-11-12
Payer: MEDICARE

## 2023-11-12 PROCEDURE — 93005 ELECTROCARDIOGRAM TRACING: CPT

## 2023-11-14 ENCOUNTER — TREATMENT (OUTPATIENT)
Dept: PHYSICAL THERAPY | Facility: CLINIC | Age: 73
End: 2023-11-14
Payer: MEDICARE

## 2023-11-14 DIAGNOSIS — S76.119D QUADRICEPS TENDON RUPTURE, UNSPECIFIED LATERALITY, SUBSEQUENT ENCOUNTER: Primary | ICD-10-CM

## 2023-11-14 PROCEDURE — 97110 THERAPEUTIC EXERCISES: CPT | Mod: GP | Performed by: PHYSICAL THERAPIST

## 2023-11-14 ASSESSMENT — ENCOUNTER SYMPTOMS
DEPRESSION: 0
LOSS OF SENSATION IN FEET: 0
OCCASIONAL FEELINGS OF UNSTEADINESS: 0

## 2023-11-14 ASSESSMENT — PAIN SCALES - GENERAL: PAINLEVEL_OUTOF10: 0 - NO PAIN

## 2023-11-14 ASSESSMENT — PAIN - FUNCTIONAL ASSESSMENT: PAIN_FUNCTIONAL_ASSESSMENT: 0-10

## 2023-11-14 NOTE — PROGRESS NOTES
Physical Therapy    Physical Therapy Treatment    Patient Name: Wiliam Keenan  MRN: [id number]  Today's Date: 11/14/2023  Time Calculation  Start Time: 0847  Stop Time: 0928  Time Calculation (min): 41 min  Insurance reviewed   Visit number: 15   Approved number of visits: 18   Medicare  100% UCR  unlimited V/Y; KX p 20th   Onset Date: 07/ 17/ 2023       Assessment:  Patient tolerates all treatment without reports of increased pain in the left lower extremity. Significantly reduced fatigue is noted this date compared to previous sessions as well, indicating improving muscular endurance. New additions were tolerated well, but patient demonstrates difficulty with single leg balance and stability. ROM is improving, and is approaching goal values. Patient able to achieve approximately 95% of strength on the left compared to the right, Per physician noted on 10/3/23 brace is now discharge, but PT instructs patient to continue with the brace if any increase in pain or giving way is found. He is leaving for a trip for a few weeks, and PT will plan to continue with POC upon his return. He remains a good candidate for additional skilled PT.   PT Assessment  PT Assessment Results: Decreased strength, Decreased range of motion, Decreased endurance, Impaired balance, Decreased mobility, Pain  Rehab Prognosis: Good    Plan:  OP PT Plan  PT Plan: Skilled PT  Rehab Potential: Good  Continue to progress knee flexion AROM, and strength/stability of the quadriceps  Current Problem  1. Quadriceps tendon rupture, unspecified laterality, subsequent encounter            Subjective   Patient reports that he has been working very hard on his exercises while at home, both stretching and strengthening. However, he forgot to wear his brace this morning.   Precautions  Precautions  STEADI Fall Risk Score (The score of 4 or more indicates an increased risk of falling): 30    Pain  Pain Assessment: 0-10  Pain Score: 0 - No pain    Objective  "  %SpO2 at start of session: 95%  %SpO2 throughout session: 95-96%      Knee PROM with strap: 116*  Knee PROM with OP from PT: 121*  Knee AROM: 114*      R quadriceps strength (lb) (best of 3): 54.5  L quadriceps strength (lb) (best of 3): 52  =~95% uninvolved side     Treatments:  Rocking on bike SH 16.5 L0 x6'   heel slide with strap x15  Standing hip extension/abd x15 ea NT  Knee extension 0-90 4# 3x10 NT  HSC GTB 2x10 NT  weight shifts with brace unlocked 15x3\" NT  Mini squats FT/FA x10 ea NT  calf raises x15 NT  fwd mini lunges x15 NT  lat mini lunges on step x15 NT  TKE BTB x30 NT  Ball on wall squats 3x10 NT  3 way heel taps x12 ea   SLS 15x5\" ea NT  SLS 3 way rebounder x10 ea   Resisted walking fwd/back x6 ea (heavy)  (41')      Goals:  By discharge:  1. Patient will report and demonstrate independence with current HEP ONGOING  2. Patient will demonstrate AROM of the L knee 0-115 to improve their ability to ambulate, negotiate stairs, and squat without restriction PROGRESSING   3. Patient will demonstrate the ability to ascend/descend one flight of stairs reciprocally and without an increase in pain to improve function within the home and the community MET  4. Patient will demonstrate gross hip and knee strength >/= 4+/5 to improve the ability to ambulate, squat, and lift without restrictions PROGRESSING   5. Patient will demonstrate an increase in Lower Extremity Functional Scale score by >/= 37 points to 55/80 to meet established MCID (baseline 8/3/23/: 18/80) MET  6. Patient will demonstrate the ability to ambulate >/= 150' outside of the brace, independently, and without major deviations in gait PROGRESSING   7. Patient will demonstrate the ability to perform 20 consecutive SLR without obvious quadriceps lag to indicate improving quadriceps activation MET  8. Patient will report pain of 0/10 at rest, and no greater than 2/10 with any activity including walking, stairs, squatting, and lunging " PROGRESSING  9. Patient will demonstrate the ability to perform 15 forward and lateral lunges without pain in the left knee exceeding 2/10 PARTIALLY MET  Added 9/26/23:  10. Patient will demonstrate quadriceps and hamstring activation on the left >/= 80% of the contralateral side to improve his ability to ambulate, stand, negotiate stairs, and squat PROGRESSING

## 2023-11-20 ENCOUNTER — PATIENT OUTREACH (OUTPATIENT)
Dept: PRIMARY CARE | Facility: CLINIC | Age: 73
End: 2023-11-20
Payer: MEDICARE

## 2023-11-20 NOTE — PROGRESS NOTES
Call placed regarding one month post discharge follow up call. At time of outreach call, the patient states he is feeling very well. Denies SOB. States his pulse ox has been running in the mid to upper 90's. No questions or concerns at this time.

## 2023-11-29 ENCOUNTER — TREATMENT (OUTPATIENT)
Dept: PHYSICAL THERAPY | Facility: CLINIC | Age: 73
End: 2023-11-29
Payer: MEDICARE

## 2023-11-29 DIAGNOSIS — S76.119D QUADRICEPS TENDON RUPTURE, UNSPECIFIED LATERALITY, SUBSEQUENT ENCOUNTER: Primary | ICD-10-CM

## 2023-11-29 PROCEDURE — 97110 THERAPEUTIC EXERCISES: CPT | Mod: GP,KX | Performed by: PHYSICAL THERAPIST

## 2023-11-29 ASSESSMENT — PAIN - FUNCTIONAL ASSESSMENT: PAIN_FUNCTIONAL_ASSESSMENT: 0-10

## 2023-11-29 ASSESSMENT — ENCOUNTER SYMPTOMS
LOSS OF SENSATION IN FEET: 0
DEPRESSION: 0
OCCASIONAL FEELINGS OF UNSTEADINESS: 0

## 2023-11-29 ASSESSMENT — PAIN SCALES - GENERAL: PAINLEVEL_OUTOF10: 0 - NO PAIN

## 2023-11-29 NOTE — PROGRESS NOTES
"Physical Therapy    Physical Therapy Treatment    Patient Name: Wiliam Keenan  MRN: [id number]  Today's Date: 11/29/2023  Time Calculation  Start Time: 0848  Stop Time: 0927  Time Calculation (min): 39 min  Insurance reviewed   Visit number: 16   Approved number of visits: 18   Medicare  100% UCR  unlimited V/Y; KX p 20th   Onset Date: 07/ 17/ 2023       Assessment:  Patient demonstrates good tolerance to all treatment this date, without reports of increased pian in the left quadriceps. Increased fatigue is found after therapeutic exercises, as expected by PT due increased difficulty of activity. ROM for knee flexion remains restricted, but is progressing slowly at this time. The patient has made good progress towards his established therapy goals, but does remain below his baseline function at this time. He is set to follow up with orthopedics next week, and PT will plan to re-evaluate for possible discharge in the next 1-2 visits provided no regression in status prior to that time.   PT Assessment  PT Assessment Results: Decreased strength, Decreased range of motion, Decreased endurance, Impaired balance, Decreased mobility, Pain  Rehab Prognosis: Good    Plan:  OP PT Plan  PT Plan: Skilled PT  Rehab Potential: Good  Continue to progress knee flexion AROM, and strength/stability of the quadriceps  Current Problem  1. Quadriceps tendon rupture, unspecified laterality, subsequent encounter              Subjective   Patient returns from a trip noting that the knee has felt good over the past 1-2 weeks. He reports very little pain, but believes that he has begun to hit a \"block\" with ROM.   Precautions  Precautions  STEADI Fall Risk Score (The score of 4 or more indicates an increased risk of falling): 3    Pain  Pain Assessment: 0-10  Pain Score: 0 - No pain    Objective   Knee PROM with strap: 120*  Knee PROM with OP from PT: 123*    Treatments:  Rocking on bike SH 16.5 L0 x6'   heel slide with strap x15  Knee flexion " "stretch at step x10  Standing hip extension/abd x15 ea NT  Knee extension 0-90 4# 3x10 NT  HSC GTB 2x10 NT  weight shifts with brace unlocked 15x3\" NT  Mini squats FT/FA x10 ea NT  calf raises x15 NT  Fwd/bwd/lat lunges x10 ea   TKE BTB x30 NT  Ball on wall squats 3x10 NT  3 way heel taps x12 ea NT  SLS 15x5\" ea NT  SLS 3 way rebounder x12 ea   Resisted walking 4 way x6 ea (heavy)  Leg press 5 1/2 pl 2x10  (39')      Goals:  By discharge:  1. Patient will report and demonstrate independence with current HEP ONGOING  2. Patient will demonstrate AROM of the L knee 0-115 to improve their ability to ambulate, negotiate stairs, and squat without restriction PROGRESSING   3. Patient will demonstrate the ability to ascend/descend one flight of stairs reciprocally and without an increase in pain to improve function within the home and the community MET  4. Patient will demonstrate gross hip and knee strength >/= 4+/5 to improve the ability to ambulate, squat, and lift without restrictions PROGRESSING   5. Patient will demonstrate an increase in Lower Extremity Functional Scale score by >/= 37 points to 55/80 to meet established MCID (baseline 8/3/23/: 18/80) MET  6. Patient will demonstrate the ability to ambulate >/= 150' outside of the brace, independently, and without major deviations in gait PROGRESSING   7. Patient will demonstrate the ability to perform 20 consecutive SLR without obvious quadriceps lag to indicate improving quadriceps activation MET  8. Patient will report pain of 0/10 at rest, and no greater than 2/10 with any activity including walking, stairs, squatting, and lunging PROGRESSING  9. Patient will demonstrate the ability to perform 15 forward and lateral lunges without pain in the left knee exceeding 2/10 PARTIALLY MET  Added 9/26/23:  10. Patient will demonstrate quadriceps and hamstring activation on the left >/= 80% of the contralateral side to improve his ability to ambulate, stand, negotiate " stairs, and squat PROGRESSING

## 2023-12-04 ENCOUNTER — HOSPITAL ENCOUNTER (OUTPATIENT)
Dept: RESEARCH | Facility: HOSPITAL | Age: 73
Discharge: HOME | End: 2023-12-04
Payer: MEDICARE

## 2023-12-04 VITALS
HEART RATE: 54 BPM | WEIGHT: 216.05 LBS | DIASTOLIC BLOOD PRESSURE: 78 MMHG | BODY MASS INDEX: 27.74 KG/M2 | TEMPERATURE: 98.2 F | RESPIRATION RATE: 18 BRPM | OXYGEN SATURATION: 97 % | SYSTOLIC BLOOD PRESSURE: 133 MMHG

## 2023-12-04 DIAGNOSIS — T78.40XA CURRENT HYPERSENSITIVITY REACTION, INITIAL ENCOUNTER: ICD-10-CM

## 2023-12-04 DIAGNOSIS — Z00.6 CLINICAL TRIAL PARTICIPANT: ICD-10-CM

## 2023-12-04 LAB
HOLD SPECIMEN: NORMAL

## 2023-12-04 PROCEDURE — 36415 COLL VENOUS BLD VENIPUNCTURE: CPT | Performed by: PSYCHIATRY & NEUROLOGY

## 2023-12-04 PROCEDURE — 96365 THER/PROPH/DIAG IV INF INIT: CPT

## 2023-12-04 PROCEDURE — 2500000005 HC RX 250 GENERAL PHARMACY W/O HCPCS: Performed by: PSYCHIATRY & NEUROLOGY

## 2023-12-04 RX ORDER — ALBUTEROL SULFATE 0.83 MG/ML
3 SOLUTION RESPIRATORY (INHALATION) ONCE AS NEEDED
Status: DISCONTINUED | OUTPATIENT
Start: 2023-12-04 | End: 2023-12-05 | Stop reason: HOSPADM

## 2023-12-04 RX ORDER — FAMOTIDINE 10 MG/ML
20 INJECTION INTRAVENOUS ONCE AS NEEDED
Status: DISCONTINUED | OUTPATIENT
Start: 2023-12-04 | End: 2023-12-05 | Stop reason: HOSPADM

## 2023-12-04 RX ORDER — EPINEPHRINE 0.3 MG/.3ML
0.3 INJECTION SUBCUTANEOUS AS NEEDED
Status: DISCONTINUED | OUTPATIENT
Start: 2023-12-04 | End: 2023-12-05 | Stop reason: HOSPADM

## 2023-12-04 RX ORDER — DIPHENHYDRAMINE HYDROCHLORIDE 50 MG/ML
50 INJECTION, SOLUTION INTRAMUSCULAR; INTRAVENOUS ONCE AS NEEDED
Status: DISCONTINUED | OUTPATIENT
Start: 2023-12-04 | End: 2023-12-05 | Stop reason: HOSPADM

## 2023-12-04 RX ADMIN — Medication 953 MG: at 12:07

## 2023-12-04 NOTE — RESEARCH NOTES
"DCRU RESEARCH CLINICAL VISIT NOTE  Study Name: AHEAD 3-45 [id number]  IRB#: [id number]  [facility]#: [id number]  Protocol Version Dated: V5 08-APR-2022  PI: Joseph    Time point: A3 Visit 33 Week 108, A3 Visit 39 Week 132, A3 Visit 45 Week 156, A3 Visit 51 Week 180, A3 Visit 57 Week 204, ALL OTHER INFUSION ONLY VISITS    Encounter Date: 12/04/2023  Encounter Time:  9:00 AM EST  Encounter Department: [facility] HEMATOLOGY AND ONCOLOGY     #1    Phone Pager   Laura Adam       #2 Phone Pager   Christinleda Boyce     Other Phone Pager   Mily Salmeron       Visit Provider: [address] [place] [address] ROOM 6 [facility]   Wiliam Keenan \"Bill\" is a 73 y.o. male and is here for a Research clinical visit.    Allergies:   Allergies   Allergen Reactions    Bee Venom Protein (Honey Bee) Unknown    Poison Ivy Extract Unknown       Study Regimen and Dosing   A3-45 Trial-The study is a 216-week treatment, multicenter, double-blind, placebo-controlled, parallel-treatment arm study in subjects with preclinical AD and elevated amyloid (A45 Trial) and subjects with early preclinical AD and intermediate amyloid beta (A?i) (A3 Trial)   A-45 Trial:  [id number] placebo:  5 mg/kg IV Q2W through 8 weeks (titration), then 10 mg/kg IV Q2W through 96 weeks (induction), then 10 mg/kg IV Q4W through 216 weeks (maintenance).       Admission and Prior to Starting Study Activities     Obtain weight in kilograms - with shoes off & heavy items removed.  Verify with coordinator there is not a 10% change in weight from previous visits weight.   Confirm Physical & Neurological Exam completed by PI.   Study Questionnaires - Coordinator will obtain prior to dosing.  Insert one peripheral IV line for sample collection procedures (flush line with 5 - 10 mL normal saline following each blood draw).  Access mediport (if available) otherwise insert second peripheral line in opposite arm for Investigative drug administration " (if peripheral line, flush line with 5 - 10 mL normal saline before & after infusion)Exceptions:      Criteria to Treat   DCRU RN reviewed and meets eligibility to proceed with treatment plan   Time team notified: *** {AM/PM:55908}  DCRU RN notifies study team to review eligibility and approval before dosing procedures  Time team approves: *** {AM/PM:31513}     Dietary Guidelines   Regular Diet        Objective   Vital Signs:   There were no vitals filed for this visit.    ASSESSMENT and PLAN:  Problem List Items Addressed This Visit    None      Medications as of the completion of today's visit:      Orders placed during today's visit:  No orders of the defined types were placed in this encounter.      [id number] - AHEAD - A3 A45    This study has not been configured for documentation in the Research Adverse Events activity.      Study Specific Instructions and Documentation           Enter a snapshot of performable actions in the order performed:  Premedication  Pre-Dose Vitals- use same arm for BP for all visits  Study drug infusion  30 min post infusion observation   Post-Dose Vital Signs- same arm for all visits- EOI  Post-Dose Vital Signs- same arm for all visits- just prior to discharge     Weight-Based Dosing     Sponsor allows participant weight taken from previous VISIT to calculate dose unless there is a noted weight difference of 10% weight gain or loss      Reference weight (kg) provided by coordinator ***  Date measured ***  Actual weight There were no vitals filed for this visit.  Date Measured 12/04/2023       Safety Parameters and Special Instructions   [id number] is a monoclonal antibody.   Amyloid is a protein that builds up in the brains of people who have Alzheimer's disease (AD). This build-up of amyloid protein in the brain is called “amyloid plaque” and may lead to impairment in memory and thinking.  [id number] binds to amyloid in the brain and has been shown to reduce the amount of this abnormal  protein.   Side effects:   Infusion reactions (Flushing, skin rash, fever, chills, general body aches, feeling shaky, swelling tissues, muscle constrictions, shortness of breath), abnormally low blood pressure,  headache, vasogenic edema (build-up of fluid in the brain most often temporary), cerebral hemorrhages, diarrhea, nausea, and cough.        PRE-DOSE Medications   Document medication administration in MAR activity.   As ordered  Administer within 30 min prior to dosing       PRE-DOSE Vital Signs   Prior to dosing document following Vitals.  Semi-supine x 3 minutes before obtaining.   BP same arm for all visits  -see coordinator note for which arm to use  HR     Temperature     Respirations     Time obtained: *** BP: ***   (location ***) Temp: ***   (type) *** Pulse: *** Respirations: ***     Infusion-Related Reactions   Review Safety Parameters on the medication Order. Follow  Hypersensitivity Orders.       A-3 Dose Level ([place] appropriate dose level)   Medication Name   Titration:  [id number] placebo 5 mg/kg IV Q4W through 8 weeks (Visits 6-7)   Maintenance:   [id number] 10mg/kg or placebo IV every 4 weeks through 216 weeks (Visits 8-60)     Research Drug Administration   Document medication administration in MAR activity. Research specific instructions below.  IRT Blinded Treatment Vial Number(s) _________________________________will be provided by study team.  Administer [id number] (5mg/kg) or placebo 5 mg x ______ kg = _________ mg IV in 250 mL NaCl 0.9%(Final Volume).     Use 0.2 micron in-line filter.    Administer dose thru NON-DEHP Pathway (non-DEHP Tubing and Bag).  IDS (Investigational Drug Services) will prime filter & tubing with active drug. Infuse over approximately 60 minutes   BUD (Beyond Use Date): 24 hours at room temperature.  As IV bag empties, flush line using 50 mL NaCl 0.9% in PhaSeal® syringe to ensure entire dose is given.    Infusion Reactions:  Use  orders. If the infusion reaction  improves or resolves, infusion may be resumed if the investigator considers it safe to do so in his/her clinical judgment. If so, then resume the infusion at 50% of the prior rate once the infusion reaction has resolved; the infusion duration should not exceed 2 hours.    Observations time may be discharged 30 min after the end of the infusion if judged medically stable by the investigator.  Covering MD must approve discharge.     Start and End of Infusion times   Start of Infusion:  *** End of Infusion:  ***     POST-DOSE Vital Signs x 2   Document following Vitals at the time points listed below.    Semi-supine x 3 minutes before obtaining.   BP same arm for all visits  -see coordinator note for which arm to use  HR     Temperature     Respirations       EOI Time obtained: *** BP: ***   (location ***) Temp: ***   (type) *** Pulse: *** Respirations: ***   At Least +2 Hours EOI Time obtained: *** BP: ***   (location ***) Temp: ***   (type) *** Pulse: *** Respirations: ***     POST-DOSE Observation   Post Dose Observation End Time ***     Discharge Instructions   Patient may be discharged to home after observations is complete and vital signs stable.   Remind patient to return for next study visit      Daniela Santos RN  12/04/23

## 2023-12-04 NOTE — RESEARCH NOTES
"[facility] RESEARCH CLINICAL VISIT NOTE  Study Name: AHEAD 3-45 [id number]  IRB#: [id number]  [facility]#: [id number]  Protocol Version Dated: V5 08-APR-2022  PI: Joseph    Time point: A3 Visit 12 Week 24, A3 Visit 18 Week 48, A3 Visit 36 Week 120, A3 Visit 48 Week 168, A3 Visit 30 Week 96, A3 Visit 60 Week 216    Encounter Date: 12/04/2023  Encounter Time:  9:00 AM EST  Encounter Department: [facility] HEMATOLOGY AND ONCOLOGY     #1    Phone Pager   Laura Adam       #2 Phone Pager   Christin Carli     Other Phone Pager   Mily Salmeron       Visit Provider: [address] [place] [address] ROOM 6 [facility]   Wiliam Keenan \"Bill\" is a 73 y.o. male and is here for a Research clinical visit.    Allergies:   Allergies   Allergen Reactions    Bee Venom Protein (Honey Bee) Unknown    Poison Ivy Extract Unknown       Study Regimen and Dosing   A3-45 Trial-The study is a 216-week treatment, multicenter, double-blind, placebo-controlled, parallel-treatment arm study in subjects with preclinical AD and elevated amyloid (A45 Trial) and subjects with early preclinical AD and intermediate amyloid beta (A?i) (A3 Trial)   A-45 Trial:  [id number] placebo:  5 mg/kg IV Q2W through 8 weeks (titration), then 10 mg/kg IV Q2W through 96 weeks (induction), then 10 mg/kg IV Q4W through 216 weeks (maintenance).       Admission and Prior to Starting Study Activities     Obtain weight in kilograms - with shoes off & heavy items removed.  Verify with coordinator there is not a 10% change in weight from previous visits weight.   Confirm Physical & Neurological Exam completed by PI.   Study Questionnaires - Coordinator will obtain prior to dosing.  Insert one peripheral IV line for sample collection procedures (flush line with 5 - 10 mL normal saline following each blood draw).  Access mediport (if available) otherwise insert second peripheral line in opposite arm for Investigative drug administration (if " peripheral line, flush line with 5 - 10 mL normal saline before & after infusion)Exceptions:      Criteria to Treat   DCRU RN reviewed and meets eligibility to proceed with treatment plan   Time team notified: 1130 am  DCRU RN notifies study team to review eligibility and approval before dosing procedures  Time team approves: 1130 am     Dietary Guidelines   Regular Diet        Objective   Vital Signs:   Patient Vitals for the past 24 hrs:   BP Temp Temp src Pulse Resp SpO2 Weight   12/04/23 1337 133/78 36.8 °C (98.2 °F) Temporal 54 18 97 % --   12/04/23 1310 129/73 36.9 °C (98.4 °F) Temporal 56 18 97 % --   12/04/23 1200 124/62 36.2 °C (97.2 °F) Temporal 55 16 97 % --   12/04/23 0903 106/63 36.9 °C (98.4 °F) Temporal 59 18 96 % 98 kg (216 lb 0.8 oz)          ASSESSMENT and PLAN:  Problem List Items Addressed This Visit    None      Medications as of the completion of today's visit:      Orders placed during today's visit:  No orders of the defined types were placed in this encounter.      No study found    Study Specific Instructions and Documentation   If available enter a snapshot of performable actions in the order performed:  Pre-Dose urine collection and POCT pregnancy test @ 1201  Premedication NA  Pre-Dose ECG- MD must read and sign prior to dosing @ 1039  Pre-Dose Vitals- use same arm for BP for all visits - See tabular view of all VS  Pre-Dose Research Labs - @1205 from 22G IV Left AC  Study drug infusion - SIC=0432  YWB=6925  30 min post infusion observation  [id number]  Post-Dose Vital Signs- same arm for all visits- EOI  See tabular view of all VS  VISIT 12 Week 24 ONLY Post-Dose Research Labs- Any time post EOI NA  Post-Dose Vital Signs- same arm for all visits- just prior to discharge See tabular view of all VS     Weight-Based Dosing     Sponsor allows participant weight taken from previous VISIT to calculate dose unless there is a noted weight difference of 10% weight gain or loss    Reference weight  (kg) provided by coordinator    Date measured    Actual weight There were no vitals filed for this visit.  Date Measured 12/04/2023       Safety Parameters and Special Instructions   [id number] is a monoclonal antibody.   Amyloid is a protein that builds up in the brains of people who have Alzheimer's disease (AD). This build-up of amyloid protein in the brain is called “amyloid plaque” and may lead to impairment in memory and thinking.  [id number] binds to amyloid in the brain and has been shown to reduce the amount of this abnormal protein.   Side effects:   Infusion reactions (Flushing, skin rash, fever, chills, general body aches, feeling shaky, swelling tissues, muscle constrictions, shortness of breath), abnormally low blood pressure,  headache, vasogenic edema (build-up of fluid in the brain most often temporary), cerebral hemorrhages, diarrhea, nausea, and cough.        PRE-DOSE Urine Collection     Test Volume Tube Handling Collection Time   Research Urinalysis [facility]   POCT Pregnancy Test If positive, no testing, call coordinator   POCT Pregnancy Results Control NA Result NA Lot# NA Exp. Date NA     PRE-DOSE Medications   Document medication administration in MAR activity.   As ordered - NA  Administer within 30 min prior to dosing       PRE-DOSE ECG   Before Research Labs   Prior to dosing  Single, 12 leadHow do I copy a table from word?  Time supine at least 5 min prior  Study Specific ECG Machine  MD/Provider must review & sign before dosing   QTcF calculation using Fridericia's formula  If QTcF is greater than 450ms, obtain 2 additional ECG's with QtcF Calculation and notify covering MD and    ECG #1 QTcF ECG #2 QTcF ECG #3 QTcF   472-Please note that the ECG with QTcF under 450 was accidentally deleted by Laura, but all except this one were under 450. Triplicate ECGs done initially b/c couldn't see the order in EPIC for ECGs and wasn't sure if single   NA  NA   Time Point   Time Completed   Pre Dose ECG 1039   ECG 2- if QTcF is greater than 450ms NA   ECG 3- if QTcF is greater than 450ms NA     PRE-DOSE Vital Signs   Prior to dosing document following Vitals.  Semi-supine x 3 minutes before obtaining.   BP same arm for all visits  -see coordinator note for which arm to use  HR     Temperature     Respirations     Time obtained: 1200 BP: 124/62   (location left arm) Temp: 36.2   (type) temporal Pulse: 55 Respirations: 16     PRE-DOSE Research Correlatives:     Refer orders placed by   Time point Specimen Test Volume Tube Handling Draw Time          All visits             Chemistry 4 mL Red Top Serum Ambient, Invert 5X       1205 from Left AC 22G IV    Hematology 2 mL EDTA Ambient, Invert 10X     PD Biomarkers 2 x 10 mL EDTA Ambient, Invert 10X    V12 Wk 24, V18 Wk 48, V30 Wk 96 only PK,ADA 10 mL Red Top Serum Ambient, Invert 5X    V36 Wk 120, V48 Wk 168, V60 Wk 216 only ADA 4 mL Red Top Serum Ambient, Invert 5X         Infusion-Related Reactions   Review Safety Parameters on the medication Order. Follow [facility] Hypersensitivity Orders.       A3 Dose Level     Medication Name   Titration: [id number] placebo 5 mg/kg IV Q4W through 8 weeks (Visits 6-7)   Maintenance: [id number] 10mg/kg or placebo IV every 4 weeks through 216 weeks (Visits 8-60)     Research Drug Administration   Document medication administration in MAR activity. Research specific instructions below.  IRT Blinded Treatment Vial Number(s) _________________________________will be provided by study team.  Administer [id number] (5mg/kg) or placebo 5 mg x ______ kg = _________ mg IV in 250 mL NaCl 0.9%(Final Volume).     Use 0.2 micron in-line filter.    Administer dose thru NON-DEHP Pathway (non-DEHP Tubing and Bag).  IDS (Investigational Drug Services) will prime filter & tubing with active drug. Infuse over approximately 60 minutes   BUD (Beyond Use Date): 24 hours at room temperature.  As IV bag  empties, flush line using 50 mL NaCl 0.9% in PhaSeal® syringe to ensure entire dose is given.    Infusion Reactions:  Use [facility] orders. If the infusion reaction improves or resolves, infusion may be resumed if the investigator considers it safe to do so in his/her clinical judgment. If so, then resume the infusion at 50% of the prior rate once the infusion reaction has resolved; the infusion duration should not exceed 2 hours.    Observations time may be discharged 30 min after the end of the infusion if judged medically stable by the investigator.  Covering MD must approve discharge.     Start and End of Infusion times   Start of Infusion:  1207 End of Infusion:  1307     POST-DOSE Vital Signs x 2   Document following Vitals at the time points listed below.    Semi-supine x 3 minutes before obtaining.   BP same arm for all visits  -see coordinator note for which arm to use  HR     Temperature     Respirations       EOI Time obtained: 1310 BP: 129/73   (location Left Arm) Temp: 36.9   (type) Temporal Pulse: 56 Respirations: 18   At Least +2 Hours EOI Time obtained: NA BP:     (location  ) Temp:     (type)   Pulse:   Respirations: NA     POST-DOSE Observation   Post Dose Observation End Time 1337     POST-DOSE Research Correlatives: VISIT 12 Week 24 ONLY     Refer orders placed by    Time point Specimen Test Volume Volume Handling Draw Time   Anytime Post EOI  VISIT 12 Week 24 ONLY PK 6 mL Red Top Serum Ambient, Invert 5X NA     Discharge Instructions   Patient may be discharged to home after observations is complete and vital signs stable.   Remind patient to return for next study visit      Daniela Santos RN  12/04/23

## 2023-12-05 ENCOUNTER — OFFICE VISIT (OUTPATIENT)
Dept: ORTHOPEDIC SURGERY | Facility: CLINIC | Age: 73
End: 2023-12-05
Payer: MEDICARE

## 2023-12-05 DIAGNOSIS — Z09 SURGICAL FOLLOW-UP CARE: Primary | ICD-10-CM

## 2023-12-05 PROCEDURE — 99213 OFFICE O/P EST LOW 20 MIN: CPT | Performed by: ORTHOPAEDIC SURGERY

## 2023-12-05 PROCEDURE — 1160F RVW MEDS BY RX/DR IN RCRD: CPT | Performed by: ORTHOPAEDIC SURGERY

## 2023-12-05 PROCEDURE — 1126F AMNT PAIN NOTED NONE PRSNT: CPT | Performed by: ORTHOPAEDIC SURGERY

## 2023-12-05 PROCEDURE — 1036F TOBACCO NON-USER: CPT | Performed by: ORTHOPAEDIC SURGERY

## 2023-12-05 PROCEDURE — 1159F MED LIST DOCD IN RCRD: CPT | Performed by: ORTHOPAEDIC SURGERY

## 2023-12-05 ASSESSMENT — PAIN - FUNCTIONAL ASSESSMENT: PAIN_FUNCTIONAL_ASSESSMENT: NO/DENIES PAIN

## 2023-12-05 NOTE — PROGRESS NOTES
History of Present Illness  Chief Complaint   Patient presents with    Left Knee - Follow-up     Lt Quad Tendon Repair 7/17/23       Patient returns today for evaluation Status Post left quadriceps tendon repair from 7/17/2023 the patient notes improvement in pain.  The patient denies any significant numbness or tingling of calf pain.  He is doing very well in his recovery.  He states he is at the point where he feels he does not need to protect it.  He does state he knows he has some work to do on strengthening.  But he has already gone hiking in [place] and played a round of pickleball     Exam  side: left Lower Extremity :  There is quadriceps atrophy on the left  Incision healing nicely, no erythema or drainage  Intact flexion and extension of digits  Range of motion 3 to 120 degrees  Neurovascular exam normal distally  2+ dorsalis pedis pulse and good cap refill     Radiographs  None today     Assessment  Patient status post left quadriceps tendon repair     Plan  Immobilization: None  Weight bearing: WBAT (Weight Bearing as Tolerated)  Continue working on strengthening exercises  Follow up 2 months for a final check, sooner if there is any problem  Questions answered

## 2023-12-06 ENCOUNTER — TREATMENT (OUTPATIENT)
Dept: PHYSICAL THERAPY | Facility: CLINIC | Age: 73
End: 2023-12-06
Payer: MEDICARE

## 2023-12-06 DIAGNOSIS — S76.119D QUADRICEPS TENDON RUPTURE, UNSPECIFIED LATERALITY, SUBSEQUENT ENCOUNTER: Primary | ICD-10-CM

## 2023-12-06 PROCEDURE — 97110 THERAPEUTIC EXERCISES: CPT | Mod: GP,KX | Performed by: PHYSICAL THERAPIST

## 2023-12-06 ASSESSMENT — PAIN - FUNCTIONAL ASSESSMENT: PAIN_FUNCTIONAL_ASSESSMENT: 0-10

## 2023-12-06 ASSESSMENT — ENCOUNTER SYMPTOMS
LOSS OF SENSATION IN FEET: 0
OCCASIONAL FEELINGS OF UNSTEADINESS: 0
DEPRESSION: 0

## 2023-12-06 ASSESSMENT — PAIN SCALES - GENERAL: PAINLEVEL_OUTOF10: 0 - NO PAIN

## 2023-12-06 NOTE — PROGRESS NOTES
Physical Therapy    Physical Therapy Discharge    Patient Name: Wiliam Keenan  MRN: [id number]  Today's Date: 12/6/2023  Time Calculation  Start Time: 0845  Stop Time: 0928  Time Calculation (min): 43 min  Insurance reviewed   Visit number: 17   Approved number of visits: 18   Medicare  100% UCR  unlimited V/Y; KX p 20th   Onset Date: 07/ 17/ 2023       Assessment:  Patient has completed 17 therapy visits up to this point, and have met 10/10 established therapy goals. The patient has progressed well, and has shown improvements in pain intensity, strength, flexibility, and mobility. At this time, the patient remains below functional baseline with mild weakness on the left lower extremity compared to right, decreased muscular endurance in the left lower extremity, and intermittent pain in the left knee. PT anticipates additional progression in these areas with continuation of HEP. Patient is discharged from formal physical therapy at this time, with instructions to continue with HEP, and follow up with physician should their status change.  PT Assessment  Assessment Comment: Discharge to HEP    Plan:  OP PT Plan  PT Plan: No Additional PT interventions required at this time  Plan of Care Agreement: Patient  Continue to progress knee flexion AROM, and strength/stability of the quadriceps  Current Problem  1. Quadriceps tendon rupture, unspecified laterality, subsequent encounter                Subjective   Patient reports that his knee continues to feel good. He has been stretching it out, and feels that this week is the first week he was able to do his flexion stretches without pain, only stretching sensation. He saw the surgeon earlier this week who was happy with his progress thus far. Pain is a 0/10 at rest, and has not exceeded 2/10 in the last 1-2 weeks.  Precautions  Precautions  STEADI Fall Risk Score (The score of 4 or more indicates an increased risk of falling): 3    Pain  Pain Assessment: 0-10  Pain Score: 0  "- No pain    Objective   Knee AROM (*indicates pain): R from IE   Flexion R 128, L 120  Extension R 0, L 0    Knee PROM (*indicates pain):   Flexion R NT, L 123  Extension R NT, L 1    LE strength (*indicates pain): R from IE  Knee flexion R 5/5, L 4+/5  Knee extension R 5/5, L 4/5  Hip flexion R 4+/5, L 4+/5  Hip abd R 4+/5, L 4+/5    Hamstring strength:  R 49.2#  L 45.6#  =92.6%  Quad strength:  R 63.8#  L 60.4#  =94.6%    Lunges: patient is able to complete 15 fwd and lateral lunges without pain, good mechanics of the hip and knee are found  Gait: pt demonstrates good gait mechanics without deviation outside of the brace    LEFS: 69/80    Treatments:  Therapeutic exercises:  Obj measures and goals review   Rocking on bike SH 16.5 L0 x6'   heel slide with strap x15  Knee flexion stretch at step x10  Standing hip extension/abd x15 ea NT  Knee extension 0-90 4# 3x10 NT  HSC GTB 2x10 NT  weight shifts with brace unlocked 15x3\" NT  Mini squats FT/FA x10 ea NT  calf raises x15 NT  Fwd/bwd/lat lunges x15 ea   TKE BTB x30 NT  Ball on wall squats 3x10 NT  3 way heel taps x12 ea NT  SLS 15x5\" ea NT  SLS 3 way rebounder x12 ea   Resisted walking 4 way x6 ea (heavy) NT  Leg press 5 1/2 pl 2x10  (39')      Goals:  By discharge:  1. Patient will report and demonstrate independence with current HEP ONGOING  2. Patient will demonstrate AROM of the L knee 0-115 to improve their ability to ambulate, negotiate stairs, and squat without restriction MET  3. Patient will demonstrate the ability to ascend/descend one flight of stairs reciprocally and without an increase in pain to improve function within the home and the community MET 10/31/23  4. Patient will demonstrate gross hip and knee strength >/= 4+/5 to improve the ability to ambulate, squat, and lift without restrictions PROGRESSING   5. Patient will demonstrate an increase in Lower Extremity Functional Scale score by >/= 37 points to 55/80 to meet established MCID (baseline " 8/3/23/: 18/80) MET  6. Patient will demonstrate the ability to ambulate >/= 150' outside of the brace, independently, and without major deviations in gait MET  7. Patient will demonstrate the ability to perform 20 consecutive SLR without obvious quadriceps lag to indicate improving quadriceps activation MET 10/31/23  8. Patient will report pain of 0/10 at rest, and no greater than 2/10 with any activity including walking, stairs, squatting, and lunging MET  9. Patient will demonstrate the ability to perform 15 forward and lateral lunges without pain in the left knee exceeding 2/10 MET  Added 9/26/23:  10. Patient will demonstrate quadriceps and hamstring activation on the left >/= 80% of the contralateral side to improve his ability to ambulate, stand, negotiate stairs, and squat MET

## 2023-12-13 DIAGNOSIS — T78.40XA CURRENT HYPERSENSITIVITY REACTION, INITIAL ENCOUNTER: ICD-10-CM

## 2023-12-13 DIAGNOSIS — Z00.6 CLINICAL TRIAL PARTICIPANT: Primary | ICD-10-CM

## 2023-12-13 RX ORDER — EPINEPHRINE 0.3 MG/.3ML
0.3 INJECTION SUBCUTANEOUS AS NEEDED
OUTPATIENT
Start: 2023-12-13

## 2023-12-13 RX ORDER — ALBUTEROL SULFATE 0.83 MG/ML
3 SOLUTION RESPIRATORY (INHALATION) ONCE AS NEEDED
OUTPATIENT
Start: 2023-12-13

## 2023-12-13 RX ORDER — DIPHENHYDRAMINE HYDROCHLORIDE 50 MG/ML
50 INJECTION, SOLUTION INTRAMUSCULAR; INTRAVENOUS ONCE AS NEEDED
OUTPATIENT
Start: 2023-12-13

## 2023-12-13 NOTE — ADDENDUM NOTE
Encounter addended by: Kemi Cooper RN on: 12/12/2023 9:27 PM   Actions taken: Charge Capture section accepted

## 2023-12-20 RX ORDER — DIPHENHYDRAMINE HYDROCHLORIDE 50 MG/ML
50 INJECTION, SOLUTION INTRAMUSCULAR; INTRAVENOUS ONCE AS NEEDED
OUTPATIENT
Start: 2023-12-20

## 2023-12-20 RX ORDER — ALBUTEROL SULFATE 0.83 MG/ML
3 SOLUTION RESPIRATORY (INHALATION) ONCE AS NEEDED
OUTPATIENT
Start: 2023-12-20

## 2023-12-20 RX ORDER — EPINEPHRINE 0.3 MG/.3ML
0.3 INJECTION SUBCUTANEOUS AS NEEDED
OUTPATIENT
Start: 2023-12-20

## 2023-12-20 RX ORDER — FAMOTIDINE 10 MG/ML
20 INJECTION INTRAVENOUS ONCE AS NEEDED
OUTPATIENT
Start: 2023-12-20

## 2024-01-02 ENCOUNTER — HOSPITAL ENCOUNTER (OUTPATIENT)
Dept: RESEARCH | Facility: HOSPITAL | Age: 74
Discharge: HOME | End: 2024-01-02
Payer: MEDICARE

## 2024-01-02 VITALS
WEIGHT: 215.61 LBS | BODY MASS INDEX: 27.68 KG/M2 | RESPIRATION RATE: 18 BRPM | DIASTOLIC BLOOD PRESSURE: 77 MMHG | OXYGEN SATURATION: 98 % | SYSTOLIC BLOOD PRESSURE: 131 MMHG | TEMPERATURE: 97.7 F | HEART RATE: 63 BPM

## 2024-01-02 DIAGNOSIS — Z00.6 CLINICAL TRIAL PARTICIPANT: ICD-10-CM

## 2024-01-02 PROCEDURE — 2500000005 HC RX 250 GENERAL PHARMACY W/O HCPCS: Performed by: PSYCHIATRY & NEUROLOGY

## 2024-01-02 RX ADMIN — Medication 980 MG: at 09:33

## 2024-01-02 NOTE — RESEARCH NOTES
DCRU RESEARCH CLINICAL VISIT NOTE  Study Name: AHEAD 3-45 [id number]  IRB#: [id number]  [facility]#: [id number]  Protocol Version Dated: V5 08-APR-2022  PI: Joseph  Time point: ALL OTHER INFUSION ONLY VISITS  Encounter Date: 01/02/2024  Encounter Time:  8:00 AM EST  Encounter Department: [facility] HEMATOLOGY AND ONCOLOGY   #1    Phone Pager   Laura Adam       #2 Phone Pager   Christin Boyce     Other Phone Pager   Mily Jaron     Visit Provider: [address], [facility] ROOM 13 [facility]  [facility]   Jamar Keenan is a 73 y.o. male and is here for a Research clinical visit.  Allergies:   Allergies   Allergen Reactions    Bee Venom Protein (Honey Bee) Unknown    Poison Ivy Extract Unknown     Study Regimen and Dosing   A3-45 Trial-The study is a 216-week treatment, multicenter, double-blind, placebo-controlled, parallel-treatment arm study in subjects with preclinical AD and elevated amyloid (A45 Trial) and subjects with early preclinical AD and intermediate amyloid beta (A?i) (A3 Trial)   A-45 Trial:  [id number] placebo:  5 mg/kg IV Q2W through 8 weeks (titration), then 10 mg/kg IV Q2W through 96 weeks (induction), then 10 mg/kg IV Q4W through 216 weeks (maintenance).       Admission and Prior to Starting Study Activities   Obtain weight in kilograms - with shoes off & heavy items removed.  Verify with coordinator there is not a 10% change in weight from previous visits weight.   Insert one peripheral IV line for sample collection procedures (flush line with 5 - 10 mL normal saline following each blood draw).  Access mediport (if available) otherwise insert second peripheral line in opposite arm for Investigative drug administration (if peripheral line, flush line with 5 - 10 mL normal saline before & after infusion)Exceptions:        Criteria to Treat   DCRU RN reviewed and meets eligibility to proceed with treatment plan   Time team notified: 0815 am  DCRU RN notifies study team  to review eligibility and approval before dosing procedures  Time team approves: 0815 am     Dietary Guidelines   Regular Diet    Objective   Vital Signs:   Vitals:    01/02/24 0805 01/02/24 1035 01/02/24 1107   BP: 123/72 126/69 131/77   Pulse: 93 67 63   Resp: 17 17 18   Temp: 36.5 °C (97.7 °F) 36.8 °C (98.2 °F) 36.5 °C (97.7 °F)   TempSrc: Oral Oral Oral   SpO2: 97% 94% 98%   Weight: 97.8 kg (215 lb 9.8 oz)       Medications as of the completion of today's visit:  Administrations This Visit       Study AHEAD [id number] or placebo 980 mg in sodium chloride 0.9% 250 mL IV       Admin Date  01/02/2024 Action  New Bag Dose  980 mg Rate  250 mL/hr Route  intravenous Administered By  Antoine Hurd RN                Orders placed during today's visit:  No orders of the defined types were placed in this encounter.  [id number] - AHEAD - A3 A45    Patient has no adverse events documented in the Research Adverse Events activity.    Study Specific Instructions and Documentation   If available enter a snapshot of performable actions in the order performed:  Premedication  Pre-Dose Vitals- use same arm for BP for all visits  Study drug infusion  30 min post infusion observation   Post-Dose Vital Signs- same arm for all visits- EOI  Post-Dose Vital Signs- same arm for all visits- just prior to discharge     Weight-Based Dosing     Sponsor allows participant weight taken from previous VISIT to calculate dose unless there is a noted weight difference of 10% weight gain or loss      Reference weight (kg) provided by coordinator Not provided by coordinator  Date measured not provided by coordinator  Actual weight   Vitals:    01/02/24 0805   Weight: 97.8 kg (215 lb 9.8 oz)     Date Measured 01/02/2024     Safety Parameters and Special Instructions   [id number] is a monoclonal antibody.   Amyloid is a protein that builds up in the brains of people who have Alzheimer's disease (AD). This build-up of amyloid protein in the brain is  called “amyloid plaque” and may lead to impairment in memory and thinking.  [id number] binds to amyloid in the brain and has been shown to reduce the amount of this abnormal protein.   Side effects:   Infusion reactions (Flushing, skin rash, fever, chills, general body aches, feeling shaky, swelling tissues, muscle constrictions, shortness of breath), abnormally low blood pressure,  headache, vasogenic edema (build-up of fluid in the brain most often temporary), cerebral hemorrhages, diarrhea, nausea, and cough.        PRE-DOSE Medications   Document medication administration in MAR activity.   As ordered  Administer within 30 min prior to dosing       PRE-DOSE Vital Signs   Prior to dosing document following Vitals.  Semi-supine x 3 minutes before obtaining.   BP same arm for all visits  -see coordinator note for which arm to use  HR     Temperature     Respirations       Time Obtained: 0805 BP Arm: left     Infusion-Related Reactions   Review Safety Parameters on the medication Order. Follow  Hypersensitivity Orders.       A-45 Dose Level    Medication Name   Titration: [id number] 5mg/kg or placebo IV every 2 weeks thru 8 weeks (Visits 6-9) then   Induction: [id number] 10mg/kg or placebo IV every 2 weeks (Visits 10-53) thru 96 weeks then   Maintenance: [id number] 10mg/kg or placebo IV every 4 weeks (Visits 54-84) thru 216 weeks     Research Drug Administration   Document medication administration in MAR activity. Research specific instructions below.  IRT Blinded Treatment Vial Number(s) will be provided by study team.  Administer [id number] (5mg/kg) or placebo 5 mg x ______ kg = _________ mg IV in 250 mL NaCl 0.9%(Final Volume).     Use 0.2 micron in-line filter.    Administer dose thru NON-DEHP Pathway (non-DEHP Tubing and Bag).  IDS (Investigational Drug Services) will prime filter & tubing with active drug. Infuse over approximately 60 minutes   BUD (Beyond Use Date): 24 hours at room temperature.  As IV bag empties, flush  line using 50 mL NaCl 0.9% in PhaSeal® syringe to ensure entire dose is given.    Infusion Reactions:  Use [facility] orders. If the infusion reaction improves or resolves, infusion may be resumed if the investigator considers it safe to do so in his/her clinical judgment. If so, then resume the infusion at 50% of the prior rate once the infusion reaction has resolved; the infusion duration should not exceed 2 hours.    Observations time may be discharged 30 min after the end of the infusion if judged medically stable by the investigator.  Covering MD must approve discharge.     BAN or placebo administration   Begin Infusion 0933 RN see MAR   End Infusion  1033 RN see MAR     POST-DOSE Vital Signs x 2   Document following Vitals at the time points listed below.    Semi-supine x 3 minutes before obtaining.   BP same arm for all visits  -see coordinator note for which arm to use  HR     Temperature     Respirations       EOI Time “0” Time Obtained: 1035     Arm: left   At Least +2 Hours EOI Time Obtained: N/A patient observation ended at EOI 30 min      Arm: N/A     POST-DOSE Observation   Post Dose Observation End Time 1103     Discharge Instructions   Patient may be discharged to home after observations is complete and vital signs stable.   Remind patient to return for next study visit    Antoine Dale RN  01/02/24

## 2024-01-17 ENCOUNTER — PATIENT OUTREACH (OUTPATIENT)
Dept: PRIMARY CARE | Facility: CLINIC | Age: 74
End: 2024-01-17
Payer: MEDICARE

## 2024-01-17 NOTE — PROGRESS NOTES
Unable to reach patient for wrap up Transitional Care Management (TCM) services. LVM with patient to return call for questions or concerns. The patient has met target of no readmission for (90) days post hospital discharge and is graduated from the TCM program at this time.

## 2024-01-18 ENCOUNTER — TELEMEDICINE (OUTPATIENT)
Dept: PRIMARY CARE | Facility: CLINIC | Age: 74
End: 2024-01-18
Payer: MEDICARE

## 2024-01-18 DIAGNOSIS — J06.9 VIRAL URI WITH COUGH: Primary | ICD-10-CM

## 2024-01-18 PROCEDURE — 99213 OFFICE O/P EST LOW 20 MIN: CPT

## 2024-01-18 ASSESSMENT — ENCOUNTER SYMPTOMS
RHINORRHEA: 0
SORE THROAT: 0
ACTIVITY CHANGE: 0
CHILLS: 0
FATIGUE: 1
COUGH: 1
SHORTNESS OF BREATH: 0
PALPITATIONS: 0
CHEST TIGHTNESS: 0
FEVER: 0
APPETITE CHANGE: 0

## 2024-01-18 NOTE — PROGRESS NOTES
"Subjective   Wiliam Keenan \"Jamar\" is a 73 y.o. male who presents for Cough (Cough,congestion X 1 week).  HPI    Wiliam \"Jamar\" is here for sinus drainage, cough and congestion for the past week. He has a history of PNA with hospitalization in October 2023.   Believes the congestion is causing him to cough. He is coughing up yellow phlegm.  He is not using anything for the cough and congestion.  He does have mucinex, but has not used yet.  He has a history of rhinitis and sinus issues.  He is using flonase for his sinuses.  No fever or chills, SOB, chest pain, ear pain, sore throat.  At home O2 is 96% -98%.  No underlying lung disease.  No known sick contacts.    He is still physical active, playing pickelball. Does get some fatigue from this.    Review of Systems   Constitutional:  Positive for fatigue. Negative for activity change, appetite change, chills and fever.   HENT:  Positive for congestion and postnasal drip. Negative for ear pain, rhinorrhea and sore throat.    Respiratory:  Positive for cough. Negative for chest tightness and shortness of breath.    Cardiovascular:  Negative for chest pain, palpitations and leg swelling.     Objective     There were no vitals taken for this visit.  Physical Exam  Constitutional:       Appearance: Normal appearance. He is not ill-appearing.      Comments: Speaks in full sentences, no conversational dyspnea. Voice not congestion. No audible cough.    HENT:      Head: Normocephalic.   Neurological:      Mental Status: He is alert.   Psychiatric:         Mood and Affect: Mood normal.       Assessment/Plan   Problem List Items Addressed This Visit    None  Visit Diagnoses         Codes    Viral URI with cough    -  Primary J06.9        Patient is well appearing, non ill appearing on video. S/s most consistent with viral URI at this time. Disc continuing Flonase, nasal saline, mucinex, tylenol as needed for fever, fluid hydration. He should rest when not feeling well. Recommend " following up in clinic if developing fever, SOB or other concerning symptoms.  Patient in agreement and understanding of plan.  No prior history of prevnar 20, it has been > 5 years since last pna shot, may be beneficial with his PNA history.    Discussed with Attending,    Crystal Sparks, DO PGY-3

## 2024-01-22 NOTE — PROGRESS NOTES
I saw and evaluated the patient. I personally obtained the key and critical portions of the history and physical exam or was physically present for key and critical portions performed by the resident/fellow. I reviewed the resident/fellow's documentation and discussed the patient with the resident/fellow. I agree with the resident/fellow's medical decision making as documented in the note.    Tonny Magallon, DO

## 2024-01-25 DIAGNOSIS — Z00.6 RESEARCH SUBJECT: ICD-10-CM

## 2024-02-06 ENCOUNTER — APPOINTMENT (OUTPATIENT)
Dept: ORTHOPEDIC SURGERY | Facility: CLINIC | Age: 74
End: 2024-02-06
Payer: MEDICARE

## 2024-02-08 DIAGNOSIS — T78.40XA CURRENT HYPERSENSITIVITY REACTION, INITIAL ENCOUNTER: ICD-10-CM

## 2024-02-08 DIAGNOSIS — Z00.6 CLINICAL TRIAL PARTICIPANT: Primary | ICD-10-CM

## 2024-02-09 RX ORDER — EPINEPHRINE 0.3 MG/.3ML
0.3 INJECTION SUBCUTANEOUS AS NEEDED
Status: CANCELLED | OUTPATIENT
Start: 2024-02-09

## 2024-02-09 RX ORDER — ALBUTEROL SULFATE 0.83 MG/ML
3 SOLUTION RESPIRATORY (INHALATION) ONCE AS NEEDED
Status: CANCELLED | OUTPATIENT
Start: 2024-02-09

## 2024-02-09 RX ORDER — FAMOTIDINE 10 MG/ML
20 INJECTION INTRAVENOUS ONCE AS NEEDED
Status: CANCELLED | OUTPATIENT
Start: 2024-02-09

## 2024-02-09 RX ORDER — DIPHENHYDRAMINE HYDROCHLORIDE 50 MG/ML
50 INJECTION, SOLUTION INTRAMUSCULAR; INTRAVENOUS ONCE AS NEEDED
Status: CANCELLED | OUTPATIENT
Start: 2024-02-09

## 2024-02-14 ENCOUNTER — TELEPHONE (OUTPATIENT)
Dept: BEHAVIORAL HEALTH | Facility: CLINIC | Age: 74
End: 2024-02-14

## 2024-02-22 DIAGNOSIS — Z00.6 CLINICAL TRIAL PARTICIPANT: Primary | ICD-10-CM

## 2024-02-22 NOTE — PROGRESS NOTES
History of Present Illness   Patient is here for his left quadriceps tendon repair.  He is doing well.  He has no pain.  He has resumed playing Digital Trowel ball and exercising.     Review of Systems   GENERAL: Negative for malaise, significant weight loss, fever  MUSCULOSKELETAL: see HPI  NEURO:  Negative     Physical Exam  This is a male in no acute distress  Left knee:  The incision is healed, there is no effusion, erythema or warmth.  Range of motion: 0-135 degrees  There is some quad atrophy on the left as compared to the right     Imaging  None today     Assessment   Status post left quadriceps tendon repair     Plan  Continue activities as tolerated  Follow-up as needed  All questions were answered

## 2024-02-23 ENCOUNTER — OFFICE VISIT (OUTPATIENT)
Dept: ORTHOPEDIC SURGERY | Facility: CLINIC | Age: 74
End: 2024-02-23
Payer: MEDICARE

## 2024-02-23 DIAGNOSIS — S76.119D QUADRICEPS TENDON RUPTURE, UNSPECIFIED LATERALITY, SUBSEQUENT ENCOUNTER: Primary | ICD-10-CM

## 2024-02-23 PROCEDURE — 1126F AMNT PAIN NOTED NONE PRSNT: CPT | Performed by: ORTHOPAEDIC SURGERY

## 2024-02-23 PROCEDURE — 99212 OFFICE O/P EST SF 10 MIN: CPT | Performed by: ORTHOPAEDIC SURGERY

## 2024-02-23 PROCEDURE — 1036F TOBACCO NON-USER: CPT | Performed by: ORTHOPAEDIC SURGERY

## 2024-02-23 PROCEDURE — 1159F MED LIST DOCD IN RCRD: CPT | Performed by: ORTHOPAEDIC SURGERY

## 2024-03-01 DIAGNOSIS — Z00.6 RESEARCH STUDY PATIENT: Primary | ICD-10-CM

## 2024-03-01 DIAGNOSIS — Z00.6 CLINICAL TRIAL PARTICIPANT: ICD-10-CM

## 2024-03-04 ENCOUNTER — HOSPITAL ENCOUNTER (OUTPATIENT)
Dept: RESEARCH | Facility: HOSPITAL | Age: 74
Discharge: HOME | End: 2024-03-04
Payer: MEDICARE

## 2024-03-04 VITALS
SYSTOLIC BLOOD PRESSURE: 137 MMHG | BODY MASS INDEX: 28.02 KG/M2 | TEMPERATURE: 97.5 F | OXYGEN SATURATION: 97 % | WEIGHT: 218.26 LBS | HEART RATE: 57 BPM | DIASTOLIC BLOOD PRESSURE: 80 MMHG | RESPIRATION RATE: 18 BRPM

## 2024-03-04 DIAGNOSIS — Z00.6 CLINICAL TRIAL PARTICIPANT: ICD-10-CM

## 2024-03-04 DIAGNOSIS — T78.40XA CURRENT HYPERSENSITIVITY REACTION, INITIAL ENCOUNTER: ICD-10-CM

## 2024-03-04 PROCEDURE — 2500000005 HC RX 250 GENERAL PHARMACY W/O HCPCS: Performed by: PSYCHIATRY & NEUROLOGY

## 2024-03-04 RX ORDER — FAMOTIDINE 10 MG/ML
20 INJECTION INTRAVENOUS ONCE AS NEEDED
Status: DISCONTINUED | OUTPATIENT
Start: 2024-03-04 | End: 2024-03-05 | Stop reason: HOSPADM

## 2024-03-04 RX ORDER — ALBUTEROL SULFATE 0.83 MG/ML
3 SOLUTION RESPIRATORY (INHALATION) ONCE AS NEEDED
Status: DISCONTINUED | OUTPATIENT
Start: 2024-03-04 | End: 2024-03-05 | Stop reason: HOSPADM

## 2024-03-04 RX ORDER — DIPHENHYDRAMINE HYDROCHLORIDE 50 MG/ML
50 INJECTION, SOLUTION INTRAMUSCULAR; INTRAVENOUS ONCE AS NEEDED
Status: DISCONTINUED | OUTPATIENT
Start: 2024-03-04 | End: 2024-03-05 | Stop reason: HOSPADM

## 2024-03-04 RX ORDER — EPINEPHRINE 0.3 MG/.3ML
0.3 INJECTION SUBCUTANEOUS AS NEEDED
Status: DISCONTINUED | OUTPATIENT
Start: 2024-03-04 | End: 2024-03-05 | Stop reason: HOSPADM

## 2024-03-04 RX ADMIN — Medication 978 MG: at 11:05

## 2024-03-04 NOTE — RESEARCH NOTES
"DCRU RESEARCH CLINICAL VISIT NOTE  Study Name: AHEAD 3-45 [id number]  IRB#: [id number]  [facility]#: [id number]  Protocol Version Dated: V5 08-APR-2022  PI: Joseph  Time point: A45 Visit 57 Week 108, A45 Visit 63 Week 132, A45 Visit 69 Week 156, A45 Visit 75 Week 180, A45 Visit 81 Week 204, ALL OTHER INFUSION ONLY VISITS    Encounter Date: 03/04/2024  Encounter Time:  9:00 AM EST  Encounter Department: [facility] HEMATOLOGY AND ONCOLOGY     #1    Phone Pager   Laura Adam       #2 Phone Pager   Christin Carli     Other Phone Pager   Mily Salmeron       Visit Provider: [id number], [facility] ROOM 13 [facility]   Wiliam Keenan \"Bill\" is a 73 y.o. male and is here for a Research clinical visit.    Allergies:   Allergies   Allergen Reactions    Bee Venom Protein (Honey Bee) Unknown    Poison Ivy Extract Unknown       Study Regimen and Dosing   A3-45 Trial-The study is a 216-week treatment, multicenter, double-blind, placebo-controlled, parallel-treatment arm study in subjects with preclinical AD and elevated amyloid (A45 Trial) and subjects with early preclinical AD and intermediate amyloid beta (A?i) (A3 Trial)   A-45 Trial:  [id number] placebo:  5 mg/kg IV Q2W through 8 weeks (titration), then 10 mg/kg IV Q2W through 96 weeks (induction), then 10 mg/kg IV Q4W through 216 weeks (maintenance).       Admission and Prior to Starting Study Activities   Obtain weight in kilograms - with shoes off & heavy items removed.  Verify with coordinator there is not a 10% change in weight from previous visits weight.   Confirm Physical & Neurological Exam completed by PI.   Study Questionnaires - Coordinator will obtain prior to dosing.  Insert one peripheral IV line for sample collection procedures (flush line with 5 - 10 mL normal saline following each blood draw).  Access mediport (if available) otherwise insert second peripheral line in opposite arm for Investigative drug " administration (if peripheral line, flush line with 5 - 10 mL normal saline before & after infusion)Exceptions:        Criteria to Treat   DCRU RN reviewed and meets eligibility to proceed with treatment plan   Time team notified: 1030 am  DCRU RN notifies study team to review eligibility and approval before dosing procedures  Time team approves: 1030 am     Dietary Guidelines   Regular Diet        Objective   Vital Signs:   There were no vitals filed for this visit.    Medications as of the completion of today's visit:      Orders placed during today's visit:  No orders of the defined types were placed in this encounter.      [id number] - AHEAD - A3 A45    Patient has no adverse events documented in the Research Adverse Events activity.      Study Specific Instructions and Documentation   Premedication  Pre-Dose Vitals- use same arm for BP for all visits  Study drug infusion  30 min post infusion observation   Post-Dose Vital Signs- same arm for all visits- EOI  Post-Dose Vital Signs- same arm for all visits- just prior to discharge     Weight-Based Dosing     Sponsor allows participant weight taken from previous VISIT to calculate dose unless there is a noted weight difference of 10% weight gain or loss      Reference weight (kg) provided by coordinator   Date measured 1052  Actual weight There were no vitals filed for this visit.  Date Measured 03/04/2024       Safety Parameters and Special Instructions   [id number] is a monoclonal antibody.   Amyloid is a protein that builds up in the brains of people who have Alzheimer's disease (AD). This build-up of amyloid protein in the brain is called “amyloid plaque” and may lead to impairment in memory and thinking.  [id number] binds to amyloid in the brain and has been shown to reduce the amount of this abnormal protein.   Side effects:   Infusion reactions (Flushing, skin rash, fever, chills, general body aches, feeling shaky, swelling tissues, muscle constrictions, shortness  of breath), abnormally low blood pressure,  headache, vasogenic edema (build-up of fluid in the brain most often temporary), cerebral hemorrhages, diarrhea, nausea, and cough.        PRE-DOSE Medications   Document medication administration in MAR activity.   As ordered  Administer within 30 min prior to dosing       PRE-DOSE Vital Signs   Prior to dosing document following Vitals.  Semi-supine x 3 minutes before obtaining.   BP same arm for all visits  -see coordinator note for which arm to use  HR     Temperature     Respirations       Time Obtained: 1052 BP Arm: LEFT ARM     Infusion-Related Reactions   Review Safety Parameters on the medication Order. Follow  Hypersensitivity Orders.       A-45 Dose Level    Medication Name   Titration: [id number] 5mg/kg or placebo IV every 2 weeks thru 8 weeks (Visits 6-9) then   Induction: [id number] 10mg/kg or placebo IV every 2 weeks (Visits 10-53) thru 96 weeks then   Maintenance: [id number] 10mg/kg or placebo IV every 4 weeks (Visits 54-84) thru 216 weeks     Research Drug Administration   Document medication administration in MAR activity. Research specific instructions below.  IRT Blinded Treatment Vial Number(s) _________________________________will be provided by study team.  Administer [id number] (5mg/kg) or placebo 5 mg x ______ kg = _________ mg IV in 250 mL NaCl 0.9%(Final Volume).     Use 0.2 micron in-line filter.    Administer dose thru NON-DEHP Pathway (non-DEHP Tubing and Bag).  IDS (Investigational Drug Services) will prime filter & tubing with active drug. Infuse over approximately 60 minutes   BUD (Beyond Use Date): 24 hours at room temperature.  As IV bag empties, flush line using 50 mL NaCl 0.9% in PhaSeal® syringe to ensure entire dose is given.    Infusion Reactions:  Use  orders. If the infusion reaction improves or resolves, infusion may be resumed if the investigator considers it safe to do so in his/her clinical judgment. If so, then resume the infusion at  50% of the prior rate once the infusion reaction has resolved; the infusion duration should not exceed 2 hours.    Observations time may be discharged 30 min after the end of the infusion if judged medically stable by the investigator.  Covering MD must approve discharge.     BAN or placebo administration   Begin Infusion 1105 RN SV   End Infusion 1205 RN SV     POST-DOSE Vital Signs x 2   Document following Vitals at the time points listed below.    Semi-supine x 3 minutes before obtaining.   BP same arm for all visits  -see coordinator note for which arm to use  HR     Temperature     Respirations       EOI Time “0” Time Obtained: 1205      Arm: LEFT   At Least +2 Hours EOI -NA Time Obtained: NA      Arm: NA   +30 MIN EOI VS OBTAINED IN LEFT ARM  POST-DOSE Observation   Post Dose Observation End Time 1235     Discharge Instructions   Patient may be discharged to home after observations is complete and vital signs stable.   Remind patient to return for next study visit      Daniela Santos RN  03/04/24

## 2024-03-25 DIAGNOSIS — Z00.6 RESEARCH SUBJECT: ICD-10-CM

## 2024-03-25 DIAGNOSIS — Z00.6 RESEARCH STUDY PATIENT: Primary | ICD-10-CM

## 2024-04-19 DIAGNOSIS — Z00.6 CLINICAL TRIAL PARTICIPANT: Primary | ICD-10-CM

## 2024-04-22 ENCOUNTER — HOSPITAL ENCOUNTER (OUTPATIENT)
Dept: RESEARCH | Facility: HOSPITAL | Age: 74
Discharge: HOME | End: 2024-04-22
Payer: MEDICARE

## 2024-04-22 ENCOUNTER — HOSPITAL ENCOUNTER (OUTPATIENT)
Dept: RADIOLOGY | Facility: HOSPITAL | Age: 74
Discharge: HOME | End: 2024-04-22
Payer: MEDICARE

## 2024-04-22 VITALS
DIASTOLIC BLOOD PRESSURE: 72 MMHG | OXYGEN SATURATION: 96 % | WEIGHT: 215.61 LBS | HEART RATE: 54 BPM | SYSTOLIC BLOOD PRESSURE: 127 MMHG | TEMPERATURE: 97.9 F | RESPIRATION RATE: 18 BRPM | BODY MASS INDEX: 27.68 KG/M2

## 2024-04-22 DIAGNOSIS — Z00.6 CLINICAL TRIAL PARTICIPANT: ICD-10-CM

## 2024-04-22 DIAGNOSIS — Z00.6 RESEARCH STUDY PATIENT: ICD-10-CM

## 2024-04-22 PROCEDURE — 70551 MRI BRAIN STEM W/O DYE: CPT

## 2024-04-22 PROCEDURE — 2500000005 HC RX 250 GENERAL PHARMACY W/O HCPCS: Performed by: PSYCHIATRY & NEUROLOGY

## 2024-04-22 RX ADMIN — Medication 990 MG: at 09:13

## 2024-04-22 NOTE — RESEARCH NOTES
DCRU RESEARCH CLINICAL VISIT NOTE  Study Name: AHEAD 3-45 [id number]  IRB#: [id number]  [id number]  Protocol Version Dated: V5 08-APR-2022  PI: Kenney Shipley  Time point: ALL OTHER INFUSION ONLY VISITS    Encounter Date: 04/22/2024  Encounter Time:  8:00 AM EDT  Encounter Department: [facility] HEMATOLOGY AND ONCOLOGY     #1    Phone Secure Chat   Laura Mackeyi  [phone]     #2 Phone Secure Chat   Christin Berkowitzon [phone]    Other Phone Secure Chat          Visit Provider: [facility] ROOM 2 [facility]   Jamar Keenan is a 73 y.o. male and is here for a Research clinical visit.    Allergies:   Allergies   Allergen Reactions    Bee Venom Protein (Honey Bee) Unknown    Poison Ivy Extract Unknown       Study Regimen and Dosing   A3-45 Trial-The study is a 216-week treatment, multicenter, double-blind, placebo-controlled, parallel-treatment arm study in subjects with preclinical AD and elevated amyloid (A45 Trial) and subjects with early preclinical AD and intermediate amyloid beta (A?i) (A3 Trial)   A-45 Trial:  [id number] placebo:  5 mg/kg IV Q2W through 8 weeks (titration), then 10 mg/kg IV Q2W through 96 weeks (induction), then 10 mg/kg IV Q4W through 216 weeks (maintenance).       Admission and Prior to Starting Study Activities   Obtain weight in kilograms - with shoes off & heavy items removed.  Verify with coordinator there is not a 10% change in weight from previous visits weight.   Insert one peripheral IV line for sample collection procedures (flush line with 5 - 10 mL normal saline following each blood draw).  Access mediport (if available) otherwise insert second peripheral line in opposite arm for Investigative drug administration (if peripheral line, flush line with 5 - 10 mL normal saline before & after infusion)Exceptions:        Criteria to Treat   DCRU RN reviewed and meets eligibility to proceed with treatment plan   Time  team notified: 0819 am  DCRU RN notifies study team to review eligibility and approval before dosing procedures  Time team approves: 0819 am     Dietary Guidelines   Regular Diet      Objective   Vital Signs:   Vitals:    04/22/24 0813 04/22/24 0909 04/22/24 1014 04/22/24 1044   BP: 135/70 125/74 118/71 127/72   Pulse: 56 54 57 54   Resp: 18 18 18 18   Temp: 36.3 °C (97.3 °F) 36.6 °C (97.9 °F) 36.5 °C (97.7 °F) 36.6 °C (97.9 °F)   TempSrc: Oral Oral Oral Oral   SpO2: 96% 95% 96% 96%   Weight: 97.8 kg (215 lb 9.8 oz)          Medications as of the completion of today's visit:  Administrations This Visit       Study AHEAD [id number] or placebo 990 mg in sodium chloride 0.9% 250 mL IV       Admin Date  04/22/2024 Action  New Bag Dose  990 mg Rate  250 mL/hr Route  intravenous Documented By  Lydia Tatum RN                    Orders placed during today's visit:  Orders Placed This Encounter   Procedures    Adult diet     Order Specific Question:   Diet type     Answer:   Regular       [id number] - AHEAD - A3 A45    Patient has no adverse events documented in the Research Adverse Events activity.      Study Specific Instructions and Documentation   If available enter a snapshot of performable actions in the order performed:  Premedication  Pre-Dose Vitals- use same arm for BP for all visits  Study drug infusion  30 min post infusion observation   Post-Dose Vital Signs- same arm for all visits       Weight-Based Dosing     Sponsor allows participant weight taken from previous VISIT to calculate dose unless there is a noted weight difference of 10% weight gain or loss      Actual weight   Vitals:    04/22/24 0813   Weight: 97.8 kg (215 lb 9.8 oz)     Date Measured 04/22/2024     Safety Parameters and Special Instructions   [id number] is a monoclonal antibody.   Amyloid is a protein that builds up in the brains of people who have Alzheimer's disease (AD). This build-up of amyloid protein in the brain is called “amyloid plaque” and  may lead to impairment in memory and thinking.  [id number] binds to amyloid in the brain and has been shown to reduce the amount of this abnormal protein.   Side effects:   Infusion reactions (Flushing, skin rash, fever, chills, general body aches, feeling shaky, swelling tissues, muscle constrictions, shortness of breath), abnormally low blood pressure,  headache, vasogenic edema (build-up of fluid in the brain most often temporary), cerebral hemorrhages, diarrhea, nausea, and cough.        PRE-DOSE Medications   Document medication administration in MAR activity.   As ordered  Administer within 30 min prior to dosing       PRE-DOSE Vital Signs   Prior to dosing document following Vitals.  Semi-supine x 3 minutes before obtaining.   BP same arm for all visits  -see coordinator note for which arm to use  HR     Temperature     Respirations       Time Obtained: 0909 BP Arm: left     Infusion-Related Reactions   Review Safety Parameters on the medication Order. Follow  Hypersensitivity Orders.       A-45 Dose Level    Medication Name   Titration: [id number] 5mg/kg or placebo IV every 2 weeks thru 8 weeks (Visits 6-9) then   Induction: [id number] 10mg/kg or placebo IV every 2 weeks (Visits 10-53) thru 96 weeks then   Maintenance: [id number] 10mg/kg or placebo IV every 4 weeks (Visits 54-84) thru 216 weeks     BAN2401-Placebo Research Drug Administration   Document medication administration in MAR activity. Research specific instructions below.   Use 0.2 micron in-line filter.    Administer dose thru NON-DEHP Pathway (non-DEHP Tubing and Bag).  IDS (Investigational Drug Services) will prime filter & tubing with active drug. Infuse over approximately 60 minutes   BUD (Beyond Use Date): 24 hours at room temperature.  As IV bag empties, flush line using 50 mL NaCl 0.9% in PhaSeal® syringe to ensure entire dose is given.    Infusion Reactions:  Use  orders. If the infusion reaction improves or resolves, infusion may be resumed if  the investigator considers it safe to do so in his/her clinical judgment. If so, then resume the infusion at 50% of the prior rate once the infusion reaction has resolved; the infusion duration should not exceed 2 hours.    Observation time  30 min after the end of the infusion      BAN or placebo administration   Begin Infusion 0913 ALBINA Tatum RN   End Infusion 1013 ALBINA Tatum RN     POST-DOSE Vital Signs x 2   Document following Vitals at the time points listed below.    Semi-supine x 3 minutes before obtaining.   BP same arm for all visits  -see coordinator note for which arm to use  HR     Temperature     Respirations       EOI   Time Obtained: 1014        BP arm left   + 30 MIN EOI    Time Obtained: 1044        BP arm left     30 min POST-DOSE Observation   Post Dose Observation End Time 1043     Discharge Instructions   Patient may be discharged to home after observations is complete.   Remind patient to return for next study visit    3- nina Tatum RN  04/22/24

## 2024-05-16 DIAGNOSIS — Z00.6 CLINICAL TRIAL PARTICIPANT: Primary | ICD-10-CM

## 2024-05-20 ENCOUNTER — HOSPITAL ENCOUNTER (OUTPATIENT)
Dept: RESEARCH | Facility: HOSPITAL | Age: 74
Discharge: HOME | End: 2024-05-20
Payer: MEDICARE

## 2024-05-20 VITALS
BODY MASS INDEX: 27.43 KG/M2 | RESPIRATION RATE: 18 BRPM | WEIGHT: 213.63 LBS | HEART RATE: 57 BPM | SYSTOLIC BLOOD PRESSURE: 115 MMHG | DIASTOLIC BLOOD PRESSURE: 61 MMHG | TEMPERATURE: 97.7 F | OXYGEN SATURATION: 97 %

## 2024-05-20 DIAGNOSIS — Z00.6 CLINICAL TRIAL PARTICIPANT: ICD-10-CM

## 2024-05-20 LAB
HOLD SPECIMEN: NORMAL

## 2024-05-20 PROCEDURE — 2500000005 HC RX 250 GENERAL PHARMACY W/O HCPCS: Performed by: PSYCHIATRY & NEUROLOGY

## 2024-05-20 RX ADMIN — Medication 978 MG: at 10:52

## 2024-05-20 ASSESSMENT — PAIN SCALES - GENERAL: PAINLEVEL: 0-NO PAIN

## 2024-05-20 NOTE — RESEARCH NOTES
"DCRU RESEARCH CLINICAL VISIT NOTE  Study Name: AHEAD 3-45 [id number]  IRB#: [id number]  [facility]#: [id number]  Protocol Version Dated: V5 08-APR-2022  PI: Kenney Shipley    Time point: A45 Visit 66 Week 144     Encounter Date: 05/20/2024  Encounter Time:  9:00 AM EDT  Encounter Department: [facility] HEMATOLOGY AND ONCOLOGY     #1    Phone Secure Chat   Laura Adam       #2 Phone Secure Chat   Christin Boyce     Other Phone Secure Chat          Visit Provider: [id number] [facility] ROOM 13 [facility]    Subjective   Wiliam Keenan \"Bill\" is a 73 y.o. male and is here for a Research clinical visit.    Allergies:   Allergies   Allergen Reactions    Bee Venom Protein (Honey Bee) Unknown    Poison Ivy Extract Unknown       Study Regimen and Dosing   A3-45 Trial-The study is a 216-week treatment, multicenter, double-blind, placebo-controlled, parallel-treatment arm study in subjects with preclinical AD and elevated amyloid (A45 Trial) and subjects with early preclinical AD and intermediate amyloid beta (A?i) (A3 Trial)   A-45 Trial:  [id number] placebo:  5 mg/kg IV Q2W through 8 weeks (titration), then 10 mg/kg IV Q2W through 96 weeks (induction), then 10 mg/kg IV Q4W through 216 weeks (maintenance).       Admission and Prior to Starting Study Activities   Obtain weight in kilograms - with shoes off & heavy items removed.  Verify with coordinator there is not a 10% change in weight from previous visits weight.   Confirm Physical & Neurological Exam completed by PI.   Study Questionnaires - Coordinator will obtain prior to dosing.  Insert one peripheral IV line for sample collection procedures (flush line with 5 - 10 mL normal saline following each blood draw).  Access mediport (if available) otherwise insert second peripheral line in opposite arm for Investigative drug administration (if peripheral line, flush line with 5 - 10 mL normal saline before & after infusion)Exceptions:    "     Criteria to Treat   DCRU RN reviewed and meets eligibility to proceed with treatment plan   Time team notified: 09:36 am  DCRU RN notifies study team to review eligibility and approval before dosing procedures  Time team approves: 09:36 am     Dietary Guidelines   Regular Diet        Objective   Vital Signs:   See epic    Medications as of the completion of today's visit:  see mar in epic    Orders placed during today's visit:  No orders of the defined types were placed in this encounter.      [id number] - AHEAD - A3 A45    Patient has no adverse events documented in the Research Adverse Events activity.      Study Specific Instructions and Documentation   Pre-Dose urine collection   Premedication - none ordered  Pre-Dose ECG- MD must read and sign prior to dosing- done and read by dr Shipley  Pre-Dose Vitals- use same arm for BP for all visits  Pre-Dose Research Labs  Study drug infusion  30 min post infusion observation   Post-Dose Vital Signs- same arm for all visits       Weight-Based Dosing     Sponsor allows participant weight taken from previous VISIT to calculate dose unless there is a noted weight difference of 10% weight gain or loss      Reference weight (kg) provided by coordinator 97.8kg   Date measured 4/22/2024  Actual weight  96.9kg   Date Measured 05/20/2024     Safety Parameters and Special Instructions   [id number] is a monoclonal antibody.   Amyloid is a protein that builds up in the brains of people who have Alzheimer's disease (AD). This build-up of amyloid protein in the brain is called “amyloid plaque” and may lead to impairment in memory and thinking.  [id number] binds to amyloid in the brain and has been shown to reduce the amount of this abnormal protein.   Side effects:   Infusion reactions (Flushing, skin rash, fever, chills, general body aches, feeling shaky, swelling tissues, muscle constrictions, shortness of breath), abnormally low blood pressure,  headache, vasogenic edema (build-up  of fluid in the brain most often temporary), cerebral hemorrhages, diarrhea, nausea, and cough.        PRE-DOSE Urine Collection     Test Volume Tube Handling Collection Time   Research Urinalysis 25 mL Collection Cup Ambient- to DCRU Lab 1030     PRE-DOSE Medications  NONE ORDERED   Document medication administration in MAR activity.   As ordered  Administer within 30 min prior to dosing       PRE-DOSE ECG-12 lead      Before Research Labs  Prior to dosing  Single  Time supine at least 5 min prior  Study Specific ECG Machine---NOT WORKING OBTAINED WITH  ECG MACHINE PER COORDINATOR APPROVAL    MD/Provider must review & sign before dosing   QTcF calculation using Fridericia's formula  If QTcF is greater than 450ms, obtain 2 additional ECG's with QtcF Calculation and notify covering MD and    ECG #1 QTcF ECG #2 QTcF ECG #3 QTcF   414  na  na      Time Point   Time Completed   Pre Dose ECG 09:24 am   ECG 2- if QTcF is greater than 450ms na   ECG 3- if QTcF is greater than 450ms na     PRE-DOSE Vital Signs see epic FLOW SHEETS   Prior to dosing document following Vitals.  Semi-supine x 3 minutes before obtaining.   BP same arm for all visits  -see coordinator note for which arm to use  HR     Temperature     Respirations       Time Obtained:  09:19 BP Arm: LA     PRE-DOSE Research Correlatives:     Refer to orders placed by   Access Type: #22 peripheral saline lock Location: RAC   Time point Specimen Test Volume Tube Handling Draw Time    Pre Dose             Chemistry 4 mL Red Top Serum Ambient, Invert 5X   1040    Hematology 2 mL EDTA Ambient, Invert 10X     PD Biomarkers 2 x 10 mL EDTA Ambient, Invert 10X         Infusion-Related Reactions   Review Safety Parameters on the medication Order. Follow  Hypersensitivity Orders.       A-45 Dose Level    Medication Name   Titration: [id number] 5mg/kg or placebo IV every 2 weeks thru 8 weeks (Visits 6-9) then   Induction: RTV4360 10mg/kg or  placebo IV every 2 weeks (Visits 10-53) thru 96 weeks then   Maintenance: [id number] 10mg/kg or placebo IV every 4 weeks (Visits 54-84) thru 216 weeks     Research Drug Administration   Document medication administration in MAR activity. Research specific instructions below.  Administer [id number]  IV in 250 mL NaCl 0.9%(Final Volume).     Use 0.2 micron in-line filter.    Administer dose thru NON-DEHP Pathway (non-DEHP Tubing and Bag).  IDS (Investigational Drug Services) will prime filter & tubing with active drug. Infuse over approximately 60 minutes   BUD (Beyond Use Date): 24 hours at room temperature.  As IV bag empties, flush line using 50 mL NaCl 0.9% in PhaSeal® syringe to ensure entire dose is given.    Infusion Reactions:  Use [facility] orders. If the infusion reaction improves or resolves, infusion may be resumed if the investigator considers it safe to do so in his/her clinical judgment. If so, then resume the infusion at 50% of the prior rate once the infusion reaction has resolved; the infusion duration should not exceed 2 hours.    Observation 30 min after the end of the infusion      BAN or placebo administration   Begin Infusion 1052 RN nb   End Infusion  11:55 RN nb     POST-DOSE Vital Signs x 3  (SEE FLOW SHEETS)   Document following Vitals at the time points listed below.    Semi-supine x 3 minutes before obtaining.   BP same arm for all visits  -see coordinator note for which arm to use  HR     Temperature     Respirations       EOI  Time Obtained: 1156     BP  Arm: LA   + 30 min  EOI Time Obtained: 1226    BP  Arm: LA     = 30 min POST-DOSE Observation   Post Dose Observation End Time 1225     Discharge Instructions   Patient may be discharged to home after observations is complete   Remind patient to return for next study visit    3- nina Rothman RN  05/20/24

## 2024-06-07 DIAGNOSIS — Z00.6 CLINICAL TRIAL PARTICIPANT: Primary | ICD-10-CM

## 2024-07-11 DIAGNOSIS — Z00.6 CLINICAL TRIAL PARTICIPANT: Primary | ICD-10-CM

## 2024-07-15 DIAGNOSIS — Z00.6 RESEARCH STUDY PATIENT: Primary | ICD-10-CM

## 2024-07-25 ENCOUNTER — APPOINTMENT (OUTPATIENT)
Dept: PRIMARY CARE | Facility: CLINIC | Age: 74
End: 2024-07-25
Payer: MEDICARE

## 2024-07-25 ENCOUNTER — LAB (OUTPATIENT)
Dept: LAB | Facility: LAB | Age: 74
End: 2024-07-25
Payer: MEDICARE

## 2024-07-25 VITALS
RESPIRATION RATE: 16 BRPM | HEART RATE: 58 BPM | WEIGHT: 208 LBS | SYSTOLIC BLOOD PRESSURE: 117 MMHG | OXYGEN SATURATION: 93 % | HEIGHT: 74 IN | TEMPERATURE: 97.9 F | DIASTOLIC BLOOD PRESSURE: 75 MMHG | BODY MASS INDEX: 26.69 KG/M2

## 2024-07-25 DIAGNOSIS — J18.9 PNEUMONIA OF RIGHT UPPER LOBE DUE TO INFECTIOUS ORGANISM: ICD-10-CM

## 2024-07-25 DIAGNOSIS — Z00.00 ROUTINE GENERAL MEDICAL EXAMINATION AT HEALTH CARE FACILITY: Primary | ICD-10-CM

## 2024-07-25 DIAGNOSIS — J47.9 BRONCHIECTASIS, UNCOMPLICATED (MULTI): ICD-10-CM

## 2024-07-25 DIAGNOSIS — Z00.00 ROUTINE GENERAL MEDICAL EXAMINATION AT HEALTH CARE FACILITY: ICD-10-CM

## 2024-07-25 PROBLEM — R06.2 WHEEZING: Status: RESOLVED | Noted: 2023-10-01 | Resolved: 2024-07-25

## 2024-07-25 LAB
ALBUMIN SERPL BCP-MCNC: 4.3 G/DL (ref 3.4–5)
ALP SERPL-CCNC: 81 U/L (ref 33–136)
ALT SERPL W P-5'-P-CCNC: 14 U/L (ref 10–52)
ANION GAP SERPL CALC-SCNC: 10 MMOL/L (ref 10–20)
AST SERPL W P-5'-P-CCNC: 18 U/L (ref 9–39)
BASOPHILS # BLD AUTO: 0.05 X10*3/UL (ref 0–0.1)
BASOPHILS NFR BLD AUTO: 0.8 %
BILIRUB SERPL-MCNC: 1.1 MG/DL (ref 0–1.2)
BUN SERPL-MCNC: 20 MG/DL (ref 6–23)
CALCIUM SERPL-MCNC: 9.4 MG/DL (ref 8.6–10.3)
CHLORIDE SERPL-SCNC: 103 MMOL/L (ref 98–107)
CHOLEST SERPL-MCNC: 192 MG/DL (ref 0–199)
CHOLESTEROL/HDL RATIO: 3.4
CO2 SERPL-SCNC: 31 MMOL/L (ref 21–32)
CREAT SERPL-MCNC: 1.15 MG/DL (ref 0.5–1.3)
EGFRCR SERPLBLD CKD-EPI 2021: 66 ML/MIN/1.73M*2
EOSINOPHIL # BLD AUTO: 0.06 X10*3/UL (ref 0–0.4)
EOSINOPHIL NFR BLD AUTO: 1 %
ERYTHROCYTE [DISTWIDTH] IN BLOOD BY AUTOMATED COUNT: 12.9 % (ref 11.5–14.5)
GLUCOSE SERPL-MCNC: 109 MG/DL (ref 74–99)
HCT VFR BLD AUTO: 46.4 % (ref 41–52)
HDLC SERPL-MCNC: 56.5 MG/DL
HGB BLD-MCNC: 15.3 G/DL (ref 13.5–17.5)
IMM GRANULOCYTES # BLD AUTO: 0.02 X10*3/UL (ref 0–0.5)
IMM GRANULOCYTES NFR BLD AUTO: 0.3 % (ref 0–0.9)
LDLC SERPL CALC-MCNC: 123 MG/DL
LYMPHOCYTES # BLD AUTO: 1.97 X10*3/UL (ref 0.8–3)
LYMPHOCYTES NFR BLD AUTO: 31.8 %
MCH RBC QN AUTO: 30.3 PG (ref 26–34)
MCHC RBC AUTO-ENTMCNC: 33 G/DL (ref 32–36)
MCV RBC AUTO: 92 FL (ref 80–100)
MONOCYTES # BLD AUTO: 0.55 X10*3/UL (ref 0.05–0.8)
MONOCYTES NFR BLD AUTO: 8.9 %
NEUTROPHILS # BLD AUTO: 3.54 X10*3/UL (ref 1.6–5.5)
NEUTROPHILS NFR BLD AUTO: 57.2 %
NON HDL CHOLESTEROL: 136 MG/DL (ref 0–149)
NRBC BLD-RTO: 0 /100 WBCS (ref 0–0)
PLATELET # BLD AUTO: 169 X10*3/UL (ref 150–450)
POTASSIUM SERPL-SCNC: 4.2 MMOL/L (ref 3.5–5.3)
PROT SERPL-MCNC: 6.3 G/DL (ref 6.4–8.2)
RBC # BLD AUTO: 5.05 X10*6/UL (ref 4.5–5.9)
SODIUM SERPL-SCNC: 140 MMOL/L (ref 136–145)
TRIGL SERPL-MCNC: 65 MG/DL (ref 0–149)
VLDL: 13 MG/DL (ref 0–40)
WBC # BLD AUTO: 6.2 X10*3/UL (ref 4.4–11.3)

## 2024-07-25 PROCEDURE — 1124F ACP DISCUSS-NO DSCNMKR DOCD: CPT | Performed by: STUDENT IN AN ORGANIZED HEALTH CARE EDUCATION/TRAINING PROGRAM

## 2024-07-25 PROCEDURE — 85025 COMPLETE CBC W/AUTO DIFF WBC: CPT

## 2024-07-25 PROCEDURE — 99397 PER PM REEVAL EST PAT 65+ YR: CPT | Performed by: STUDENT IN AN ORGANIZED HEALTH CARE EDUCATION/TRAINING PROGRAM

## 2024-07-25 PROCEDURE — 1160F RVW MEDS BY RX/DR IN RCRD: CPT | Performed by: STUDENT IN AN ORGANIZED HEALTH CARE EDUCATION/TRAINING PROGRAM

## 2024-07-25 PROCEDURE — 1170F FXNL STATUS ASSESSED: CPT | Performed by: STUDENT IN AN ORGANIZED HEALTH CARE EDUCATION/TRAINING PROGRAM

## 2024-07-25 PROCEDURE — 1159F MED LIST DOCD IN RCRD: CPT | Performed by: STUDENT IN AN ORGANIZED HEALTH CARE EDUCATION/TRAINING PROGRAM

## 2024-07-25 SDOH — ECONOMIC STABILITY: FOOD INSECURITY: WITHIN THE PAST 12 MONTHS, YOU WORRIED THAT YOUR FOOD WOULD RUN OUT BEFORE YOU GOT MONEY TO BUY MORE.: NEVER TRUE

## 2024-07-25 SDOH — ECONOMIC STABILITY: FOOD INSECURITY: WITHIN THE PAST 12 MONTHS, THE FOOD YOU BOUGHT JUST DIDN'T LAST AND YOU DIDN'T HAVE MONEY TO GET MORE.: NEVER TRUE

## 2024-07-25 SDOH — ECONOMIC STABILITY: INCOME INSECURITY: IN THE LAST 12 MONTHS, WAS THERE A TIME WHEN YOU WERE NOT ABLE TO PAY THE MORTGAGE OR RENT ON TIME?: NO

## 2024-07-25 SDOH — ECONOMIC STABILITY: GENERAL
WHICH OF THE FOLLOWING DO YOU KNOW HOW TO USE AND HAVE ACCESS TO EVERY DAY? (CHOOSE ALL THAT APPLY): SMARTPHONE WITH CELLULAR DATA PLAN

## 2024-07-25 SDOH — HEALTH STABILITY: PHYSICAL HEALTH: ON AVERAGE, HOW MANY MINUTES DO YOU ENGAGE IN EXERCISE AT THIS LEVEL?: 60 MIN

## 2024-07-25 SDOH — HEALTH STABILITY: PHYSICAL HEALTH: ON AVERAGE, HOW MANY DAYS PER WEEK DO YOU ENGAGE IN MODERATE TO STRENUOUS EXERCISE (LIKE A BRISK WALK)?: 4 DAYS

## 2024-07-25 ASSESSMENT — SOCIAL DETERMINANTS OF HEALTH (SDOH)
HOW OFTEN DO YOU GET TOGETHER WITH FRIENDS OR RELATIVES?: TWICE A WEEK
HOW OFTEN DO YOU ATTEND CHURCH OR RELIGIOUS SERVICES?: MORE THAN 4 TIMES PER YEAR
WITHIN THE LAST YEAR, HAVE YOU BEEN HUMILIATED OR EMOTIONALLY ABUSED IN OTHER WAYS BY YOUR PARTNER OR EX-PARTNER?: NO
WITHIN THE LAST YEAR, HAVE YOU BEEN AFRAID OF YOUR PARTNER OR EX-PARTNER?: NO
IN THE PAST 12 MONTHS, HAS THE ELECTRIC, GAS, OIL, OR WATER COMPANY THREATENED TO SHUT OFF SERVICE IN YOUR HOME?: NO
WITHIN THE LAST YEAR, HAVE YOU BEEN KICKED, HIT, SLAPPED, OR OTHERWISE PHYSICALLY HURT BY YOUR PARTNER OR EX-PARTNER?: NO
HOW HARD IS IT FOR YOU TO PAY FOR THE VERY BASICS LIKE FOOD, HOUSING, MEDICAL CARE, AND HEATING?: NOT HARD AT ALL
IN A TYPICAL WEEK, HOW MANY TIMES DO YOU TALK ON THE PHONE WITH FAMILY, FRIENDS, OR NEIGHBORS?: TWICE A WEEK
HOW OFTEN DO YOU ATTENT MEETINGS OF THE CLUB OR ORGANIZATION YOU BELONG TO?: MORE THAN 4 TIMES PER YEAR
DO YOU BELONG TO ANY CLUBS OR ORGANIZATIONS SUCH AS CHURCH GROUPS UNIONS, FRATERNAL OR ATHLETIC GROUPS, OR SCHOOL GROUPS?: YES
WITHIN THE LAST YEAR, HAVE TO BEEN RAPED OR FORCED TO HAVE ANY KIND OF SEXUAL ACTIVITY BY YOUR PARTNER OR EX-PARTNER?: NO

## 2024-07-25 ASSESSMENT — ENCOUNTER SYMPTOMS
DEPRESSION: 0
OCCASIONAL FEELINGS OF UNSTEADINESS: 0
LOSS OF SENSATION IN FEET: 0

## 2024-07-25 ASSESSMENT — ACTIVITIES OF DAILY LIVING (ADL)
BATHING: INDEPENDENT
DRESSING: INDEPENDENT
MANAGING_FINANCES: INDEPENDENT
GROCERY_SHOPPING: INDEPENDENT
DOING_HOUSEWORK: INDEPENDENT
TAKING_MEDICATION: INDEPENDENT

## 2024-07-25 ASSESSMENT — PATIENT HEALTH QUESTIONNAIRE - PHQ9
2. FEELING DOWN, DEPRESSED OR HOPELESS: NOT AT ALL
1. LITTLE INTEREST OR PLEASURE IN DOING THINGS: NOT AT ALL
SUM OF ALL RESPONSES TO PHQ9 QUESTIONS 1 AND 2: 0

## 2024-07-25 NOTE — PROGRESS NOTES
"Francis Kenean \"Bill\" is a 74 y.o. male who is here for a routine exam. PNA no problem at all. Severed quad tendon this time last year, rehabbed well. Increasing activity recently with regular exercise and feeling a lot better. Dropped 8 pounds due to diet and exercise. Interested in shingles and RSV vaccines.   Active Problem List      Comprehensive Medical/Surgical/Social/Family History  Past Medical History:   Diagnosis Date    Left lower lobe pneumonia 08/14/2023    Other specified health status     No pertinent past medical history    Pneumonia of right upper lobe due to infectious organism 10/17/2023    Rupture of quadriceps tendon 08/14/2023    Sprain of left rotator cuff capsule 01/04/2016    Wheezing 10/01/2023     Past Surgical History:   Procedure Laterality Date    OTHER SURGICAL HISTORY  05/17/2021    Hip surgery    OTHER SURGICAL HISTORY  05/17/2021    Back surgery    OTHER SURGICAL HISTORY  05/17/2021    Shoulder surgery     Social History     Social History Narrative    Not on file       Allergies and Medications  Bee venom protein (honey bee) and Poison ivy extract  No current outpatient medications on file prior to visit.     Current Facility-Administered Medications on File Prior to Visit   Medication Dose Route Frequency Provider Last Rate Last Admin    Study AHEAD [id number] or placebo 969 mg in sodium chloride 0.9% 250 mL IV  10 mg/kg intravenous Once Kenney Shipley MD        Study AHEAD [id number] or placebo 969 mg in sodium chloride 0.9% 250 mL IV  10 mg/kg intravenous Once Kenney Shipley MD        Study AHEAD [id number] or placebo 978 mg in sodium chloride 0.9% 250 mL IV  10 mg/kg (Order-Specific) intravenous Once Kenney Shipley MD        Study AHEAD [id number] or placebo 990 mg in sodium chloride 0.9% 250 mL IV  10 mg/kg intravenous Once Kenney Shipley MD           Concerns today:  No concerns     Lifestyle  Diet: Healthy, Well Balanced, and Home-cooked Likes sweets  Physical " "Activity: Sufficiently Active (7/25/2024)    Exercise Vital Sign     Days of Exercise per Week: 4 days     Minutes of Exercise per Session: 60 min      Tobacco Use: Low Risk  (7/25/2024)    Patient History     Smoking Tobacco Use: Never     Smokeless Tobacco Use: Never     Passive Exposure: Not on file      Alcohol Use: Not At Risk (7/25/2024)    AUDIT-C     Frequency of Alcohol Consumption: Never     Average Number of Drinks: Patient does not drink     Frequency of Binge Drinking: Never      Depression: Not at risk (7/25/2024)    PHQ-2     PHQ-2 Score: 0      Stress: No Stress Concern Present (7/25/2024)    [employer] - Occupational Stress Questionnaire     Feeling of Stress : Not at all       Consultants:  Saw Orthopedics for knee Surgery, Patty.   In research study   No other specialists        Colorectal Screening:   colonoscopy  Date: 2/11/2021  Nocturia: .Sparse  Erectile dysfunction: .Did not discuss    Review of Systems   Constitutional:  Negative for appetite change, fatigue and fever.   HENT:  Negative for ear discharge, ear pain, hearing loss, postnasal drip, rhinorrhea and sinus pain.    Eyes:  Negative for visual disturbance.   Respiratory:  Negative for shortness of breath.    Cardiovascular:  Negative for chest pain, palpitations and leg swelling.   Gastrointestinal:  Negative for abdominal pain, blood in stool, constipation, diarrhea, nausea and vomiting.   Genitourinary:  Negative for flank pain and hematuria.   Musculoskeletal:  Negative for arthralgias and myalgias.   Skin:  Negative for rash.   Neurological:  Negative for dizziness and light-headedness.   All other systems reviewed and are negative.      Objective   /75 (BP Location: Right arm, Patient Position: Sitting)   Pulse 58   Temp 36.6 °C (97.9 °F) (Temporal)   Resp 16   Ht 1.873 m (6' 1.75\")   Wt 94.3 kg (208 lb)   SpO2 93%   BMI 26.89 kg/m²     Physical Exam  Vitals and nursing note reviewed. "   Constitutional:       General: He is not in acute distress.     Appearance: Normal appearance. He is normal weight. He is not ill-appearing or toxic-appearing.   HENT:      Head: Normocephalic and atraumatic.      Right Ear: Tympanic membrane and ear canal normal.      Left Ear: Tympanic membrane and ear canal normal.      Nose: Nose normal. No congestion or rhinorrhea.      Mouth/Throat:      Mouth: Mucous membranes are moist.      Pharynx: Oropharynx is clear.   Eyes:      General: No scleral icterus.        Right eye: No discharge.         Left eye: No discharge.      Extraocular Movements: Extraocular movements intact.      Conjunctiva/sclera: Conjunctivae normal.      Pupils: Pupils are equal, round, and reactive to light.   Cardiovascular:      Rate and Rhythm: Normal rate and regular rhythm.      Heart sounds: Normal heart sounds. No murmur heard.     No friction rub. No gallop.   Pulmonary:      Effort: Pulmonary effort is normal. No respiratory distress.      Breath sounds: Normal breath sounds. No wheezing, rhonchi or rales.   Abdominal:      General: Abdomen is flat. There is no distension.      Palpations: Abdomen is soft.      Tenderness: There is no abdominal tenderness. There is no guarding.   Musculoskeletal:         General: Normal range of motion.      Cervical back: Normal range of motion and neck supple.      Right lower leg: No edema.      Left lower leg: No edema.   Lymphadenopathy:      Cervical: No cervical adenopathy.   Skin:     General: Skin is warm and dry.   Neurological:      General: No focal deficit present.      Mental Status: He is alert and oriented to person, place, and time. Mental status is at baseline.      Motor: No weakness.   Psychiatric:         Mood and Affect: Mood normal.         Behavior: Behavior normal.         Assessment/Plan   Problem List Items Addressed This Visit    None  Visit Diagnoses       Routine general medical examination at health care facility    -   Primary    Relevant Orders    Lipid panel (Completed)    Comprehensive metabolic panel (Completed)    CBC and Auto Differential    BMI 26.0-26.9,adult        Relevant Orders    CBC and Auto Differential          Reviewed Social Determinants of health with patient, discussed healthy lifestyle including 150 minutes of physical activity per week  Ordered/Reviewed baseline labwork -CBC, CMP, Lipid Panel  Immunizations: Recommended RSV shot and Recommended Shingrix series   Colonoscopy Up to date  Discussed advanced care documents, wife milla is power of . He has a living will. These are not on file, we discussed getting a copy in here in the office  Follow up in one year for annual wellness or sooner as needed

## 2024-07-26 NOTE — RESULT ENCOUNTER NOTE
Hello,    Your lab work is back and it looks very good.  Only abnormality was a slight elevation in blood sugar.  It is only mildly elevated.  It is nowhere near the level of diabetes.  It is something that we will continue to watch with annual lab work but no further evaluation is necessary at this time.

## 2024-07-28 PROBLEM — J47.9 BRONCHIECTASIS, UNCOMPLICATED (MULTI): Status: ACTIVE | Noted: 2024-07-28

## 2024-07-28 ASSESSMENT — ENCOUNTER SYMPTOMS
PALPITATIONS: 0
SHORTNESS OF BREATH: 0
ABDOMINAL PAIN: 0
MYALGIAS: 0
ARTHRALGIAS: 0
LIGHT-HEADEDNESS: 0
HEMATURIA: 0
SINUS PAIN: 0
CONSTIPATION: 0
BLOOD IN STOOL: 0
DIZZINESS: 0
VOMITING: 0
RHINORRHEA: 0
FEVER: 0
NAUSEA: 0
FLANK PAIN: 0
DIARRHEA: 0
FATIGUE: 0
APPETITE CHANGE: 0

## 2024-08-09 DIAGNOSIS — Z00.6 CLINICAL TRIAL PARTICIPANT: Primary | ICD-10-CM

## 2024-08-12 ENCOUNTER — HOSPITAL ENCOUNTER (OUTPATIENT)
Dept: RESEARCH | Facility: HOSPITAL | Age: 74
Discharge: HOME | End: 2024-08-12
Payer: MEDICARE

## 2024-08-12 VITALS
HEART RATE: 45 BPM | TEMPERATURE: 97 F | OXYGEN SATURATION: 98 % | DIASTOLIC BLOOD PRESSURE: 69 MMHG | WEIGHT: 213.19 LBS | BODY MASS INDEX: 27.56 KG/M2 | SYSTOLIC BLOOD PRESSURE: 127 MMHG | RESPIRATION RATE: 17 BRPM

## 2024-08-12 DIAGNOSIS — T78.40XA CURRENT HYPERSENSITIVITY REACTION, INITIAL ENCOUNTER: ICD-10-CM

## 2024-08-12 DIAGNOSIS — Z00.6 CLINICAL TRIAL PARTICIPANT: ICD-10-CM

## 2024-08-12 PROCEDURE — 2560000001 HC RX 256 EXPERIMENTAL DRUGS: Performed by: PSYCHIATRY & NEUROLOGY

## 2024-08-12 RX ORDER — ALBUTEROL SULFATE 0.83 MG/ML
3 SOLUTION RESPIRATORY (INHALATION) ONCE AS NEEDED
Status: DISCONTINUED | OUTPATIENT
Start: 2024-08-12 | End: 2024-08-13 | Stop reason: HOSPADM

## 2024-08-12 RX ORDER — EPINEPHRINE 1 MG/ML
0.3 INJECTION INTRAMUSCULAR; INTRAVENOUS; SUBCUTANEOUS AS NEEDED
Status: DISCONTINUED | OUTPATIENT
Start: 2024-08-12 | End: 2024-08-13 | Stop reason: HOSPADM

## 2024-08-12 RX ORDER — FAMOTIDINE 10 MG/ML
20 INJECTION INTRAVENOUS ONCE AS NEEDED
Status: DISCONTINUED | OUTPATIENT
Start: 2024-08-12 | End: 2024-08-13 | Stop reason: HOSPADM

## 2024-08-12 RX ORDER — DIPHENHYDRAMINE HYDROCHLORIDE 50 MG/ML
50 INJECTION INTRAMUSCULAR; INTRAVENOUS ONCE AS NEEDED
Status: DISCONTINUED | OUTPATIENT
Start: 2024-08-12 | End: 2024-08-13 | Stop reason: HOSPADM

## 2024-08-12 NOTE — RESEARCH NOTES
[facility] RESEARCH CLINICAL VISIT NOTE  Study Name: AHEAD 3-45 [id number]  IRB#: [id number]  [id number]  Protocol Version Dated: V5 08-APR-2022  PI: Kenney Shipley  Time point: ALL OTHER INFUSION ONLY VISITS    Encounter Date: 08/12/2024  Encounter Time:  9:00 AM EDT  Encounter Department: [facility] HEMATOLOGY AND ONCOLOGY     #1    Phone Secure Chat   Laura Mackeyi  [phone]     #2 Phone Secure Chat   Christin Berkowitzon [phone]    Other Phone Secure Chat        Visit Provider: Antoine Dale RN  Francis Keenan is a 74 y.o. male and is here for a Research clinical visit.  Allergies:   Allergies   Allergen Reactions    Bee Venom Protein (Honey Bee) Unknown    Poison Ivy Extract Unknown     Study Regimen and Dosing   A3-45 Trial-The study is a 216-week treatment, multicenter, double-blind, placebo-controlled, parallel-treatment arm study in subjects with preclinical AD and elevated amyloid (A45 Trial) and subjects with early preclinical AD and intermediate amyloid beta (A?i) (A3 Trial)   A-45 Trial:  [id number] placebo:  5 mg/kg IV Q2W through 8 weeks (titration), then 10 mg/kg IV Q2W through 96 weeks (induction), then 10 mg/kg IV Q4W through 216 weeks (maintenance).       Admission and Prior to Starting Study Activities   Obtain weight in kilograms - with shoes off & heavy items removed.  Verify with coordinator there is not a 10% change in weight from previous visits weight.   Insert one peripheral IV line for sample collection procedures (flush line with 5 - 10 mL normal saline following each blood draw).  Access mediport (if available) otherwise insert second peripheral line in opposite arm for Investigative drug administration (if peripheral line, flush line with 5 - 10 mL normal saline before & after infusion)Exceptions:        Criteria to Treat   DCRU RN reviewed and meets eligibility to proceed with treatment plan   Time team notified:  0925   DCRU RN notifies study team to review eligibility and approval before dosing procedures  Time team approves: 0925      Dietary Guidelines   Regular Diet    Objective   Vital Signs:   Vitals:    08/12/24 0854   BP: 108/59   Pulse: 54   Resp: 17   Temp: 36.1 °C (97 °F)   TempSrc: Oral   SpO2: 98%   Weight: 96.7 kg (213 lb 3 oz)     Medications as of the completion of today's visit:  Orders placed during today's visit:  Orders Placed This Encounter   Procedures    Nursing Communication - Hypersensitivity Management, Moderate     Flushing, rash, pruritus, dyspnea, chest discomfort, back pain, angioedema, SBP LESS THAN 90 mmHg, and/or change in mental status above or below patient's baseline. In the event of moderate or severe hypersensitivity reaction to any medication:  Stop infusion.  Assess vital signs, pulse oximetry, nursing assessment, and patient complaints.  Administer medications per Hypersensitivity Reaction Medication Protocol.  Notify physician.     Standing Status:   Standing     Number of Occurrences:   1    Nursing Communication - Hypersensitivity Management, Severe     Worsening of moderate reaction symptoms, SBP LESS THAN 80 mmHg, and/or respiratory distress (respirations GREATER THAN 40 breaths per minute, wheezing, life-threatening symptoms). In the event of moderate or severe hypersensitivity reaction to any medication:  Stop infusion.  Assess vital signs, pulse oximetry, nursing assessment, and patient complaints.  Administer medications per Hypersensitivity Reaction Medication Protocol.  Notify physician.     Standing Status:   Standing     Number of Occurrences:   1    Nursing Communication - Respiratory Management     Oxygen via nasal cannula at 4 L per minute for respiratory rate GREATER THAN OR EQUAL TO to 28 breaths/minute and/or wheezing. Pulse Oximetry continuous monitoring.     Standing Status:   Standing     Number of Occurrences:   1    Pulse oximetry, continuous     Continuous  monitoring to maintain SPO2 GREATER THAN 90%     Standing Status:   Standing     Number of Occurrences:   1   [id number] - AHEAD - A3 A45    Patient has no adverse events documented in the Research Adverse Events activity.    Study Specific Instructions and Documentation   If available enter a snapshot of performable actions in the order performed:  Premedication  Pre-Dose Vitals- use same arm for BP for all visits  Study drug infusion  30 min post infusion observation   Post-Dose Vital Signs- same arm for all visits       Weight-Based Dosing     Sponsor allows participant weight taken from previous VISIT to calculate dose unless there is a noted weight difference of 10% weight gain or loss      Reference weight (kg) provided by coordinator 96.9  Date measured 5/20/2024  Actual weight   Vitals:    08/12/24 0854   Weight: 96.7 kg (213 lb 3 oz)     Date Measured 08/12/2024     Safety Parameters and Special Instructions   [id number] is a monoclonal antibody.   Amyloid is a protein that builds up in the brains of people who have Alzheimer's disease (AD). This build-up of amyloid protein in the brain is called “amyloid plaque” and may lead to impairment in memory and thinking.  [id number] binds to amyloid in the brain and has been shown to reduce the amount of this abnormal protein.   Side effects:   Infusion reactions (Flushing, skin rash, fever, chills, general body aches, feeling shaky, swelling tissues, muscle constrictions, shortness of breath), abnormally low blood pressure,  headache, vasogenic edema (build-up of fluid in the brain most often temporary), cerebral hemorrhages, diarrhea, nausea, and cough.        PRE-DOSE Medications   Document medication administration in MAR activity.   As ordered  Administer within 30 min prior to dosing       PRE-DOSE Vital Signs   Prior to dosing document following Vitals.  Semi-supine x 3 minutes before obtaining.   BP same arm for all visits  -see coordinator note for which arm to  use  HR     Temperature     Respirations       Time Obtained: 1000 BP Arm: left arm     Infusion-Related Reactions   Review Safety Parameters on the medication Order. Follow  Hypersensitivity Orders.       A-45 Dose Level    Medication Name   Titration: [id number] 5mg/kg or placebo IV every 2 weeks thru 8 weeks (Visits 6-9) then   Induction: [id number] 10mg/kg or placebo IV every 2 weeks (Visits 10-53) thru 96 weeks then   Maintenance: [id number] 10mg/kg or placebo IV every 4 weeks (Visits 54-84) thru 216 weeks     BAN2401-Placebo Research Drug Administration   Document medication administration in MAR activity. Research specific instructions below.   Use 0.2 micron in-line filter.    Administer dose thru NON-DEHP Pathway (non-DEHP Tubing and Bag).  IDS (Investigational Drug Services) will prime filter & tubing with active drug. Infuse over approximately 60 minutes   BUD (Beyond Use Date): 24 hours at room temperature.  As IV bag empties, flush line using 50 mL NaCl 0.9% in PhaSeal® syringe to ensure entire dose is given.    Infusion Reactions:  Use  orders. If the infusion reaction improves or resolves, infusion may be resumed if the investigator considers it safe to do so in his/her clinical judgment. If so, then resume the infusion at 50% of the prior rate once the infusion reaction has resolved; the infusion duration should not exceed 2 hours.    Observation time  30 min after the end of the infusion      BAN or placebo administration   Begin Infusion           1003 RN A DiCola   End Infusion              1106 RN A DiCola     POST-DOSE Vital Signs x 2   Document following Vitals at the time points listed below.    Semi-supine x 3 minutes before obtaining.   BP same arm for all visits  -see coordinator note for which arm to use  HR     Temperature     Respirations       EOI   Time Obtained: 1106        BP arm left arm   + 30 MIN EOI    Time Obtained: 1136        BP arm left arm     30 min POST-DOSE Observation   Post  Dose Observation End Time 1136     Discharge Instructions   Patient may be discharged to home after observations is complete.   Remind patient to return for next study visit    Antoine Dale RN  08/12/24

## 2024-09-06 DIAGNOSIS — Z00.6 RESEARCH SUBJECT: Primary | ICD-10-CM

## 2024-09-06 NOTE — ADDENDUM NOTE
Encounter addended by: Marci Stephens [facility] on: 9/6/2024 4:32 PM   Actions taken: Verified order dispensed

## 2024-09-16 ENCOUNTER — HOSPITAL ENCOUNTER (OUTPATIENT)
Dept: RADIOLOGY | Facility: HOSPITAL | Age: 74
Discharge: HOME | End: 2024-09-16
Payer: MEDICARE

## 2024-09-16 ENCOUNTER — HOSPITAL ENCOUNTER (OUTPATIENT)
Dept: RADIOLOGY | Facility: CLINIC | Age: 74
Discharge: HOME | End: 2024-09-16
Payer: MEDICARE

## 2024-09-16 ENCOUNTER — OFFICE VISIT (OUTPATIENT)
Dept: ORTHOPEDIC SURGERY | Facility: CLINIC | Age: 74
End: 2024-09-16
Payer: MEDICARE

## 2024-09-16 ENCOUNTER — HOSPITAL ENCOUNTER (EMERGENCY)
Facility: HOSPITAL | Age: 74
Discharge: STILL ADMITTED OR ADMINISTRATIVE | End: 2024-09-16
Payer: MEDICARE

## 2024-09-16 VITALS — BODY MASS INDEX: 28.23 KG/M2 | WEIGHT: 213 LBS | HEIGHT: 73 IN

## 2024-09-16 DIAGNOSIS — S89.91XA RIGHT KNEE INJURY, INITIAL ENCOUNTER: ICD-10-CM

## 2024-09-16 DIAGNOSIS — S76.111A QUADRICEPS TENDON RUPTURE, RIGHT, INITIAL ENCOUNTER: ICD-10-CM

## 2024-09-16 DIAGNOSIS — S89.91XA RIGHT KNEE INJURY, INITIAL ENCOUNTER: Primary | ICD-10-CM

## 2024-09-16 PROCEDURE — 1159F MED LIST DOCD IN RCRD: CPT | Performed by: INTERNAL MEDICINE

## 2024-09-16 PROCEDURE — 99284 EMERGENCY DEPT VISIT MOD MDM: CPT

## 2024-09-16 PROCEDURE — 99214 OFFICE O/P EST MOD 30 MIN: CPT | Performed by: INTERNAL MEDICINE

## 2024-09-16 PROCEDURE — 4500999001 HC ED NO CHARGE

## 2024-09-16 PROCEDURE — 73721 MRI JNT OF LWR EXTRE W/O DYE: CPT | Mod: RT

## 2024-09-16 PROCEDURE — 73564 X-RAY EXAM KNEE 4 OR MORE: CPT | Mod: RT

## 2024-09-16 PROCEDURE — 73721 MRI JNT OF LWR EXTRE W/O DYE: CPT | Mod: RIGHT SIDE | Performed by: RADIOLOGY

## 2024-09-16 PROCEDURE — 73564 X-RAY EXAM KNEE 4 OR MORE: CPT | Mod: RIGHT SIDE | Performed by: INTERNAL MEDICINE

## 2024-09-16 ASSESSMENT — PATIENT HEALTH QUESTIONNAIRE - PHQ9
1. LITTLE INTEREST OR PLEASURE IN DOING THINGS: NOT AT ALL
SUM OF ALL RESPONSES TO PHQ9 QUESTIONS 1 AND 2: 0
2. FEELING DOWN, DEPRESSED OR HOPELESS: NOT AT ALL

## 2024-09-16 NOTE — PROGRESS NOTES
"  Acute Injury New Patient Visit    CC:   Chief Complaint   Patient presents with    Right Knee - Pain     Right knee , fell today playing pickleball , pain all around his knee, xrays today       HPI: Wiliam \"Gigi" is a 74 y.o. male presents today for evaluation for acute right knee injury sustained earlier this morning while playing pickle ball. He states that he fell onto his right knee. He notes previous quad tendon rupture last year which was treated by Dr. Brambila on the left knee. He is here for initial evaluation and x-rays.         Review of Systems   GENERAL: Negative for malaise, significant weight loss, fever  MUSCULOSKELETAL: See HPI  NEURO:  Negative for numbness / tingling     Past Medical History  Past Medical History:   Diagnosis Date    Left lower lobe pneumonia 08/14/2023    Other specified health status     No pertinent past medical history    Pneumonia of right upper lobe due to infectious organism 10/17/2023    Rupture of quadriceps tendon 08/14/2023    Sprain of left rotator cuff capsule 01/04/2016    Wheezing 10/01/2023       Medication review  Medication Documentation Review Audit       Reviewed by Kayode Saavedra MA (Medical Assistant) on 09/16/24 at 0911      Medication Order Taking? Sig Documenting Provider Last Dose Status   Study AHEAD [id number] or placebo 967 mg in sodium chloride 0.9% 250 mL IV [id number]   Kenney Shipley MD  Active   Study AHEAD [id number] or placebo 969 mg in sodium chloride 0.9% 250 mL IV [id number]   Kenney Shipley MD  Active   Study AHEAD [id number] or placebo 969 mg in sodium chloride 0.9% 250 mL IV [id number]   Kenney Shipley MD  Active   Study AHEAD [id number] or placebo 978 mg in sodium chloride 0.9% 250 mL IV [id number]   Kenney Shipley MD  Active   Study AHEAD [id number] or placebo 990 mg in sodium chloride 0.9% 250 mL IV [id number]   Kenney Shipley MD  Active                    Allergies  Allergies   Allergen Reactions    Bee Venom Protein (Honey Bee) Unknown    Poison " Santa Extract Unknown       Social History  Social History     Socioeconomic History    Marital status:      Spouse name: Not on file    Number of children: Not on file    Years of education: Not on file    Highest education level: Not on file   Occupational History    Not on file   Tobacco Use    Smoking status: Never    Smokeless tobacco: Never   Vaping Use    Vaping status: Never Used   Substance and Sexual Activity    Alcohol use: Never    Drug use: Never    Sexual activity: Not on file   Other Topics Concern    Not on file   Social History Narrative    Not on file     Social Determinants of Health     Financial Resource Strain: Low Risk  (7/25/2024)    Overall Financial Resource Strain (CARDIA)     Difficulty of Paying Living Expenses: Not hard at all   Food Insecurity: No Food Insecurity (7/25/2024)    Hunger Vital Sign     Worried About Running Out of Food in the Last Year: Never true     Ran Out of Food in the Last Year: Never true   Transportation Needs: No Transportation Needs (7/25/2024)    PRAPARE - Transportation     Lack of Transportation (Medical): No     Lack of Transportation (Non-Medical): No   Physical Activity: Sufficiently Active (7/25/2024)    Exercise Vital Sign     Days of Exercise per Week: 4 days     Minutes of Exercise per Session: 60 min   Stress: No Stress Concern Present (7/25/2024)    [employer] [place] [employer] - Occupational Stress Questionnaire     Feeling of Stress : Not at all   Social Connections: Socially Integrated (7/25/2024)    Social Connection and Isolation Panel [NHANES]     Frequency of Communication with Friends and Family: Twice a week     Frequency of Social Gatherings with Friends and Family: Twice a week     Attends Druze Services: More than 4 times per year     Active Member of Clubs or Organizations: Yes     Attends Club or Organization Meetings: More than 4 times per year     Marital Status:    Intimate Partner Violence: Not At Risk  (7/25/2024)    Humiliation, Afraid, Rape, and Kick questionnaire     Fear of Current or Ex-Partner: No     Emotionally Abused: No     Physically Abused: No     Sexually Abused: No   Housing Stability: Unknown (7/25/2024)    Housing Stability Vital Sign     Unable to Pay for Housing in the Last Year: No     Number of Times Moved in the Last Year: Not on file     Homeless in the Last Year: No       Surgical History  Past Surgical History:   Procedure Laterality Date    OTHER SURGICAL HISTORY  05/17/2021    Hip surgery    OTHER SURGICAL HISTORY  05/17/2021    Back surgery    OTHER SURGICAL HISTORY  05/17/2021    Shoulder surgery       Physical Exam:  GENERAL:  Patient is awake, alert, and oriented to person place and time.  Patient appears well nourished and well kept.  Affect Calm, Not Acutely Distressed.  HEENT:  Normocephalic, Atraumatic, EOMI  CARDIOVASCULAR:  Hemodynamically stable.  RESPIRATORY:  Normal respirations with unlabored breathing.  Extremity: Right knee examination shows skin is intact.  There is no erythema or warmth.  1-2+ effusion.  Can flex the right knee to 100 degrees with pain.  He is unable to fully extend, with noticeable defect of the quadricep tendon.  He is unable to form a satisfactory straight leg test, concerning for quadricep tendon rupture.  No pain over the medial joint line.  No pain over the lateral joint line.  There is no pain over the patellar.  Pain over the quadricep tendon at its insertion to patella, with quadricep tendon gap concerning for full-thickness quadricep tendon tear no pain over the proximal tibia.  No pain over the popliteal fossa.  Negative valgus stress test.  Negative varus stress test.  Negative Ebony's test medially with no instability.  Negative Ebony's test laterally with no instability.  Negative Lachman's test.  Patellar and quadricep mechanism intact.  Negative anterior and posterior drawer test.  Negative patellar apprehension test.  Distal pulses  "are palpable, neurovascularly intact.  Walking with no significant antalgic gait.      Diagnostics: X-rays reviewed      Procedure: None    Assessment: Right  quadriceps tendon rupture    Plan: Wiliam \"Jamar\" presents today for initial evaluation for acute right quadriceps tendon rupture sustained earlier this morning while playing pickle ball. We recommended a knee immobilizer and a stat MRI of the right knee for preoperative planning for quadricep tendon rupture. He will follow-up with Dr Brambila after the MRI, whom he has seen before in the past for his left knee.     Orders Placed This Encounter    XR knee right 4+ views      At the conclusion of the visit there were no further questions by the patient/family regarding their plan of care.  Patient was instructed to call or return with any issues, questions, or concerns regarding their injury and/or treatment plan described above.     09/16/24 at 9:31 AM - Doc Degroot MD  Scribe Attestation  By signing my name below, I, Guanaco Mast, Scribe   attest that this documentation has been prepared under the direction and in the presence of Doc Degroot MD.    Office: [phone]    This note was prepared using voice recognition software.  The details of this note are correct and have been reviewed, and corrected to the best of my ability.  Some grammatical errors may persist related to the Dragon software.  "

## 2024-09-17 ENCOUNTER — ANCILLARY PROCEDURE (OUTPATIENT)
Dept: CARDIOLOGY | Facility: CLINIC | Age: 74
End: 2024-09-17
Payer: MEDICARE

## 2024-09-17 ENCOUNTER — OFFICE VISIT (OUTPATIENT)
Dept: ORTHOPEDIC SURGERY | Facility: CLINIC | Age: 74
End: 2024-09-17
Payer: MEDICARE

## 2024-09-17 ENCOUNTER — LAB (OUTPATIENT)
Dept: LAB | Facility: LAB | Age: 74
End: 2024-09-17
Payer: MEDICARE

## 2024-09-17 VITALS — HEART RATE: 51 BPM

## 2024-09-17 DIAGNOSIS — D64.9 ANEMIA, UNSPECIFIED TYPE: ICD-10-CM

## 2024-09-17 DIAGNOSIS — S76.111A STRAIN OF RIGHT QUADRICEPS MUSCLE, FASCIA AND TENDON, INITIAL ENCOUNTER: ICD-10-CM

## 2024-09-17 DIAGNOSIS — M25.061 HEMARTHROSIS, RIGHT KNEE: ICD-10-CM

## 2024-09-17 DIAGNOSIS — S76.111A QUADRICEPS TENDON RUPTURE, RIGHT, INITIAL ENCOUNTER: Primary | ICD-10-CM

## 2024-09-17 LAB
ALBUMIN SERPL BCP-MCNC: 4.1 G/DL (ref 3.4–5)
ALP SERPL-CCNC: 89 U/L (ref 33–136)
ALT SERPL W P-5'-P-CCNC: 10 U/L (ref 10–52)
ANION GAP SERPL CALC-SCNC: 7 MMOL/L (ref 10–20)
AST SERPL W P-5'-P-CCNC: 16 U/L (ref 9–39)
BASOPHILS # BLD AUTO: 0.04 X10*3/UL (ref 0–0.1)
BASOPHILS NFR BLD AUTO: 0.6 %
BILIRUB SERPL-MCNC: 0.7 MG/DL (ref 0–1.2)
BUN SERPL-MCNC: 16 MG/DL (ref 6–23)
CALCIUM SERPL-MCNC: 9.1 MG/DL (ref 8.6–10.3)
CHLORIDE SERPL-SCNC: 104 MMOL/L (ref 98–107)
CO2 SERPL-SCNC: 34 MMOL/L (ref 21–32)
CREAT SERPL-MCNC: 1.21 MG/DL (ref 0.5–1.3)
EGFRCR SERPLBLD CKD-EPI 2021: 62 ML/MIN/1.73M*2
EOSINOPHIL # BLD AUTO: 0.1 X10*3/UL (ref 0–0.4)
EOSINOPHIL NFR BLD AUTO: 1.4 %
ERYTHROCYTE [DISTWIDTH] IN BLOOD BY AUTOMATED COUNT: 12.7 % (ref 11.5–14.5)
GLUCOSE SERPL-MCNC: 102 MG/DL (ref 74–99)
HCT VFR BLD AUTO: 42.2 % (ref 41–52)
HGB BLD-MCNC: 14.1 G/DL (ref 13.5–17.5)
IMM GRANULOCYTES # BLD AUTO: 0.01 X10*3/UL (ref 0–0.5)
IMM GRANULOCYTES NFR BLD AUTO: 0.1 % (ref 0–0.9)
INR PPP: 1.1 (ref 0.9–1.1)
LYMPHOCYTES # BLD AUTO: 2.01 X10*3/UL (ref 0.8–3)
LYMPHOCYTES NFR BLD AUTO: 28.8 %
MCH RBC QN AUTO: 30.5 PG (ref 26–34)
MCHC RBC AUTO-ENTMCNC: 33.4 G/DL (ref 32–36)
MCV RBC AUTO: 91 FL (ref 80–100)
MONOCYTES # BLD AUTO: 0.56 X10*3/UL (ref 0.05–0.8)
MONOCYTES NFR BLD AUTO: 8 %
NEUTROPHILS # BLD AUTO: 4.27 X10*3/UL (ref 1.6–5.5)
NEUTROPHILS NFR BLD AUTO: 61.1 %
NRBC BLD-RTO: 0 /100 WBCS (ref 0–0)
PLATELET # BLD AUTO: 186 X10*3/UL (ref 150–450)
POTASSIUM SERPL-SCNC: 4.1 MMOL/L (ref 3.5–5.3)
PROT SERPL-MCNC: 6 G/DL (ref 6.4–8.2)
PROTHROMBIN TIME: 12.3 SECONDS (ref 9.8–12.8)
RBC # BLD AUTO: 4.62 X10*6/UL (ref 4.5–5.9)
SODIUM SERPL-SCNC: 141 MMOL/L (ref 136–145)
WBC # BLD AUTO: 7 X10*3/UL (ref 4.4–11.3)

## 2024-09-17 PROCEDURE — 36415 COLL VENOUS BLD VENIPUNCTURE: CPT

## 2024-09-17 PROCEDURE — 85025 COMPLETE CBC W/AUTO DIFF WBC: CPT

## 2024-09-17 PROCEDURE — 85610 PROTHROMBIN TIME: CPT

## 2024-09-17 PROCEDURE — 99215 OFFICE O/P EST HI 40 MIN: CPT | Performed by: ORTHOPAEDIC SURGERY

## 2024-09-17 PROCEDURE — 80053 COMPREHEN METABOLIC PANEL: CPT

## 2024-09-17 RX ORDER — OXYCODONE HYDROCHLORIDE 5 MG/1
5 TABLET ORAL EVERY 6 HOURS PRN
Qty: 28 TABLET | Refills: 0 | Status: SHIPPED | OUTPATIENT
Start: 2024-09-17 | End: 2024-09-24

## 2024-09-17 NOTE — PROGRESS NOTES
History of Present Illness   Right knee injury yesterday after falling while playing pickle ball.  He notes a fall directly onto the right knee.  He was seen by Dr. Terrazas who ordered an MRI he is here to go over the MRI and discuss treatment since of note he has a left quadriceps tendon repair done in 2023.     Review of Systems   GENERAL: Negative for malaise, significant weight loss, fever  MUSCULOSKELETAL: see HPI  NEURO:  Negative     Past Medical History:   Diagnosis Date    Left lower lobe pneumonia 08/14/2023    Other specified health status     No pertinent past medical history    Pneumonia of right upper lobe due to infectious organism 10/17/2023    Rupture of quadriceps tendon 08/14/2023    Sprain of left rotator cuff capsule 01/04/2016    Wheezing 10/01/2023        Medication Documentation Review Audit       Reviewed by Kayode Saavedra MA (Medical Assistant) on 09/16/24 at 0911      Medication Order Taking? Sig Documenting Provider Last Dose Status   Study AHEAD [id number] or placebo 967 mg in sodium chloride 0.9% 250 mL IV [id number]   Kenney Shipley MD  Active   Study AHEAD [id number] or placebo 969 mg in sodium chloride 0.9% 250 mL IV [id number]   Kenney Shipley MD  Active   Study AHEAD [id number] or placebo 969 mg in sodium chloride 0.9% 250 mL IV [id number]   Kenney Shipley MD  Active   Study AHEAD [id number] or placebo 978 mg in sodium chloride 0.9% 250 mL IV [id number]   Kenney Shipley MD  Active   Study AHEAD [id number] or placebo 990 mg in sodium chloride 0.9% 250 mL IV [id number]   Kenney Shipley MD  Active                     Physical Exam  This is a male in no acute distress  Right knee:  The skin is intact, there is no erythema or warmth  There is a knee effusion  There is a gap appreciated at the proximal pole of the patella  The extensor mechanism is disrupted with the patient unable to hold his leg up against gravity     Imaging  XR knee right 4+ views  Interpreted By:  Doc Degroot,    STUDY:  XR KNEE RIGHT 4+ VIEWS, 9/16/2024 9:28 am      INDICATION:  Signs/Symptoms:pain      ACCESSION NUMBER(S):  [id number]      ORDERING CLINICIAN:  DOC CRAIG      FINDINGS:  Right knee x-rays four views AP, lateral, tunnel and sunrise view: No  acute fractures, no dislocation. Mild-to-moderate tricompartmental  degenerative joint disease.          Signed by: Doc Craig 9/16/2024 6:54 PM  Dictation workstation:   [id number]  MR knee right wo IV contrast  Narrative: Interpreted By:  Rupert Vasquez,   STUDY:  MR KNEE RIGHT WO IV CONTRAST;      INDICATION:  Signs/Symptoms:PAIN.      COMPARISON:  Plain film radiographs earlier the same day right knee      ACCESSION NUMBER(S):  [id number]      ORDERING CLINICIAN:  DOC CRAIG      TECHNIQUE:  Routine multiplanar multisequential MRI right knee without contrast.      FINDINGS:  Lateral meniscus intact.      Complex tear medial meniscus with both horizontal and radial  components posterior horn.      Anterior cruciate ligament normal.      Posterior cruciate ligament normal.      Patellar tendon normal.      There is a high-grade tear of the quadriceps tendon at the patellar  insertion. A few of the lateral fibers look to be intact. There is no  large amount of distraction or sizable gap. Surrounding fluid noted.      Collateral ligaments normal.      Multiple small intra-articular loose bodies noted posteriorly,  largest an approximate 6 mm in size.      Tricompartmental osteoarthritis, mild. This is slightly more advanced  in the patellofemoral joint with extensive full-thickness chondral  loss from the medial facet.      No evidence of fracture. No marrow replacement process.      Impression: High-grade partial-thickness tear of the distal quadriceps tendon at  the patellar insertion with a few of the lateral fibers intact.      Complex tear posterior horn medial meniscus.      Mild tricompartmental osteoarthritis greatest in the  patellofemoral  joint with multiple intra-articular loose bodies.      Signed by: Rupert Vasquez 9/16/2024 5:58 PM  Dictation workstation:   [id number]       Assessment   Quadriceps tendon tear     Plan  Quadriceps tendon repair.  The procedure has been explained along with the risks, benefits alternatives being reviewed.  All questions were answered.  We will proceed with this tomorrow as an outpatient.    Allergies   Allergen Reactions    Bee Venom Protein (Honey Bee) Unknown    Poison Ivy Extract Unknown       REVIEW OF SYSTEMS  General: Negative for malaise, significant weight loss, fever  Musculoskeletal: See HPI  Neuro:  Negative for numbness/tingling      Medication Documentation Review Audit       Reviewed by Kayode Saavedra MA (Medical Assistant) on 09/16/24 at 0911      Medication Order Taking? Sig Documenting Provider Last Dose Status   Study AHEAD [id number] or placebo 967 mg in sodium chloride 0.9% 250 mL IV [id number]   Kenney Shipley MD  Active   Study AHEAD [id number] or placebo 969 mg in sodium chloride 0.9% 250 mL IV [id number]   Kenney Shipley MD  Active   Study AHEAD [id number] or placebo 969 mg in sodium chloride 0.9% 250 mL IV [id number]   Kenney Shipley MD  Active   Study AHEAD [id number] or placebo 978 mg in sodium chloride 0.9% 250 mL IV [id number]   Kenney Shipley MD  Active   Study AHEAD [id number] or placebo 990 mg in sodium chloride 0.9% 250 mL IV [id number]   Kenney Shipley MD  Active                    HPI:  Right knee pain, weakness and deformity after playing pickle ball and sustaining a fall directly on to the right knee.  Initially saw  who ordered a MRI, MRI confirms quad tendon tear at the patellar insertion along with posterior horn medial meniscus tear, underlying osteoarthritis and loose bodies.  He has a history of a left quadriceps tendon repair done by my team on 7/17/2023    PHYSICAL EXAM  General Appearance/Mental Status: A&Ox3, no apparent distress  HEENT: Normal  Lungs:  Clear  Heart: RRR  Abdomen: Soft, nontender  Extremities: Abnormal right knee, pain, weakness, palpable deformity in the quadriceps tendon and inability to straighten the right leg and hold against gravity    Assessment:  Right quadriceps tendon tear    Plan:  Plan right quadriceps tendon repair tomorrow at [facility].  Post operative pain script provided, OARRS reviewed.  Immobilizer and crutches  Follow up post operatively  Medications and allergies reviewed  All questions answered

## 2024-09-17 NOTE — PROGRESS NOTES
Patient presented to the office for a EKG ordered by Genaro Sweeney PA-C due to pre-op testing. EKG read in office by Dr.Scott BAY Rebollar MD, FACC. No orders given.     Marisol Strong MA

## 2024-09-18 PROCEDURE — 27385 REPAIR OF THIGH MUSCLE: CPT | Performed by: PHYSICIAN ASSISTANT

## 2024-09-18 PROCEDURE — 27385 REPAIR OF THIGH MUSCLE: CPT | Performed by: ORTHOPAEDIC SURGERY

## 2024-10-02 NOTE — PROGRESS NOTES
History of Present Illness   Right quadriceps tendon repair from 9/18/2024 he is doing well in his recovery.  He has been shutdown in the knee immobilizer since surgery.  He has minimal pain.     Review of Systems   GENERAL: Negative for malaise, significant weight loss, fever  MUSCULOSKELETAL: see HPI  NEURO:  Negative     Past Medical History:   Diagnosis Date    Left lower lobe pneumonia 08/14/2023    Other specified health status     No pertinent past medical history    Pneumonia of right upper lobe due to infectious organism 10/17/2023    Rupture of quadriceps tendon 08/14/2023    Sprain of left rotator cuff capsule 01/04/2016    Wheezing 10/01/2023        Medication Documentation Review Audit       Reviewed by Kayode Saavedra MA (Medical Assistant) on 09/16/24 at 0911      Medication Order Taking? Sig Documenting Provider Last Dose Status   Study AHEAD [id number] or placebo 967 mg in sodium chloride 0.9% 250 mL IV [id number]   Kenney Shipley MD  Active   Study AHEAD [id number] or placebo 969 mg in sodium chloride 0.9% 250 mL IV [id number]   Kenney Shipley MD  Active   Study AHEAD [id number] or placebo 969 mg in sodium chloride 0.9% 250 mL IV [id number]   Kenney Shipley MD  Active   Study AHEAD [id number] or placebo 978 mg in sodium chloride 0.9% 250 mL IV [id number]   Kenney Shipley MD  Active   Study AHEAD [id number] or placebo 990 mg in sodium chloride 0.9% 250 mL IV [id number]   Kenney Shipley MD  Active                     Physical Exam  Right lower extremity  Incision is healing well without any evidence of erythema ecchymosis or effusion  Mild tenderness to palpation along the joint line  He is able to initiate a straight leg raise  He is distally neurovascularly intact.     Imaging  XR knee right 4+ views  Interpreted By:  Doc Degroot,   STUDY:  XR KNEE RIGHT 4+ VIEWS, 9/16/2024 9:28 am      INDICATION:  Signs/Symptoms:pain      ACCESSION NUMBER(S):  [id number]      ORDERING CLINICIAN:  DOC  KIARA      FINDINGS:  Right knee x-rays four views AP, lateral, tunnel and sunrise view: No  acute fractures, no dislocation. Mild-to-moderate tricompartmental  degenerative joint disease.          Signed by: David Craig 9/16/2024 6:54 PM  Dictation workstation:   [id number]  MR knee right wo IV contrast  Narrative: Interpreted By:  Rupert Vasquez,   STUDY:  MR KNEE RIGHT WO IV CONTRAST;      INDICATION:  Signs/Symptoms:PAIN.      COMPARISON:  Plain film radiographs earlier the same day right knee      ACCESSION NUMBER(S):  [id number]      ORDERING CLINICIAN:  DAVID CRAIG      TECHNIQUE:  Routine multiplanar multisequential MRI right knee without contrast.      FINDINGS:  Lateral meniscus intact.      Complex tear medial meniscus with both horizontal and radial  components posterior horn.      Anterior cruciate ligament normal.      Posterior cruciate ligament normal.      Patellar tendon normal.      There is a high-grade tear of the quadriceps tendon at the patellar  insertion. A few of the lateral fibers look to be intact. There is no  large amount of distraction or sizable gap. Surrounding fluid noted.      Collateral ligaments normal.      Multiple small intra-articular loose bodies noted posteriorly,  largest an approximate 6 mm in size.      Tricompartmental osteoarthritis, mild. This is slightly more advanced  in the patellofemoral joint with extensive full-thickness chondral  loss from the medial facet.      No evidence of fracture. No marrow replacement process.      Impression: High-grade partial-thickness tear of the distal quadriceps tendon at  the patellar insertion with a few of the lateral fibers intact.      Complex tear posterior horn medial meniscus.      Mild tricompartmental osteoarthritis greatest in the patellofemoral  joint with multiple intra-articular loose bodies.      Signed by: Rupert Vasquez 9/16/2024 5:58 PM  Dictation workstation:   [id number]       Assessment   Status post  right quadriceps tendon repair     Plan  DC knee immobilizer, begin T scope brace set 0 to 30 degrees progress 10 degrees/week  Lock brace in extension while ambulating  Physical therapy  He asked about driving and I advised he cannot drive while he is wearing a knee brace.  Follow-up in 1 month, no XR needed   All questions answered

## 2024-10-03 ENCOUNTER — OFFICE VISIT (OUTPATIENT)
Dept: ORTHOPEDIC SURGERY | Facility: CLINIC | Age: 74
End: 2024-10-03
Payer: MEDICARE

## 2024-10-03 DIAGNOSIS — Z98.890 S/P ORIF (OPEN REDUCTION INTERNAL FIXATION) FRACTURE: Primary | ICD-10-CM

## 2024-10-03 DIAGNOSIS — Z87.81 S/P ORIF (OPEN REDUCTION INTERNAL FIXATION) FRACTURE: Primary | ICD-10-CM

## 2024-10-03 PROCEDURE — 99211 OFF/OP EST MAY X REQ PHY/QHP: CPT | Performed by: ORTHOPAEDIC SURGERY

## 2024-10-03 PROCEDURE — L1833 KO ADJ JNT POS R SUP PRE OTS: HCPCS | Performed by: ORTHOPAEDIC SURGERY

## 2024-10-04 DIAGNOSIS — Z00.6 CLINICAL TRIAL PARTICIPANT: Primary | ICD-10-CM

## 2024-10-07 ENCOUNTER — HOSPITAL ENCOUNTER (OUTPATIENT)
Dept: RESEARCH | Facility: HOSPITAL | Age: 74
Discharge: HOME | End: 2024-10-07
Payer: MEDICARE

## 2024-10-07 ENCOUNTER — HOSPITAL ENCOUNTER (OUTPATIENT)
Dept: RADIOLOGY | Facility: HOSPITAL | Age: 74
Discharge: HOME | End: 2024-10-07
Payer: MEDICARE

## 2024-10-07 VITALS
OXYGEN SATURATION: 100 % | WEIGHT: 205.69 LBS | DIASTOLIC BLOOD PRESSURE: 74 MMHG | TEMPERATURE: 97.3 F | RESPIRATION RATE: 16 BRPM | BODY MASS INDEX: 27.14 KG/M2 | HEART RATE: 52 BPM | SYSTOLIC BLOOD PRESSURE: 132 MMHG

## 2024-10-07 DIAGNOSIS — Z00.6 CLINICAL TRIAL PARTICIPANT: ICD-10-CM

## 2024-10-07 DIAGNOSIS — Z00.6 RESEARCH STUDY PATIENT: ICD-10-CM

## 2024-10-07 PROCEDURE — 70551 MRI BRAIN STEM W/O DYE: CPT

## 2024-10-07 PROCEDURE — 2560000001 HC RX 256 EXPERIMENTAL DRUGS: Performed by: PSYCHIATRY & NEUROLOGY

## 2024-10-07 ASSESSMENT — PAIN SCALES - GENERAL: PAINLEVEL: 2

## 2024-10-07 NOTE — RESEARCH NOTES
DCRU RESEARCH CLINICAL VISIT NOTE  Study Name: AHEAD 3-45 [id number]  IRB#: [id number]  [facility]#: [id number]  Protocol Version Dated: V9-06 Oct 2023  PI: Kenney Shipley  Time point: ALL OTHER INFUSION ONLY VISITS    Encounter Date: 10/07/2024  Encounter Time:  9:00 AM EDT  Encounter Department: [facility] HEMATOLOGY AND ONCOLOGY         Phone Marquise Roblero [phone]    Christin Boyce [phone]      Visit Provider: [facility] ROOM 2 [facility]   Jamar Keenan is a 74 y.o. male and is here for a Research clinical visit.    Study ID# [id number]    Allergies:   Allergies   Allergen Reactions    Bee Venom Protein (Honey Bee) Unknown    Poison Ivy Extract Unknown       Study Regimen and Dosing   A3-45 Trial-The study is a 216-week treatment, multicenter, double-blind, placebo-controlled, parallel-treatment arm study in subjects with preclinical AD and elevated amyloid (A45 Trial) and subjects with early preclinical AD and intermediate amyloid beta (A?i) (A3 Trial)   A-45 Trial:  [id number] placebo:  5 mg/kg IV Q2W through 8 weeks (titration), then 10 mg/kg IV Q2W through 96 weeks (induction), then 10 mg/kg IV Q4W through 216 weeks (maintenance).       Admission and Prior to Starting Study Activities   Obtain weight in kilograms - with shoes off & heavy items removed.  Verify with coordinator there is not a 10% change in weight from previous visits weight.   Insert one peripheral IV line for sample collection procedures (flush line with 5 - 10 mL normal saline following each blood draw).  Access mediport (if available) otherwise insert second peripheral line in opposite arm for Investigative drug administration (if peripheral line, flush line with 5 - 10 mL normal saline before & after infusion)Exceptions:        Criteria to Treat   DCRU RN reviewed and meets eligibility to proceed with treatment plan   Time team notified: 1021   DCRU RN notifies study team to  review eligibility and approval before dosing procedures  Time team approves: 1034      Dietary Guidelines   Regular Diet      Objective   Vital Signs:   Vitals:    10/07/24 0929 10/07/24 1043 10/07/24 1153 10/07/24 1221   BP: 120/74 120/69 128/74 132/74   Pulse: 56 54 59 52   Resp: 16 16 16 16   Temp: 36.6 °C (97.9 °F) 36.3 °C (97.3 °F) 36.3 °C (97.3 °F) 36.3 °C (97.3 °F)   TempSrc: Oral Oral Oral Oral   SpO2: 95% 96% 98% 100%   Weight: 93.3 kg (205 lb 11 oz)      PainSc:   2      PainLoc: Leg  Comment: right; recent surgery          Medications as of the completion of today's visit:  Administrations This Visit       Study AHEAD [id number] or placebo 966 mg in sodium chloride 0.9% 250 mL IV       Admin Date  10/07/2024 Action  New Bag Dose  966 mg Rate  250 mL/hr Route  intravenous Documented By  Dunia Magaña RN                    Orders placed during today's visit:  No orders of the defined types were placed in this encounter.      [id number] - AHEAD - A3 A45    Patient has no adverse events documented in the Research Adverse Events activity.      Study Specific Instructions and Documentation   If available enter a snapshot of performable actions in the order performed:  Premedication  Pre-Dose Vitals- use same arm for BP for all visits  Study drug infusion  30 min post infusion observation   Post-Dose Vital Signs- same arm for all visits       Weight-Based Dosing     Sponsor allows participant weight taken from previous VISIT to calculate dose unless there is a noted weight difference of 10% weight gain or loss      Reference weight (kg) provided by coordinator 96.6kg  Date measured:  unknown  Actual weight   Vitals:    10/07/24 0929   Weight: 93.3 kg (205 lb 11 oz)     Date Measured 10/07/2024     Safety Parameters and Special Instructions   [id number] is a monoclonal antibody.   Amyloid is a protein that builds up in the brains of people who have Alzheimer's disease (AD). This build-up of amyloid protein in the  brain is called “amyloid plaque” and may lead to impairment in memory and thinking.  [id number] binds to amyloid in the brain and has been shown to reduce the amount of this abnormal protein.   Side effects:   Infusion reactions (Flushing, skin rash, fever, chills, general body aches, feeling shaky, swelling tissues, muscle constrictions, shortness of breath), abnormally low blood pressure,  headache, vasogenic edema (build-up of fluid in the brain most often temporary), cerebral hemorrhages, diarrhea, nausea, and cough.        PRE-DOSE Medications   Document medication administration in MAR activity.   As ordered  Administer within 30 min prior to dosing       PRE-DOSE Vital Signs   Prior to dosing document following Vitals.  Semi-supine x 3 minutes before obtaining.   BP same arm for all visits  -see coordinator note for which arm to use  HR     Temperature     Respirations       Time Obtained:  1043 BP Arm: left   See predose vital signs in chart above and/or flowsheet     Infusion-Related Reactions   Review Safety Parameters on the medication Order. Follow [facility] Hypersensitivity Orders.       A-45 Dose Level    Medication Name   Titration: [id number] 5mg/kg or placebo IV every 2 weeks thru 8 weeks (Visits 6-9) then   Induction: [id number] 10mg/kg or placebo IV every 2 weeks (Visits 10-53) thru 96 weeks then   Maintenance: [id number] 10mg/kg or placebo IV every 4 weeks (Visits 54-84) thru 216 weeks     BAN2401-Placebo Research Drug Administration   Document medication administration in MAR activity. Research specific instructions below.   Use 0.2 micron in-line filter.    Administer dose thru NON-DEHP Pathway (non-DEHP Tubing and Bag).  IDS (Investigational Drug Services) will prime filter & tubing with active drug. Infuse over approximately 60 minutes   BUD (Beyond Use Date): 24 hours at room temperature.  As IV bag empties, flush line using 50 mL NaCl 0.9% in PhaSeal® syringe to ensure entire dose is given.    Infusion  Reactions:  Use [facility] orders. If the infusion reaction improves or resolves, infusion may be resumed if the investigator considers it safe to do so in his/her clinical judgment. If so, then resume the infusion at 50% of the prior rate once the infusion reaction has resolved; the infusion duration should not exceed 2 hours.    Observation time  30 min after the end of the infusion      BAN or placebo administration   Begin Infusion 1048 RN SD   End Infusion 1151 RN SD     POST-DOSE Vital Signs x 2   Document following Vitals at the time points listed below.    Semi-supine x 3 minutes before obtaining.   BP same arm for all visits  -see coordinator note for which arm to use  HR     Temperature     Respirations       EOI   Time Obtained: 1153        BP arm left   + 30 MIN EOI    Time Obtained: 1221        BP arm left   See EOI and +30 min EOI vital signs in chart above and/or flowsheets    30 min POST-DOSE Observation   Post Dose Observation End Time 1221     Discharge Instructions   Patient may be discharged to home after observations is complete.   Remind patient to return for next study visit   Discharge time:  5763   9-6-2024 nina Good RN  10/07/24

## 2024-10-20 NOTE — PROGRESS NOTES
"Physical Therapy    Physical Therapy Evaluation and Treatment      Patient Name: Wiliam Keenan \"Jamar\"  MRN: [id number]  Today's Date: 10/21/2024    Time Entry:   Time Calculation  Start Time: 1438  Stop Time: 1510  Time Calculation (min): 32 min  PT Evaluation Time Entry  PT Evaluation (Low) Time Entry: 27  PT Therapeutic Procedures Time Entry  Therapeutic Exercise Time Entry: 5                 Insurance:  Medicare/[employer]uth  BMN V  PA not req  Visit: 1  POC: 14    Assessment:  75 y/o M presents to outpatient physical therapy with reports of right knee pain approximately 4 1/2 weeks s/p R quadriceps tendon repair. The patient presents with the current impairments of pain, decreased ROM, weakness, impaired mobility, and increased muscular tension. These impairments currently limit their ability to stand for extended periods of time, ambulate, negotiate stairs, and participate in recreation activities. Due to the limitations listed above, the patient is currently at a decreased functional level compared to baseline, and they would benefit from skilled physical therapy to improve pain intensity, strength, flexibility, mobility, and facilitate a safe and efficient return to functional baseline. Patient's prognosis for improvement with therapy is good at this time.  PT Assessment  PT Assessment Results: Decreased range of motion, Decreased strength, Decreased mobility, Pain  Rehab Prognosis: Good  Evaluation/Treatment Tolerance: Patient tolerated treatment well     Plan:  OP PT Plan  Treatment/Interventions: Aquatic therapy, Cryotherapy, Education/ Instruction, Electrical stimulation, Hot pack, Manual therapy, Neuromuscular re-education, Self care/ home management, Taping techniques, Therapeutic activities, Therapeutic exercises, Vasopneumatic device  PT Plan: Skilled PT  PT Frequency:  (1-2x/week)  Duration: 13 additional visits  Onset Date: 09/18/24  Certification Period Start Date: 10/21/24  Certification Period End Date: " 01/19/25  Rehab Potential: Good  Plan of Care Agreement: Patient      Complexity:  Low    Current Problem:   1. Rupture of right quadriceps tendon, subsequent encounter  Referral to Physical Therapy    Follow Up In Physical Therapy          Subjective   Patient known to this facility reports to OPPT with complaints of R knee pain following a fall on 9/16/24 resulting in a quadriceps rupture. He had surgery to repair it on 9/18/24 with Dr. Brambila. Since surgery he has been feeling good for the most part. Pain at this time is a 0/10, 3/10 at worst, 0/10 at best. Increased pain with walking for extended periods of time, transfers, stairs, and sleeping. He is ambulating with the T scope brace at this time, but has been able to bend it with less difficulty compared to last time. Denies numbness and tingling.   General:  General  Reason for Referral: R knee pain s/p quadriceps tendon repair 9/18/24  Referred By: Genaro Sweeney PA-C  Past Medical History Relevant to Rehab: Pt denies significant PMH  Precautions:  Precautions  STEADI Fall Risk Score (The score of 4 or more indicates an increased risk of falling): 3  Precautions Comment: Per protocol  Pain:  Pain Assessment  Pain Assessment: 0-10  0-10 (Numeric) Pain Score: 0 - No pain  Pain Type: Surgical pain  Pain Location: Knee  Pain Orientation: Right  Pain Descriptors: Sore, Aching  Home Living:  Home Living  Home Living Comment: [place] home with 8 steps to the basement  Prior Level of Function:  Prior Function Per Pt/Caregiver Report  Prior Function Comments: Independent in all ADLs and desired activities    Protocol:    REHABILITATION FOLLOWING UNILATERAL QUADRICEPS TENDON REPAIR    I. Immediate Postoperative Phase (Days 1-7)  Goals:  Restore full passive knee extension Diminish pain and joint swelling Restore patellar mobility  Initiate early controlled mobility  *Controlled forces on repair site    Brace: T-Scope brace locked at 0 degrees extension with compression  wrap. Sleep and ambulate in brace.    ROM: No ROM    Exercises:  Ankle pumps   Quad Sets  Patellar mobilizations   Hip ABD/ADD  SLR (assisted)    Ice and elevation: 20 minutes of each hour and elevation    Sleep and ambulate in brace locked   Weightbearing: Locked brace with crutches, continue WBAT ROM: No ROM    Initiate gravity eliminate SLR (assisted)    Continue ice and elevation    II.  Maximum Protection Phase (Weeks 2-6)  Goals:  Control forces on healing   Gradually increase knee flexion   Restore full PROM  Restore patellar mobility   Retard muscle atrophy  Week 2:    Brace: Continue use of locked brace (4-6 weeks) Sleep in brace (4-6 weeks)    Weightbearing: Weightbearing as tolerated    ROM: PROM knee flexion only 0-30 degrees Full passive knee extension  Patellar mobilization    Exercises:  Stim to quads   Quad sets   Ankle pumps   Hip ABD/ADD  Gravity eliminated SLR flexion      Week 3:  Continue above mentioned.  Continue use of crutches and WB to 75-80% body weight     Week 4:  Continue all exercises listed above.     Weightbearing: Weightbearing as tolerated    ROM: PROM knee flexion only 0-60 degrees    Exercises:  Initiate weight shifting Initiate proprioception drills    Week  5-6:  Brace: Unlock brace for ambulation at week 6    Weightbearing: Discontinue use of crutches at 5 weeks post op    ROM: PROM 0-60 degrees    Exercises:  Initiate pool program  Active knee extension 90-30 degrees  Multi-angle isometrics knee extension (sub max) Mini- squats  Continue all exercises listed above    Objective   4 weeks, 5 days post op    Knee AROM (*indicates pain):  Flexion R NT, L 126  Extension R +12, L 0    Knee PROM (*indicates pain):   Flexion R 52, L NT  Extension R +14, L +2     LE strength (*indicates pain):  Knee flexion R NT/5, L 4+/5  Knee extension R NT/5, L 4+/5  Hip flexion R 4-/5, L 4/5  Hip abd R 4/5, L 4/5    Dermatomes: intact    Gait: amb with knee locking brace, decreased stance time,  decreased polo, decreased knee flexion during stance    Palpation: no significant tenderness to palpation noted throughout the right lower extremity    Sit to stand: with bilateral upper extremity support, increased time to complete    Edema: mild swelling noted in the medial joint line on the right    Observation: incision appears dry and healing well, without obvious signs of infection    Homans: negative    Outcome Measures:  Other Measures  Lower Extremity Funtional Score (LEFS): 54/80     Treatments:  HEP reviewed    EDUCATION:  Outpatient Education  Individual(s) Educated: Patient  Education Provided: Anatomy, Body Mechanics, Home Exercise Program, Physiology, POC, Post-Op Precautions, Signs/Symptoms of Infection  Risk and Benefits Discussed with Patient/Caregiver/Other: yes  Patient/Caregiver Demonstrated Understanding: yes  Plan of Care Discussed and Agreed Upon: yes  Patient Response to Education: Patient/Caregiver Verbalized Understanding of Information, Patient/Caregiver Asked Appropriate Questions  HEP:  Exercises  - Supine Quad Set  - 2 x daily - 7 x weekly - 2 sets - 10 reps - 5 sec hold  - Seated Knee Extension Stretch with Chair  - 2 x daily - 7 x weekly - 1 sets - 1 reps - 3-5 min hold  - Supine Single Leg Ankle Pumps  - 2 x daily - 7 x weekly - 2 sets - 10 reps  Goals:  By discharge:  1. Patient will report and demonstrate independence with current HEP  2. Patient will demonstrate AROM of the R knee 0-120 to improve their ability to ambulate, negotiate stairs, and squat without restriction  3. Patient will demonstrate the ability to ascend/descend one flight of stairs reciprocally and without an increase in pain to improve function within the home and the community  4. Patient will demonstrate gross hip and knee strength of >/= 4+/5 to improve the ability to ambulate, squat, and lift without restrictions  5. Patient will demonstrate an increase in Lower Extremity Functional Scale score by 8  points to 62/80 to meet established MCID (baseline 10/21/24 54/80)  6. Patient will demonstrate the ability to perform 15 bodyweight squats while maintaining proper hip and knee mechanics and without pain in the knee exceeding 2/10  7. Patient will report pain of 0/10 at rest, and no greater than 2/10 with any activity including standing, walking, stairs, and squatting  8. Patient will demonstrate right quadriceps and hamstring strength >/= 85% of the contralateral side measured with a handheld dynamometer to indicate improved functional strength and stability of the knee and indicate potential return to recreation activities   9. Patient will demonstrate the ability to ambulate >/= 150' outside of the brace, and without any major deviations in gait to indicate improved mobility within the home and the community   10. Patient will demonstrate the ability to perform 15 forward and 15 backward lunges while maintaining proper hip and knee mechanics and without an increase in pain to indicate improved functional strength of the right lower extremity

## 2024-10-21 ENCOUNTER — EVALUATION (OUTPATIENT)
Dept: PHYSICAL THERAPY | Facility: CLINIC | Age: 74
End: 2024-10-21
Payer: MEDICARE

## 2024-10-21 DIAGNOSIS — S76.111D RUPTURE OF RIGHT QUADRICEPS TENDON, SUBSEQUENT ENCOUNTER: ICD-10-CM

## 2024-10-21 PROCEDURE — 97161 PT EVAL LOW COMPLEX 20 MIN: CPT | Mod: GP | Performed by: PHYSICAL THERAPIST

## 2024-10-21 ASSESSMENT — ENCOUNTER SYMPTOMS
OCCASIONAL FEELINGS OF UNSTEADINESS: 0
DEPRESSION: 0
LOSS OF SENSATION IN FEET: 0

## 2024-10-21 ASSESSMENT — PAIN DESCRIPTION - DESCRIPTORS: DESCRIPTORS: SORE;ACHING

## 2024-10-21 ASSESSMENT — PAIN - FUNCTIONAL ASSESSMENT: PAIN_FUNCTIONAL_ASSESSMENT: 0-10

## 2024-10-21 ASSESSMENT — PAIN SCALES - GENERAL: PAINLEVEL_OUTOF10: 0 - NO PAIN

## 2024-10-22 DIAGNOSIS — Z00.6 RESEARCH STUDY PATIENT: Primary | ICD-10-CM

## 2024-10-23 ENCOUNTER — TREATMENT (OUTPATIENT)
Dept: PHYSICAL THERAPY | Facility: CLINIC | Age: 74
End: 2024-10-23
Payer: MEDICARE

## 2024-10-23 DIAGNOSIS — S76.111D RUPTURE OF RIGHT QUADRICEPS TENDON, SUBSEQUENT ENCOUNTER: Primary | ICD-10-CM

## 2024-10-23 PROCEDURE — 97110 THERAPEUTIC EXERCISES: CPT | Mod: GP | Performed by: PHYSICAL THERAPIST

## 2024-10-23 ASSESSMENT — PAIN SCALES - GENERAL: PAINLEVEL_OUTOF10: 0 - NO PAIN

## 2024-10-23 ASSESSMENT — PAIN - FUNCTIONAL ASSESSMENT: PAIN_FUNCTIONAL_ASSESSMENT: 0-10

## 2024-10-23 NOTE — PROGRESS NOTES
Physical Therapy    Physical Therapy Treatment    Patient Name: Wiliam Keenan  MRN: [id number]  Today's Date: 10/23/2024    Time Entry:   Time Calculation  Start Time: 1608  Stop Time: 1651  Time Calculation (min): 43 min     PT Therapeutic Procedures Time Entry  Therapeutic Exercise Time Entry: 41                 Insurance:  Medicare/[employer]  BMN V  PA not rethomas  Visit: 2  POC: 14    Assessment:  Therapeutic exercises performed this date target improving flexibility and strength of the lower extremity within pain and protocol restrictions. Patient denies any increase in pain in the right lower extremity throughout treatment session. Improvements in both knee flexion and extension are found this date. PT introduces NMES for increasing quad strength and activation. Patient is progressing well at this time, and continues to require additional skilled PT.   PT Assessment  PT Assessment Results: Decreased range of motion, Decreased strength, Decreased mobility, Pain  Rehab Prognosis: Good  Plan:  OP PT Plan  PT Plan: Skilled PT  Rehab Potential: Good  Plan of Care Agreement: Patient  Continue to progress strength and stability of the lower extremity as tolerated    Current Problem  1. Rupture of right quadriceps tendon, subsequent encounter            General     General  Reason for Referral: R knee pain s/p quadriceps tendon repair 9/18/24  Referred By: Genaro Sweeney PA-C  Past Medical History Relevant to Rehab: Pt denies significant PMH    Subjective    Patient reports that he has been feeling good since his last appointment. Has been complaint with his HEP and denies pain.     Precautions  Precautions  Precautions Comment: Per protocol  Pain  Pain Assessment  Pain Assessment: 0-10  0-10 (Numeric) Pain Score: 0 - No pain  Pain Type: Surgical pain  Pain Location: Knee  Pain Orientation: Right    Objective     Sx 9/18/24: 5 weeks post op    Per physician note: brace locked 0-30, progress 10 degrees per week    PROM R knee  flexion 62   PROM R knee extension 9  AROM R knee extension 6        Protocol:  REHABILITATION FOLLOWING UNILATERAL QUADRICEPS TENDON REPAIR    I. Immediate Postoperative Phase (Days 1-7)  Goals:  Restore full passive knee extension Diminish pain and joint swelling Restore patellar mobility  Initiate early controlled mobility  *Controlled forces on repair site    Brace: T-Scope brace locked at 0 degrees extension with compression wrap. Sleep and ambulate in brace.    ROM: No ROM    Exercises:  Ankle pumps   Quad Sets  Patellar mobilizations   Hip ABD/ADD  SLR (assisted)    Ice and elevation: 20 minutes of each hour and elevation    Sleep and ambulate in brace locked   Weightbearing: Locked brace with crutches, continue WBAT ROM: No ROM    Initiate gravity eliminate SLR (assisted)    Continue ice and elevation    II.  Maximum Protection Phase (Weeks 2-6)  Goals:  Control forces on healing   Gradually increase knee flexion   Restore full PROM  Restore patellar mobility   Retard muscle atrophy  Week 2:    Brace: Continue use of locked brace (4-6 weeks) Sleep in brace (4-6 weeks)    Weightbearing: Weightbearing as tolerated    ROM: PROM knee flexion only 0-30 degrees Full passive knee extension  Patellar mobilization    Exercises:  Stim to quads   Quad sets   Ankle pumps   Hip ABD/ADD  Gravity eliminated SLR flexion      Week 3:  Continue above mentioned.  Continue use of crutches and WB to 75-80% body weight     Week 4:  Continue all exercises listed above.     Weightbearing: Weightbearing as tolerated    ROM: PROM knee flexion only 0-60 degrees    Exercises:  Initiate weight shifting Initiate proprioception drills    Week  5-6:  Brace: Unlock brace for ambulation at week 6    Weightbearing: Discontinue use of crutches at 5 weeks post op    ROM: PROM 0-60 degrees    Exercises:  Initiate pool program  Active knee extension 90-30 degrees  Multi-angle isometrics knee extension (sub max) Mini- squats  Continue all  "exercises listed above      Treatments:  Therapeutic exercises:   PROM heel slides 0-60 (in brace) (therapist assisted)  NMES with quad set x10'  Supine ball squeeze 20x5\"  SL hip abduction (brace on and locked) 2x10  Weight shifting lateral (brace locked) 15x3\" ea   Standing 3 way hip OKC 2x10 ea   HR/TR x15 ea   HS stretch with stool 3x30\"   (41')    NMES PPR 1 10 on/10 off intensity: to tolerance, paired with quad sets (billed as TE)        OP EDUCATION:     10/21/24:  Exercises  - Supine Quad Set  - 2 x daily - 7 x weekly - 2 sets - 10 reps - 5 sec hold  - Seated Knee Extension Stretch with Chair  - 2 x daily - 7 x weekly - 1 sets - 1 reps - 3-5 min hold  - Supine Single Leg Ankle Pumps  - 2 x daily - 7 x weekly - 2 sets - 10 reps  Goals:  By discharge:  1. Patient will report and demonstrate independence with current HEP  2. Patient will demonstrate AROM of the R knee 0-120 to improve their ability to ambulate, negotiate stairs, and squat without restriction  3. Patient will demonstrate the ability to ascend/descend one flight of stairs reciprocally and without an increase in pain to improve function within the home and the community  4. Patient will demonstrate gross hip and knee strength of >/= 4+/5 to improve the ability to ambulate, squat, and lift without restrictions  5. Patient will demonstrate an increase in Lower Extremity Functional Scale score by 8 points to 62/80 to meet established MCID (baseline 10/21/24 54/80)  6. Patient will demonstrate the ability to perform 15 bodyweight squats while maintaining proper hip and knee mechanics and without pain in the knee exceeding 2/10  7. Patient will report pain of 0/10 at rest, and no greater than 2/10 with any activity including standing, walking, stairs, and squatting  8. Patient will demonstrate right quadriceps and hamstring strength >/= 85% of the contralateral side measured with a handheld dynamometer to indicate improved functional strength and " stability of the knee and indicate potential return to recreation activities   9. Patient will demonstrate the ability to ambulate >/= 150' outside of the brace, and without any major deviations in gait to indicate improved mobility within the home and the community   10. Patient will demonstrate the ability to perform 15 forward and 15 backward lunges while maintaining proper hip and knee mechanics and without an increase in pain to indicate improved functional strength of the right lower extremity

## 2024-10-24 ENCOUNTER — APPOINTMENT (OUTPATIENT)
Dept: PHYSICAL THERAPY | Facility: CLINIC | Age: 74
End: 2024-10-24
Payer: MEDICARE

## 2024-10-30 ENCOUNTER — TREATMENT (OUTPATIENT)
Dept: PHYSICAL THERAPY | Facility: CLINIC | Age: 74
End: 2024-10-30
Payer: MEDICARE

## 2024-10-30 DIAGNOSIS — S76.111D RUPTURE OF RIGHT QUADRICEPS TENDON, SUBSEQUENT ENCOUNTER: Primary | ICD-10-CM

## 2024-10-30 PROBLEM — S76.111A RUPTURE OF RIGHT QUADRICEPS TENDON: Status: ACTIVE | Noted: 2023-10-02

## 2024-10-30 PROCEDURE — 97110 THERAPEUTIC EXERCISES: CPT | Mod: GP,CQ

## 2024-10-30 ASSESSMENT — PAIN SCALES - GENERAL: PAINLEVEL_OUTOF10: 0 - NO PAIN

## 2024-10-30 ASSESSMENT — PAIN - FUNCTIONAL ASSESSMENT: PAIN_FUNCTIONAL_ASSESSMENT: 0-10

## 2024-11-01 ENCOUNTER — APPOINTMENT (OUTPATIENT)
Dept: ORTHOPEDIC SURGERY | Facility: CLINIC | Age: 74
End: 2024-11-01
Payer: MEDICARE

## 2024-11-01 DIAGNOSIS — Z00.6 CLINICAL TRIAL PARTICIPANT: Primary | ICD-10-CM

## 2024-11-04 ENCOUNTER — HOSPITAL ENCOUNTER (OUTPATIENT)
Dept: RESEARCH | Facility: HOSPITAL | Age: 74
Discharge: HOME | End: 2024-11-04
Payer: MEDICARE

## 2024-11-04 VITALS
RESPIRATION RATE: 16 BRPM | WEIGHT: 207.67 LBS | SYSTOLIC BLOOD PRESSURE: 129 MMHG | OXYGEN SATURATION: 96 % | TEMPERATURE: 97.5 F | BODY MASS INDEX: 27.4 KG/M2 | HEART RATE: 54 BPM | DIASTOLIC BLOOD PRESSURE: 73 MMHG

## 2024-11-04 DIAGNOSIS — Z00.6 CLINICAL TRIAL PARTICIPANT: ICD-10-CM

## 2024-11-04 LAB
HOLD SPECIMEN: NORMAL

## 2024-11-04 PROCEDURE — 2560000001 HC RX 256 EXPERIMENTAL DRUGS: Performed by: PSYCHIATRY & NEUROLOGY

## 2024-11-04 PROCEDURE — 36415 COLL VENOUS BLD VENIPUNCTURE: CPT

## 2024-11-04 PROCEDURE — 99213 OFFICE O/P EST LOW 20 MIN: CPT | Performed by: PSYCHIATRY & NEUROLOGY

## 2024-11-04 PROCEDURE — 96365 THER/PROPH/DIAG IV INF INIT: CPT

## 2024-11-04 ASSESSMENT — PAIN SCALES - GENERAL: PAINLEVEL_OUTOF10: 0-NO PAIN

## 2024-11-04 NOTE — RESEARCH NOTES
[facility] RESEARCH CLINICAL VISIT NOTE  Study Name: AHEAD 3-45 [id number]  IRB#: [id number]  [facility]#: [id number]  Protocol Version Dated: V9-06 Oct 2023  PI: Kenney Shipley    Time point: A45 Visit 18 Week 24, A45 Visit 30 Week 48, A45 Visit 54 Week 96, A45 Visit 60 Week 120, A45 Visit 72 Week 168, A45 Visit 84 Week 216    Visit 72 Week 168    Encounter Date: 11/04/2024  Encounter Time: 11:30 AM EST  Encounter Department: [place] [address] RESEARCH         Phone Marquise Roblero [phone]    Christin Boyce [phone]      Visit Provider: [facility], [facility] ROOM 12 [facility]   Jamar Keenan is a 74 y.o. male and is here for a Research clinical visit.    Study ID# [id number]    Allergies:   Allergies   Allergen Reactions    Bee Venom Protein (Honey Bee) Unknown    Poison Ivy Extract Unknown       Study Regimen and Dosing   A3-45 Trial-The study is a 216-week treatment, multicenter, double-blind, placebo-controlled, parallel-treatment arm study in subjects with preclinical AD and elevated amyloid (A45 Trial) and subjects with early preclinical AD and intermediate amyloid beta (A?i) (A3 Trial)   A-45 Trial:  [id number] placebo:  5 mg/kg IV Q2W through 8 weeks (titration), then 10 mg/kg IV Q2W through 96 weeks (induction), then 10 mg/kg IV Q4W through 216 weeks (maintenance).       Admission and Prior to Starting Study Activities   Obtain weight in kilograms - with shoes off & heavy items removed.  Verify with coordinator there is not a 10% change in weight from previous visits weight.  Confirm Physical & Neurological Exam completed by PI.   Study Questionnaires - Coordinator will obtain prior to dosing.  Insert one peripheral IV line for sample collection procedures (flush line with 5 - 10 mL normal saline following each blood draw).  Access mediport (if available) otherwise insert second peripheral line in opposite arm for Investigative drug administration (if  peripheral line, flush line with 5 - 10 mL normal saline before & after infusion)Exceptions:      Criteria to Treat   DCRU RN reviewed and meets eligibility to proceed with treatment plan   Time team notified: WENDI   DCRU RN notifies study team to review eligibility and approval before dosing procedures  Time team approves: 1300      Dietary Guidelines   Regular Diet      Objective   Vital Signs:   Vitals:    11/04/24 1136 11/04/24 1358 11/04/24 1514 11/04/24 1542   BP: 114/70 118/70 124/67 129/73   Pulse: 66 51 53 54   Resp: 16 16 16 16   Temp: 36.4 °C (97.5 °F) 36.5 °C (97.7 °F) 36.6 °C (97.9 °F) 36.4 °C (97.5 °F)   TempSrc: Oral Oral Oral Oral   SpO2: 94% 95% 96% 96%   Weight: 94.2 kg (207 lb 10.8 oz)      PainSc: 0-No pain          Medications as of the completion of today's visit:  Administrations This Visit       Study AHEAD [id number] or placebo 933 mg in sodium chloride 0.9% 250 mL IV       Admin Date  11/04/2024 Action  New Bag Dose  933 mg Rate  250 mL/hr Route  intravenous Documented By  Dunia Magaña RN                    Orders placed during today's visit:  Orders Placed This Encounter   Procedures    Research collection: Urine, 30mL - Clinical Collect Research Urinalysis; Ambient     Ambient     Standing Status:   Standing     Number of Occurrences:   1     Order Specific Question:   Test     Answer:   Research Urinalysis     Order Specific Question:   Temperature     Answer:   Ambient    Research collection: 4mL Red Top No Additive - Clinical Collect Chemistry; Ambient; 5x     Ambient, Invert 5X     Standing Status:   Standing     Number of Occurrences:   1     Order Specific Question:   Test     Answer:   Chemistry     Order Specific Question:   Temperature     Answer:   Ambient     Order Specific Question:   Invert     Answer:   5x    Research collection: 2mL Lavender K2 EDTA - Clinical Collect Hematology; Ambient; Other (10x)     Ambient, Invert 10X     Standing Status:   Standing     Number of  Occurrences:   1     Order Specific Question:   Test     Answer:   Hematology     Order Specific Question:   Temperature     Answer:   Ambient     Order Specific Question:   Invert     Answer:   Other     Comments:   10x    Research collection: 10mL Lavender K2 EDTA - Clinical Collect PD; Ambient; Other (10x)     Ambient, Invert 10X     Standing Status:   Standing     Number of Occurrences:   1     Order Specific Question:   Test     Answer:   PD     Order Specific Question:   Temperature     Answer:   Ambient     Order Specific Question:   Invert     Answer:   Other     Comments:   10x    Research collection: 10mL Lavender K2 EDTA - Clinical Collect PD; Ambient; Other (10x)     Ambient, Invert 10X     Standing Status:   Standing     Number of Occurrences:   1     Order Specific Question:   Test     Answer:   PD     Order Specific Question:   Temperature     Answer:   Ambient     Order Specific Question:   Invert     Answer:   Other     Comments:   10x    Research collection: 4mL Red Top No Additive - Clinical Collect ADA; Ambient; 5x     Ambient, Invert 5X     Standing Status:   Standing     Number of Occurrences:   1     Order Specific Question:   Test     Answer:   ADA     Order Specific Question:   Temperature     Answer:   Ambient     Order Specific Question:   Invert     Answer:   5x    Adult diet Regular     Standing Status:   Standing     Number of Occurrences:   1     Order Specific Question:   Diet type     Answer:   Regular       [id number] - AHEAD - A3 A45    Patient has no adverse events documented in the Research Adverse Events activity.      Study Specific Instructions and Documentation   Pre-Dose urine collection and POCT pregnancy test  Premedication  Pre-Dose ECG- MD must read and sign prior to dosing  Pre-Dose Vitals- use same arm for BP for all visits  Pre-Dose Research Labs  Study drug infusion  30 min post infusion observation   Post-Dose Vital Signs- same arm for all visits  VISIT 18 Week 24  ONLY Post-Dose Research Labs- Any time post EOI       Weight-Based Dosing     Sponsor allows participant weight taken from previous VISIT to calculate dose unless there is a noted weight difference of 10% weight gain or loss      Reference weight (kg) provided by coordinator NA  Date measured NA  Actual weight   Vitals:    11/04/24 1136   Weight: 94.2 kg (207 lb 10.8 oz)     Date Measured 11/04/2024         Safety Parameters and Special Instructions   [id number] is a monoclonal antibody.   Amyloid is a protein that builds up in the brains of people who have Alzheimer's disease (AD). This build-up of amyloid protein in the brain is called “amyloid plaque” and may lead to impairment in memory and thinking.  [id number] binds to amyloid in the brain and has been shown to reduce the amount of this abnormal protein.   Side effects:   Infusion reactions (Flushing, skin rash, fever, chills, general body aches, feeling shaky, swelling tissues, muscle constrictions, shortness of breath), abnormally low blood pressure,  headache, vasogenic edema (build-up of fluid in the brain most often temporary), cerebral hemorrhages, diarrhea, nausea, and cough.        PRE-DOSE Urine Collection     Test Volume Tube Handling Collection Time   Research Urinalysis 25 mL Collection Cup Ambient- to DCRU Lab 1343   POCT Pregnancy Test If positive, no testing, call coordinator   POCT Pregnancy Results Control NA Result NA Lot# NA Exp. Date NA     PRE-DOSE Medications   Document medication administration in MAR activity.   As ordered  Administer within 30 min prior to dosing       PRE-DOSE ECG   Before Research Labs  Prior to dosing  Single  Time supine at least 5 min prior  Study Specific ECG Machine  MD/Provider must review & sign before dosing   QTcF calculation using Fridericia's formula  If QTcF is greater than 450ms, obtain 2 additional ECG's with QtcF Calculation and notify covering MD and    ECG #1 QTcF ECG #2 QTcF ECG #3 QTcF    418          Time Point   Time Completed   Pre Dose ECG 1253   ECG 2- if QTcF is greater than 450ms    ECG 3- if QTcF is greater than 450ms      PRE-DOSE Vital Signs   Prior to dosing document following Vitals.  Semi-supine x 3 minutes before obtaining.   BP same arm for all visits  -see coordinator note for which arm to use  HR     Temperature     Respirations       Time Obtained: 1358               BP arm: left   See pre-dose vital signs in chart above and/or flowsheet    PRE-DOSE Research Correlatives:     Refer orders placed by   Access Type: #22g PIV  Location: Right antecubital   Time point Specimen Test Volume Tube Handling Draw Time      All visits     Chemistry 4 mL Red Top Serum Ambient, Invert 5X       1402    Hematology 2 mL EDTA Ambient, Invert 10X     PK,ADA 10 mL Red Top Serum Ambient, Invert 5X    V18 Wk 24, V30 Wk 48, V60 Wk 120, V72 Wk 168, PD Biomarkers 2 x 10 mL EDTA Ambient, Invert 10X    V54 Wk 96, V84 Wk 216 only  PD Biomarkers 5 x 10 mL EDTA Ambient, Invert 10X         Infusion-Related Reactions   Review Safety Parameters on the medication Order. Follow  Hypersensitivity Orders.       A-45 Dose Level    Medication Name   Titration: [id number] 5mg/kg or placebo IV every 2 weeks thru 8 weeks (Visits 6-9) then   Induction: [id number] 10mg/kg or placebo IV every 2 weeks (Visits 10-53) thru 96 weeks then   Maintenance: [id number] 10mg/kg or placebo IV every 4 weeks (Visits 54-84) thru 216 weeks     Research Drug Administration   Document medication administration in MAR activity. Research specific instructions below.  Administer [id number] or placebo  IV in 250 mL NaCl 0.9%(Final Volume).     Use 0.2 micron in-line filter.    Administer dose thru NON-DEHP Pathway (non-DEHP Tubing and Bag).  IDS (Investigational Drug Services) will prime filter & tubing with active drug. Infuse over approximately 60 minutes   BUD (Beyond Use Date): 24 hours at room temperature.  As IV bag empties, flush  line using 50 mL NaCl 0.9% in PhaSeal® syringe to ensure entire dose is given.    Infusion Reactions:  Use [facility] orders. If the infusion reaction improves or resolves, infusion may be resumed if the investigator considers it safe to do so in his/her clinical judgment. If so, then resume the infusion at 50% of the prior rate once the infusion reaction has resolved; the infusion duration should not exceed 2 hours.    Observation  30 min after the end of the infusion      BAN or placebo administration   Begin Infusion 1412 RN SD   End Infusion 1512             RN SD     POST-DOSE Vital Signs x 2   Document following Vitals at the time points listed below.    Semi-supine x 3 minutes before obtaining.   BP same arm for all visits  -see coordinator note for which arm to use  HR     Temperature     Respirations       EOI Time “0” Time Obtained: 1514                 BP arm: left   + 30 min  EOI Time Obtained: 1542                 BP arm: left   See post-dose vital signs in chart above and/or flowsheet    POST-DOSE Observation   Post Dose Observation End Time 1542     POST-DOSE Research Correlatives: VISIT 18 Week 24 ONLY     Refer orders placed by    Access Type: NA Location: NA   Time point Specimen Test Volume Volume Handling Draw Time   Anytime Post EOI  VISIT 18 Week 24 ONLY PK 6 mL Red Top Serum Ambient, Invert 5X NA     Discharge Instructions   Patient may be discharged to home after observation is complete   Remind patient to return for next study visit   Patient discharged at 1550     Charges and Wrap-up   DCRU RNs: IMPORTANT CHANGE, Enter charge in [facility] for Ahead study drug infusion. Charge code is [id number] quantity is 1.   No charge for pre-meds      11-4-2024 nina Good, ALBINA  11/04/24

## 2024-11-04 NOTE — ADDENDUM NOTE
Encounter addended by: Kenney Shipley MD on: 11/4/2024 4:17 PM   Actions taken: Level of Service modified, Clinical Note Signed

## 2024-11-05 ENCOUNTER — TREATMENT (OUTPATIENT)
Dept: PHYSICAL THERAPY | Facility: CLINIC | Age: 74
End: 2024-11-05
Payer: MEDICARE

## 2024-11-05 ENCOUNTER — APPOINTMENT (OUTPATIENT)
Dept: ORTHOPEDIC SURGERY | Facility: CLINIC | Age: 74
End: 2024-11-05
Payer: MEDICARE

## 2024-11-05 DIAGNOSIS — Z87.81 S/P ORIF (OPEN REDUCTION INTERNAL FIXATION) FRACTURE: Primary | ICD-10-CM

## 2024-11-05 DIAGNOSIS — S76.111D RUPTURE OF RIGHT QUADRICEPS TENDON, SUBSEQUENT ENCOUNTER: ICD-10-CM

## 2024-11-05 DIAGNOSIS — Z98.890 S/P ORIF (OPEN REDUCTION INTERNAL FIXATION) FRACTURE: Primary | ICD-10-CM

## 2024-11-05 PROCEDURE — 99024 POSTOP FOLLOW-UP VISIT: CPT | Performed by: ORTHOPAEDIC SURGERY

## 2024-11-05 PROCEDURE — 97110 THERAPEUTIC EXERCISES: CPT | Mod: GP | Performed by: PHYSICAL THERAPIST

## 2024-11-05 ASSESSMENT — PAIN - FUNCTIONAL ASSESSMENT: PAIN_FUNCTIONAL_ASSESSMENT: 0-10

## 2024-11-05 ASSESSMENT — PAIN SCALES - GENERAL: PAINLEVEL_OUTOF10: 0 - NO PAIN

## 2024-11-05 NOTE — PROGRESS NOTES
History of Present Illness   Status post right quadriceps tendon repair from 9/18/2024.  He is doing well in his recovery.  He has been in a T scope brace set 0-30 with physical therapy to be opening it up 10 degrees/week.  He has only had 2 therapy sessions.  His brace is set at 0-50.  Actively without the brace he is able to get to 0 to 90 degrees.  He has no pain and states his right quadriceps tendon repair is going leaps and bounds better than his left quadriceps tendon repair.     Review of Systems   GENERAL: Negative for malaise, significant weight loss, fever  MUSCULOSKELETAL: see HPI  NEURO:  Negative     Past Medical History:   Diagnosis Date    Left lower lobe pneumonia 08/14/2023    Other specified health status     No pertinent past medical history    Pneumonia of right upper lobe due to infectious organism 10/17/2023    Rupture of quadriceps tendon 08/14/2023    Sprain of left rotator cuff capsule 01/04/2016    Wheezing 10/01/2023        Medication Documentation Review Audit       Reviewed by Kayode Saavedra MA (Medical Assistant) on 09/16/24 at 0911      Medication Order Taking? Sig Documenting Provider Last Dose Status   Study AHEAD [id number] or placebo 967 mg in sodium chloride 0.9% 250 mL IV [id number]   Kenney Shipley MD  Active   Study AHEAD [id number] or placebo 969 mg in sodium chloride 0.9% 250 mL IV [id number]   Kenney Shipley MD  Active   Study AHEAD [id number] or placebo 969 mg in sodium chloride 0.9% 250 mL IV [id number]   Kenney Shipley MD  Active   Study AHEAD [id number] or placebo 978 mg in sodium chloride 0.9% 250 mL IV [id number]   Kenney Shipley MD  Active   Study AHEAD [id number] or placebo 990 mg in sodium chloride 0.9% 250 mL IV [id number]   Kenney Shipley MD  Active                     Physical Exam  Right knee  Skin is intact without any evidence of erythema ecchymosis or effusion, there is a well-healed surgical scar  No tenderness to palpation  Range of motion is 0 to 90  degrees  The knee is stable in varus valgus anterior posterior stresses  He is distally neurovascularly intact.     Imaging  MR brain wo IV contrast  Narrative: Interpreted By:  Patrick Gaines,   STUDY:  MR BRAIN WO IV CONTRAST;  10/7/2024 2:31 pm      INDICATION:  Signs/Symptoms:Research.      COMPARISON:  04/22/2024      ACCESSION NUMBER(S):  [id number]      ORDERING CLINICIAN:  DANI GONGORA      TECHNIQUE:  Axial diffusion, axial T2, axial T2 star, axial FLAIR, source bold  imaging, as well as volumetric T1 weighted MRI images of the brain  were obtained as per research protocol.      FINDINGS:  The diffusion weighted images fail to demonstrate evidence of  abnormal diffusion restriction to suggest acute infarction.      There is again evidence of similar mild-to-moderate brain parenchymal  volume loss when compared with the 04/22/2024.      Mild nonspecific white matter changes are again noted within the  cerebral hemispheres bilaterally which while nonspecific, given the  patient's age, likely represent sequelae of more remote small-vessel  ischemic change.      Small foci of encephalomalacia are again noted within the cerebellar  hemispheres bilaterally.      No abnormal blooming dark signal on the T2 star weighted  images to suggest intracranial hemorrhage is noted.      There is minimal mucosal thickening noted within the maxillary  sinuses and scattered ethmoid air cells.      There is opacification of a few mastoid air cells bilaterally.      Impression: MRI performed for research purposes with findings as described above.      MACRO:  None.      Signed by: Patrick Gaines 10/7/2024 2:46 PM  Dictation workstation:   [id number]       Assessment   Status post right quadriceps tendon repair     Plan  Continue with physical therapy to progress the brace 10 degrees/week  Open brace to 90 degrees  Follow-up in 1 month for reevaluation  All questions answered

## 2024-11-05 NOTE — PROGRESS NOTES
Physical Therapy    Physical Therapy Treatment    Patient Name: Wiliam Keenan  MRN: [id number]  Today's Date: 11/5/2024    Time Entry:   Time Calculation  Start Time: 1017  Stop Time: 1058  Time Calculation (min): 41 min     PT Therapeutic Procedures Time Entry  Therapeutic Exercise Time Entry: 41                 Insurance:  Medicare/[place]  BMN V  PA not rethomas  Visit: 4  POC: 14    Assessment:  Therapeutic exercises progressed this date target improving flexibility, strength, and stability of the lower extremities within protocol and pain tolerance. Patient reports mild increase in discomfort with new additions, but is able to complete all activities fully. Patient denies any pain at the end of session. Functional level at this time is appropriate for his current time frame post surgery. PT to plan to progress activity per protocol recommendation next session provided good response to today's treatment.        Plan:  OP PT Plan  PT Plan: Skilled PT  Rehab Potential: Good  Plan of Care Agreement: Patient  Continue to progress strength and stability of the lower extremity as tolerated and per protocol.  Current Problem  1. Rupture of right quadriceps tendon, subsequent encounter  Follow Up In Physical Therapy              General     General  Reason for Referral: R knee pain s/p quadriceps tendon repair 9/18/24  Referred By: Genaro Sweeney PA-C  Past Medical History Relevant to Rehab: Pt denies significant PMH    Subjective    Patient reports that he was cleared by the surgeon to open the brace while walking 0-90 and increase it by 10 degrees per week. Denies pain at this time.     Precautions  Precautions  Precautions Comment: Per protocol    Pain  Pain Assessment  Pain Assessment: 0-10  0-10 (Numeric) Pain Score: 0 - No pain    Objective     Sx 9/18/24: 6, 6 days weeks post op    Per physician note: open brace to 90 deg, progress brace 10 degrees per week    PROM R knee flexion 62      AROM R knee extension 4    PROM R  knee extension 6        Protocol:  REHABILITATION FOLLOWING UNILATERAL QUADRICEPS TENDON REPAIR    I. Immediate Postoperative Phase (Days 1-7)  Goals:  Restore full passive knee extension Diminish pain and joint swelling Restore patellar mobility  Initiate early controlled mobility  *Controlled forces on repair site    Brace: T-Scope brace locked at 0 degrees extension with compression wrap. Sleep and ambulate in brace.    ROM: No ROM    Exercises:  Ankle pumps   Quad Sets  Patellar mobilizations   Hip ABD/ADD  SLR (assisted)    Ice and elevation: 20 minutes of each hour and elevation    Sleep and ambulate in brace locked   Weightbearing: Locked brace with crutches, continue WBAT ROM: No ROM    Initiate gravity eliminate SLR (assisted)    Continue ice and elevation    II.  Maximum Protection Phase (Weeks 2-6)  Goals:  Control forces on healing   Gradually increase knee flexion   Restore full PROM  Restore patellar mobility   Retard muscle atrophy  Week 2:    Brace: Continue use of locked brace (4-6 weeks) Sleep in brace (4-6 weeks)    Weightbearing: Weightbearing as tolerated    ROM: PROM knee flexion only 0-30 degrees Full passive knee extension  Patellar mobilization    Exercises:  Stim to quads   Quad sets   Ankle pumps   Hip ABD/ADD  Gravity eliminated SLR flexion      Week 3:  Continue above mentioned.  Continue use of crutches and WB to 75-80% body weight     Week 4:  Continue all exercises listed above.     Weightbearing: Weightbearing as tolerated    ROM: PROM knee flexion only 0-60 degrees    Exercises:  Initiate weight shifting Initiate proprioception drills    Week  5-6:  Brace: Unlock brace for ambulation at week 6    Weightbearing: Discontinue use of crutches at 5 weeks post op    ROM: PROM 0-60 degrees    Exercises:  Initiate pool program  Active knee extension 90-30 degrees  Multi-angle isometrics knee extension (sub max) Mini- squats  Continue all exercises listed above  Ill. Moderate Protection  "Phase (Weeks 7-16)    Goals:    Control forces during ambulation and ADLs   Progress knee flexion ROM  Improve lower extremity muscular strength   Restore limb confidence and function    Weeks 7-10:  Brace:   Use postop brace unlocked for ambulation until 7-8 weeks (or until determined safe)  Discontinue sleeping in brace  Range of motion:     Week 7: 0-95/100°  Week 8: 0-100/105°  Week 10: 0-115°    Exercises:   Gradually increase muscular strength  Straight leg raises (flexion)   Hip abduction/adduction  Knee extension 90-0  1/2 squats   Leg press   Wall squats   Front lunges  Lateral lunges   Calf raises  Hamstring curls (restricted ROM)  Proprioception drills  Bicycle  Pool program        Treatments:  Therapeutic exercises:   NMES with quad set x10'  Supine ball squeeze 20x5\" NT  Multi angle knee extension submax isometrics 10x5\" ea   Active knee extension 90-30 2x10 (eccentric focus)  SL hip abduction (brace on and locked) 2x10 NT  Weight shifting lateral (brace locked) 15x3\" ea NT  Standing 3 way hip x20 B  HR/TR x20ea NT  HS stretch with stool 3x30\" NT  Mini squats 2x10    NMES PPR 1 10 on/10 off intensity: to tolerance, paired with quad sets (billed as TE)        OP EDUCATION:     10/21/24:  Exercises  - Supine Quad Set  - 2 x daily - 7 x weekly - 2 sets - 10 reps - 5 sec hold  - Seated Knee Extension Stretch with Chair  - 2 x daily - 7 x weekly - 1 sets - 1 reps - 3-5 min hold  - Supine Single Leg Ankle Pumps  - 2 x daily - 7 x weekly - 2 sets - 10 reps    Goals:  By discharge:  1. Patient will report and demonstrate independence with current HEP  2. Patient will demonstrate AROM of the R knee 0-120 to improve their ability to ambulate, negotiate stairs, and squat without restriction  3. Patient will demonstrate the ability to ascend/descend one flight of stairs reciprocally and without an increase in pain to improve function within the home and the community  4. Patient will demonstrate gross hip and knee " strength of >/= 4+/5 to improve the ability to ambulate, squat, and lift without restrictions  5. Patient will demonstrate an increase in Lower Extremity Functional Scale score by 8 points to 62/80 to meet established MCID (baseline 10/21/24 54/80)  6. Patient will demonstrate the ability to perform 15 bodyweight squats while maintaining proper hip and knee mechanics and without pain in the knee exceeding 2/10  7. Patient will report pain of 0/10 at rest, and no greater than 2/10 with any activity including standing, walking, stairs, and squatting  8. Patient will demonstrate right quadriceps and hamstring strength >/= 85% of the contralateral side measured with a handheld dynamometer to indicate improved functional strength and stability of the knee and indicate potential return to recreation activities   9. Patient will demonstrate the ability to ambulate >/= 150' outside of the brace, and without any major deviations in gait to indicate improved mobility within the home and the community   10. Patient will demonstrate the ability to perform 15 forward and 15 backward lunges while maintaining proper hip and knee mechanics and without an increase in pain to indicate improved functional strength of the right lower extremity

## 2024-11-07 ENCOUNTER — TREATMENT (OUTPATIENT)
Dept: PHYSICAL THERAPY | Facility: CLINIC | Age: 74
End: 2024-11-07
Payer: MEDICARE

## 2024-11-07 DIAGNOSIS — S76.111D RUPTURE OF RIGHT QUADRICEPS TENDON, SUBSEQUENT ENCOUNTER: Primary | ICD-10-CM

## 2024-11-07 PROCEDURE — 97110 THERAPEUTIC EXERCISES: CPT | Mod: GP | Performed by: PHYSICAL THERAPIST

## 2024-11-07 ASSESSMENT — PAIN SCALES - GENERAL: PAINLEVEL_OUTOF10: 0 - NO PAIN

## 2024-11-07 ASSESSMENT — PAIN - FUNCTIONAL ASSESSMENT: PAIN_FUNCTIONAL_ASSESSMENT: 0-10

## 2024-11-07 NOTE — PROGRESS NOTES
Physical Therapy    Physical Therapy Treatment    Patient Name: Wiliam Keenan  MRN: [id number]  Today's Date: 11/7/2024    Time Entry:   Time Calculation  Start Time: 1023  Stop Time: 1103  Time Calculation (min): 40 min     PT Therapeutic Procedures Time Entry  Therapeutic Exercise Time Entry: 39                 Insurance:  Medicare/[place]  BMN V  PA not rethomas  Visit: 5  POC: 14    Assessment:  Therapeutic exercises progressed this date per protocol suggestion. Patient tolerates all treatment well, with only mild discomfort in the knee joint that dissipates quickly. Therapeutic exercises continue to focus on improving knee extension flexibility, increasing quadriceps strength and control, and improving overall function within protocol restrictions. Pain reported as 0/10 at end of session.  HEP updated for additional progression in knee extension ROM while not in PT sessions. Patient is progressing well, and he remains a good candidate for additional skilled PT.        Plan:  OP PT Plan  PT Plan: Skilled PT  Rehab Potential: Good  Plan of Care Agreement: Patient  Continue to progress strength and stability of the lower extremity as tolerated and per protocol.  Current Problem  1. Rupture of right quadriceps tendon, subsequent encounter  Follow Up In Physical Therapy                General     General  Reason for Referral: R knee pain s/p quadriceps tendon repair 9/18/24  Referred By: Genaro Sweeney PA-C  Past Medical History Relevant to Rehab: Pt denies significant PMH    Subjective    Patient reports that he has been feeling good for the most part. Denies pain in the knee at this time.     Precautions  Precautions  STEADI Fall Risk Score (The score of 4 or more indicates an increased risk of falling): 3  Precautions Comment: Per protocol    Pain  Pain Assessment  Pain Assessment: 0-10  0-10 (Numeric) Pain Score: 0 - No pain    Objective     Sx 9/18/24: 7 weeks, 1 day weeks post op    Per physician note: open brace to 90  deg, progress brace 10 degrees per week    Pt able to complete SLR with minimal quad lag with cueing from PT     AROM R knee extension +4    PROM R knee extension +6        Protocol:  REHABILITATION FOLLOWING UNILATERAL QUADRICEPS TENDON REPAIR    I. Immediate Postoperative Phase (Days 1-7)  Goals:  Restore full passive knee extension Diminish pain and joint swelling Restore patellar mobility  Initiate early controlled mobility  *Controlled forces on repair site    Brace: T-Scope brace locked at 0 degrees extension with compression wrap. Sleep and ambulate in brace.    ROM: No ROM    Exercises:  Ankle pumps   Quad Sets  Patellar mobilizations   Hip ABD/ADD  SLR (assisted)    Ice and elevation: 20 minutes of each hour and elevation    Sleep and ambulate in brace locked   Weightbearing: Locked brace with crutches, continue WBAT ROM: No ROM    Initiate gravity eliminate SLR (assisted)    Continue ice and elevation    II.  Maximum Protection Phase (Weeks 2-6)  Goals:  Control forces on healing   Gradually increase knee flexion   Restore full PROM  Restore patellar mobility   Retard muscle atrophy  Week 2:    Brace: Continue use of locked brace (4-6 weeks) Sleep in brace (4-6 weeks)    Weightbearing: Weightbearing as tolerated    ROM: PROM knee flexion only 0-30 degrees Full passive knee extension  Patellar mobilization    Exercises:  Stim to quads   Quad sets   Ankle pumps   Hip ABD/ADD  Gravity eliminated SLR flexion      Week 3:  Continue above mentioned.  Continue use of crutches and WB to 75-80% body weight     Week 4:  Continue all exercises listed above.     Weightbearing: Weightbearing as tolerated    ROM: PROM knee flexion only 0-60 degrees    Exercises:  Initiate weight shifting Initiate proprioception drills    Week  5-6:  Brace: Unlock brace for ambulation at week 6    Weightbearing: Discontinue use of crutches at 5 weeks post op    ROM: PROM 0-60 degrees    Exercises:  Initiate pool program  Active knee  "extension 90-30 degrees  Multi-angle isometrics knee extension (sub max) Mini- squats  Continue all exercises listed above  Ill. Moderate Protection Phase (Weeks 7-16)    Goals:    Control forces during ambulation and ADLs   Progress knee flexion ROM  Improve lower extremity muscular strength   Restore limb confidence and function    Weeks 7-10:  Brace:   Use postop brace unlocked for ambulation until 7-8 weeks (or until determined safe)  Discontinue sleeping in brace  Range of motion:     Week 7: 0-95/100°  Week 8: 0-100/105°  Week 10: 0-115°    Exercises:   Gradually increase muscular strength  Straight leg raises (flexion)   Hip abduction/adduction  Knee extension 90-0  1/2 squats   Leg press   Wall squats   Front lunges  Lateral lunges   Calf raises  Hamstring curls (restricted ROM)  Proprioception drills  Bicycle  Pool program        Treatments:  Therapeutic exercises:   Upright bike Rocking L0 x5' SH 9  NMES with quad set x10'  Supine ball squeeze 20x5\" NT  Multi angle knee extension submax isometrics 10x5\" ea   Active knee extension 90-30 2x10 (eccentric focus) NT  SL hip abduction (brace on and locked) 2x10 NT  SLR (outside of brace) 2x10   Weight shifting lateral (brace locked) 15x3\" ea NT  Standing 3 way hip x20 B NT  HR/TR x20ea NT  HS stretch with stool 3x30\"   Mini squats 2x10  Lunge on step 15x5\" ea     NMES PPR 1 10 on/10 off intensity: to tolerance, paired with quad sets (billed as TE)        OP EDUCATION:     10/21/24:  Exercises  - Supine Quad Set  - 2 x daily - 7 x weekly - 2 sets - 10 reps - 5 sec hold  - Seated Knee Extension Stretch with Chair  - 2 x daily - 7 x weekly - 1 sets - 1 reps - 3-5 min hold  - Supine Single Leg Ankle Pumps  - 2 x daily - 7 x weekly - 2 sets - 10 reps  11/7/24:  Exercises  - Seated Hamstring Stretch  - 2 x daily - 7 x weekly - 1 sets - 3 reps - 30 sec hold  - Long Sitting Calf Stretch with Strap  - 2 x daily - 7 x weekly - 1 sets - 3 reps - 30 sec hold    Goals:  By " discharge:  1. Patient will report and demonstrate independence with current HEP  2. Patient will demonstrate AROM of the R knee 0-120 to improve their ability to ambulate, negotiate stairs, and squat without restriction  3. Patient will demonstrate the ability to ascend/descend one flight of stairs reciprocally and without an increase in pain to improve function within the home and the community  4. Patient will demonstrate gross hip and knee strength of >/= 4+/5 to improve the ability to ambulate, squat, and lift without restrictions  5. Patient will demonstrate an increase in Lower Extremity Functional Scale score by 8 points to 62/80 to meet established MCID (baseline 10/21/24 54/80)  6. Patient will demonstrate the ability to perform 15 bodyweight squats while maintaining proper hip and knee mechanics and without pain in the knee exceeding 2/10  7. Patient will report pain of 0/10 at rest, and no greater than 2/10 with any activity including standing, walking, stairs, and squatting  8. Patient will demonstrate right quadriceps and hamstring strength >/= 85% of the contralateral side measured with a handheld dynamometer to indicate improved functional strength and stability of the knee and indicate potential return to recreation activities   9. Patient will demonstrate the ability to ambulate >/= 150' outside of the brace, and without any major deviations in gait to indicate improved mobility within the home and the community   10. Patient will demonstrate the ability to perform 15 forward and 15 backward lunges while maintaining proper hip and knee mechanics and without an increase in pain to indicate improved functional strength of the right lower extremity

## 2024-11-08 NOTE — ADDENDUM NOTE
Encounter addended by: Kemi Cooper RN on: 11/8/2024 6:49 AM   Actions taken: Charge Capture section accepted

## 2024-11-12 ENCOUNTER — TREATMENT (OUTPATIENT)
Dept: PHYSICAL THERAPY | Facility: CLINIC | Age: 74
End: 2024-11-12
Payer: MEDICARE

## 2024-11-12 DIAGNOSIS — S76.111D RUPTURE OF RIGHT QUADRICEPS TENDON, SUBSEQUENT ENCOUNTER: Primary | ICD-10-CM

## 2024-11-12 PROCEDURE — 97110 THERAPEUTIC EXERCISES: CPT | Mod: GP | Performed by: PHYSICAL THERAPIST

## 2024-11-12 ASSESSMENT — PAIN - FUNCTIONAL ASSESSMENT: PAIN_FUNCTIONAL_ASSESSMENT: 0-10

## 2024-11-12 ASSESSMENT — PAIN SCALES - GENERAL: PAINLEVEL_OUTOF10: 0 - NO PAIN

## 2024-11-12 NOTE — PROGRESS NOTES
Physical Therapy    Physical Therapy Treatment    Patient Name: Wiliam Keenan  MRN: [id number]  Today's Date: 11/12/2024    Time Entry:   Time Calculation  Start Time: 1018  Stop Time: 1101  Time Calculation (min): 43 min     PT Therapeutic Procedures Time Entry  Therapeutic Exercise Time Entry: 43                 Insurance:  Medicare/[place]  BMN V  PA not req  Visit: 6  POC: 14    Assessment:  PT is able to progress difficulty of therapeutic exercises this date with good tolerance from patient. Mild increase in discomfort is noted in the knee joint following stretching for knee flexion, but this dissipates quickly. Pain reported as 0/10 at end of session. Knee extension and flexion ROM are progression well, and patient is encouraged to continue to work on his stretches for knee extension outside of PT sessions. HEP updated for static knee extension stretch at home. He is progressing well, and is on track for a decrease in frequency to 1x/week in the next 1-2 sessions.        Plan:  OP PT Plan  PT Plan: Skilled PT  Rehab Potential: Good  Plan of Care Agreement: Patient  Continue to progress strength and stability of the lower extremity as tolerated and per protocol.  Current Problem  1. Rupture of right quadriceps tendon, subsequent encounter  Follow Up In Physical Therapy                  General     General  Reason for Referral: R knee pain s/p quadriceps tendon repair 9/18/24  Referred By: Genaro Sweeney PA-C  Past Medical History Relevant to Rehab: Pt denies significant PMH    Subjective    Pt reports that he has been feeling good. Denies pain in the knee.     Precautions  Precautions  STEADI Fall Risk Score (The score of 4 or more indicates an increased risk of falling): 3  Precautions Comment: Per protocol    Pain  Pain Assessment  Pain Assessment: 0-10  0-10 (Numeric) Pain Score: 0 - No pain    Objective     Sx 9/18/24: 7 weeks, 6 day weeks post op    Per physician note: open brace to 90 deg, progress brace 10  degrees per week  (Brace opened to 100 on 11/12/24)    AROM R knee flexion 105     AROM R knee extension +3    PROM R knee extension +5        Protocol:  REHABILITATION FOLLOWING UNILATERAL QUADRICEPS TENDON REPAIR    I. Immediate Postoperative Phase (Days 1-7)  Goals:  Restore full passive knee extension Diminish pain and joint swelling Restore patellar mobility  Initiate early controlled mobility  *Controlled forces on repair site    Brace: T-Scope brace locked at 0 degrees extension with compression wrap. Sleep and ambulate in brace.    ROM: No ROM    Exercises:  Ankle pumps   Quad Sets  Patellar mobilizations   Hip ABD/ADD  SLR (assisted)    Ice and elevation: 20 minutes of each hour and elevation    Sleep and ambulate in brace locked   Weightbearing: Locked brace with crutches, continue WBAT ROM: No ROM    Initiate gravity eliminate SLR (assisted)    Continue ice and elevation    II.  Maximum Protection Phase (Weeks 2-6)  Goals:  Control forces on healing   Gradually increase knee flexion   Restore full PROM  Restore patellar mobility   Retard muscle atrophy  Week 2:    Brace: Continue use of locked brace (4-6 weeks) Sleep in brace (4-6 weeks)    Weightbearing: Weightbearing as tolerated    ROM: PROM knee flexion only 0-30 degrees Full passive knee extension  Patellar mobilization    Exercises:  Stim to quads   Quad sets   Ankle pumps   Hip ABD/ADD  Gravity eliminated SLR flexion      Week 3:  Continue above mentioned.  Continue use of crutches and WB to 75-80% body weight     Week 4:  Continue all exercises listed above.     Weightbearing: Weightbearing as tolerated    ROM: PROM knee flexion only 0-60 degrees    Exercises:  Initiate weight shifting Initiate proprioception drills    Week  5-6:  Brace: Unlock brace for ambulation at week 6    Weightbearing: Discontinue use of crutches at 5 weeks post op    ROM: PROM 0-60 degrees    Exercises:  Initiate pool program  Active knee extension 90-30  "degrees  Multi-angle isometrics knee extension (sub max) Mini- squats  Continue all exercises listed above  Ill. Moderate Protection Phase (Weeks 7-16)    Goals:    Control forces during ambulation and ADLs   Progress knee flexion ROM  Improve lower extremity muscular strength   Restore limb confidence and function    Weeks 7-10:  Brace:   Use postop brace unlocked for ambulation until 7-8 weeks (or until determined safe)  Discontinue sleeping in brace  Range of motion:     Week 7: 0-95/100°  Week 8: 0-100/105°  Week 10: 0-115°    Exercises:   Gradually increase muscular strength  Straight leg raises (flexion)   Hip abduction/adduction  Knee extension 90-0  1/2 squats   Leg press   Wall squats   Front lunges  Lateral lunges   Calf raises  Hamstring curls (restricted ROM)  Proprioception drills  Bicycle  Pool program        Treatments:  Therapeutic exercises:   Upright bike Rocking L0 x5' SH 7  NMES with quad set x7'  NMES with SLR x4'  Supine ball squeeze 20x5\" NT  Multi angle knee extension submax isometrics 10x5\" ea   Active knee extension 0-90 2x10 (eccentric focus)   SL hip abduction (brace on and locked) 2x10 NT  SLR (outside of brace) 2x10 NT  Weight shifting lateral (brace locked) 15x3\" ea NT  Standing 3 way hip x20 B NT  HR/TR x20ea NT  HS stretch with stool 3x30\" NT  Mini squats 2x10  Lunge on step 15x5\" ea   Lateral 1/2 lung on step 2x10    NMES PPR 1 10 on/10 off intensity: to tolerance, paired with quad sets (billed as TE)        OP EDUCATION:     10/21/24:  Exercises  - Supine Quad Set  - 2 x daily - 7 x weekly - 2 sets - 10 reps - 5 sec hold  - Seated Knee Extension Stretch with Chair  - 2 x daily - 7 x weekly - 1 sets - 1 reps - 3-5 min hold  - Supine Single Leg Ankle Pumps  - 2 x daily - 7 x weekly - 2 sets - 10 reps  11/7/24:  Exercises  - Seated Hamstring Stretch  - 2 x daily - 7 x weekly - 1 sets - 3 reps - 30 sec hold  - Long Sitting Calf Stretch with Strap  - 2 x daily - 7 x weekly - 1 sets - 3 " reps - 30 sec hold  11/12/24:  Exercises  - Prone Knee Extension Hang  - 2 x daily - 7 x weekly - 1 sets - 1 reps - 5 min hold  Goals:  By discharge:  1. Patient will report and demonstrate independence with current HEP  2. Patient will demonstrate AROM of the R knee 0-120 to improve their ability to ambulate, negotiate stairs, and squat without restriction  3. Patient will demonstrate the ability to ascend/descend one flight of stairs reciprocally and without an increase in pain to improve function within the home and the community  4. Patient will demonstrate gross hip and knee strength of >/= 4+/5 to improve the ability to ambulate, squat, and lift without restrictions  5. Patient will demonstrate an increase in Lower Extremity Functional Scale score by 8 points to 62/80 to meet established MCID (baseline 10/21/24 54/80)  6. Patient will demonstrate the ability to perform 15 bodyweight squats while maintaining proper hip and knee mechanics and without pain in the knee exceeding 2/10  7. Patient will report pain of 0/10 at rest, and no greater than 2/10 with any activity including standing, walking, stairs, and squatting  8. Patient will demonstrate right quadriceps and hamstring strength >/= 85% of the contralateral side measured with a handheld dynamometer to indicate improved functional strength and stability of the knee and indicate potential return to recreation activities   9. Patient will demonstrate the ability to ambulate >/= 150' outside of the brace, and without any major deviations in gait to indicate improved mobility within the home and the community   10. Patient will demonstrate the ability to perform 15 forward and 15 backward lunges while maintaining proper hip and knee mechanics and without an increase in pain to indicate improved functional strength of the right lower extremity

## 2024-11-14 ENCOUNTER — TREATMENT (OUTPATIENT)
Dept: PHYSICAL THERAPY | Facility: CLINIC | Age: 74
End: 2024-11-14
Payer: MEDICARE

## 2024-11-14 DIAGNOSIS — S76.111D RUPTURE OF RIGHT QUADRICEPS TENDON, SUBSEQUENT ENCOUNTER: Primary | ICD-10-CM

## 2024-11-14 PROCEDURE — 97110 THERAPEUTIC EXERCISES: CPT | Mod: GP,CQ

## 2024-11-14 ASSESSMENT — PAIN SCALES - GENERAL: PAINLEVEL_OUTOF10: 0 - NO PAIN

## 2024-11-14 ASSESSMENT — PAIN - FUNCTIONAL ASSESSMENT: PAIN_FUNCTIONAL_ASSESSMENT: 0-10

## 2024-11-14 NOTE — PROGRESS NOTES
Physical Therapy    Physical Therapy Treatment    Patient Name: Wiliam Keenan  MRN: [id number]  Today's Date: 11/14/2024    Time Entry:   Time Calculation  Start Time: 1017  Stop Time: 1104  Time Calculation (min): 47 min     PT Therapeutic Procedures Time Entry  Therapeutic Exercise Time Entry: 47                 Insurance:  Medicare/[place]  BMN V  PA not req  Visit: 7  POC: 14    Assessment:    Pt had no complaints of increased pain during or after exercises. Minimal muscle fatigue only as expected. He would benefit from PT to continue to address impairments in order to improve strength, flexibility, gait, and body mechanics and to decrease symptoms and increase overall function.   Plan:  OP PT Plan  PT Plan: Skilled PT  Continue to progress strength and stability of the lower extremity as tolerated and per protocol.  Current Problem  1. Rupture of right quadriceps tendon, subsequent encounter  Follow Up In Physical Therapy                    General     General  Reason for Referral: R knee pain s/p quadriceps tendon repair 9/18/24  Referred By: Genaro Sweeney PA-C  Past Medical History Relevant to Rehab: Pt denies significant PMH    Subjective    Pt denies current pain in right knee. He states that he did fine with progression of exercises last visit and had no pain.     Precautions  Precautions  STEADI Fall Risk Score (The score of 4 or more indicates an increased risk of falling): 3  Precautions Comment: Per protocol    Pain  Pain Assessment  Pain Assessment: 0-10  0-10 (Numeric) Pain Score: 0 - No pain    Objective     Sx 9/18/24: 8 weeks, 1 day weeks post op    Per physician note: open brace to 90 deg, progress brace 10 degrees per week  (Brace opened to 100 on 11/12/24)    AROM R knee flexion 105     AROM R knee extension -3            Protocol:  REHABILITATION FOLLOWING UNILATERAL QUADRICEPS TENDON REPAIR    I. Immediate Postoperative Phase (Days 1-7)  Goals:  Restore full passive knee extension Diminish pain  and joint swelling Restore patellar mobility  Initiate early controlled mobility  *Controlled forces on repair site    Brace: T-Scope brace locked at 0 degrees extension with compression wrap. Sleep and ambulate in brace.    ROM: No ROM    Exercises:  Ankle pumps   Quad Sets  Patellar mobilizations   Hip ABD/ADD  SLR (assisted)    Ice and elevation: 20 minutes of each hour and elevation    Sleep and ambulate in brace locked   Weightbearing: Locked brace with crutches, continue WBAT ROM: No ROM    Initiate gravity eliminate SLR (assisted)    Continue ice and elevation    II.  Maximum Protection Phase (Weeks 2-6)  Goals:  Control forces on healing   Gradually increase knee flexion   Restore full PROM  Restore patellar mobility   Retard muscle atrophy  Week 2:    Brace: Continue use of locked brace (4-6 weeks) Sleep in brace (4-6 weeks)    Weightbearing: Weightbearing as tolerated    ROM: PROM knee flexion only 0-30 degrees Full passive knee extension  Patellar mobilization    Exercises:  Stim to quads   Quad sets   Ankle pumps   Hip ABD/ADD  Gravity eliminated SLR flexion      Week 3:  Continue above mentioned.  Continue use of crutches and WB to 75-80% body weight     Week 4:  Continue all exercises listed above.     Weightbearing: Weightbearing as tolerated    ROM: PROM knee flexion only 0-60 degrees    Exercises:  Initiate weight shifting Initiate proprioception drills    Week  5-6:  Brace: Unlock brace for ambulation at week 6    Weightbearing: Discontinue use of crutches at 5 weeks post op    ROM: PROM 0-60 degrees    Exercises:  Initiate pool program  Active knee extension 90-30 degrees  Multi-angle isometrics knee extension (sub max) Mini- squats  Continue all exercises listed above  Ill. Moderate Protection Phase (Weeks 7-16)    Goals:    Control forces during ambulation and ADLs   Progress knee flexion ROM  Improve lower extremity muscular strength   Restore limb confidence and function    Weeks  "7-10:  Brace:   Use postop brace unlocked for ambulation until 7-8 weeks (or until determined safe)  Discontinue sleeping in brace  Range of motion:     Week 7: 0-95/100°  Week 8: 0-100/105°  Week 10: 0-115°    Exercises:   Gradually increase muscular strength  Straight leg raises (flexion)   Hip abduction/adduction  Knee extension 90-0  1/2 squats   Leg press   Wall squats   Front lunges  Lateral lunges   Calf raises  Hamstring curls (restricted ROM)  Proprioception drills  Bicycle  Pool program        Treatments:  Therapeutic exercises:     Upright bike Rocking L0 x5' SH 7  NMES with quad set x 7'  NMES with SLR x 4'  Supine ball squeeze 20x5\" NT  Multi angle knee extension submax isometrics 10x5\" ea NT  Active knee extension 0-90 2x10 (eccentric focus)   SL hip abduction (brace on and locked) 2x10 NT  SLR (outside of brace) 2x10 NT  Weight shifting lateral (brace locked) 15x3\" ea NT  Standing 3 way hip x 20 B   HR/TR x20ea NT  HS stretch with stool 3x30\"   Mini squats 2x10  Lunge on step 10x5\" ea   Lateral 1/2 lung on step 2x10 NT    NMES PPR 1 10 on/10 off intensity: to tolerance, paired with quad sets (billed as TE)    Brace donned for all weight bearing exercises     OP EDUCATION:     10/21/24:  Exercises  - Supine Quad Set  - 2 x daily - 7 x weekly - 2 sets - 10 reps - 5 sec hold  - Seated Knee Extension Stretch with Chair  - 2 x daily - 7 x weekly - 1 sets - 1 reps - 3-5 min hold  - Supine Single Leg Ankle Pumps  - 2 x daily - 7 x weekly - 2 sets - 10 reps  11/7/24:  Exercises  - Seated Hamstring Stretch  - 2 x daily - 7 x weekly - 1 sets - 3 reps - 30 sec hold  - Long Sitting Calf Stretch with Strap  - 2 x daily - 7 x weekly - 1 sets - 3 reps - 30 sec hold  11/12/24:  Exercises  - Prone Knee Extension Hang  - 2 x daily - 7 x weekly - 1 sets - 1 reps - 5 min hold  Goals:  By discharge:  1. Patient will report and demonstrate independence with current HEP  2. Patient will demonstrate AROM of the R knee 0-120 " to improve their ability to ambulate, negotiate stairs, and squat without restriction  3. Patient will demonstrate the ability to ascend/descend one flight of stairs reciprocally and without an increase in pain to improve function within the home and the community  4. Patient will demonstrate gross hip and knee strength of >/= 4+/5 to improve the ability to ambulate, squat, and lift without restrictions  5. Patient will demonstrate an increase in Lower Extremity Functional Scale score by 8 points to 62/80 to meet established MCID (baseline 10/21/24 54/80)  6. Patient will demonstrate the ability to perform 15 bodyweight squats while maintaining proper hip and knee mechanics and without pain in the knee exceeding 2/10  7. Patient will report pain of 0/10 at rest, and no greater than 2/10 with any activity including standing, walking, stairs, and squatting  8. Patient will demonstrate right quadriceps and hamstring strength >/= 85% of the contralateral side measured with a handheld dynamometer to indicate improved functional strength and stability of the knee and indicate potential return to recreation activities   9. Patient will demonstrate the ability to ambulate >/= 150' outside of the brace, and without any major deviations in gait to indicate improved mobility within the home and the community   10. Patient will demonstrate the ability to perform 15 forward and 15 backward lunges while maintaining proper hip and knee mechanics and without an increase in pain to indicate improved functional strength of the right lower extremity

## 2024-11-19 ENCOUNTER — TREATMENT (OUTPATIENT)
Dept: PHYSICAL THERAPY | Facility: CLINIC | Age: 74
End: 2024-11-19
Payer: MEDICARE

## 2024-11-19 DIAGNOSIS — S76.111D RUPTURE OF RIGHT QUADRICEPS TENDON, SUBSEQUENT ENCOUNTER: ICD-10-CM

## 2024-11-19 PROCEDURE — 97110 THERAPEUTIC EXERCISES: CPT | Mod: GP | Performed by: PHYSICAL THERAPIST

## 2024-11-19 ASSESSMENT — PAIN SCALES - GENERAL: PAINLEVEL_OUTOF10: 0 - NO PAIN

## 2024-11-19 ASSESSMENT — PAIN - FUNCTIONAL ASSESSMENT: PAIN_FUNCTIONAL_ASSESSMENT: 0-10

## 2024-11-19 NOTE — PROGRESS NOTES
Physical Therapy    Physical Therapy Treatment    Patient Name: Wiliam Keenan  MRN: [id number]  Today's Date: 11/19/2024    Time Entry:   Time Calculation  Start Time: 1015  Stop Time: 1056  Time Calculation (min): 41 min     PT Therapeutic Procedures Time Entry  Therapeutic Exercise Time Entry: 41                 Insurance:  Medicare/[place]  BMN V  PA not req  Visit: 8  POC: 14    Assessment:    PT progresses therapeutic exercises per protocol recommendation, and patient tolerates all treatment well without reports of increased pain. Patient is challenged with new additions, but is able to complete all activities fully. Quad weakness and inhibition continues, and is evidenced by patient's ability to only perform 10 straight leg raises prior to onset of quad lag. ROM for knee flexion is improving, but lack of full extension remains. Pain reported as 0/10 at the end of session. He is progressing well, and remains a good candidate for additional skilled PT.   Plan:  OP PT Plan  PT Plan: Skilled PT  Continue to progress strength and stability of the lower extremity as tolerated and per protocol.  Current Problem  1. Rupture of right quadriceps tendon, subsequent encounter  Follow Up In Physical Therapy                      General     General  Reason for Referral: R knee pain s/p quadriceps tendon repair 9/18/24  Referred By: Genaro Sweeney PA-C  Past Medical History Relevant to Rehab: Pt denies significant PMH    Subjective    Pt reports that he has been feeling good. Denies pain in the knee at this time. Notes that the exercises that gave him issues in the past seem to be improving as well.     Precautions  Precautions  STEADI Fall Risk Score (The score of 4 or more indicates an increased risk of falling): 3  Precautions Comment: Per protocol    Pain  Pain Assessment  Pain Assessment: 0-10  0-10 (Numeric) Pain Score: 0 - No pain    Objective     Sx 9/18/24: 8 weeks, 6 day weeks post op    Per physician note: open brace  to 90 deg, progress brace 10 degrees per week  (Brace opened to 110 on 11/19/24)    AROM R knee flexion 105     Pt able to complete 10 SLR prior to quad lag beginning               Protocol:  REHABILITATION FOLLOWING UNILATERAL QUADRICEPS TENDON REPAIR    I. Immediate Postoperative Phase (Days 1-7)  Goals:  Restore full passive knee extension Diminish pain and joint swelling Restore patellar mobility  Initiate early controlled mobility  *Controlled forces on repair site    Brace: T-Scope brace locked at 0 degrees extension with compression wrap. Sleep and ambulate in brace.    ROM: No ROM    Exercises:  Ankle pumps   Quad Sets  Patellar mobilizations   Hip ABD/ADD  SLR (assisted)    Ice and elevation: 20 minutes of each hour and elevation    Sleep and ambulate in brace locked   Weightbearing: Locked brace with crutches, continue WBAT ROM: No ROM    Initiate gravity eliminate SLR (assisted)    Continue ice and elevation    II.  Maximum Protection Phase (Weeks 2-6)  Goals:  Control forces on healing   Gradually increase knee flexion   Restore full PROM  Restore patellar mobility   Retard muscle atrophy  Week 2:    Brace: Continue use of locked brace (4-6 weeks) Sleep in brace (4-6 weeks)    Weightbearing: Weightbearing as tolerated    ROM: PROM knee flexion only 0-30 degrees Full passive knee extension  Patellar mobilization    Exercises:  Stim to quads   Quad sets   Ankle pumps   Hip ABD/ADD  Gravity eliminated SLR flexion      Week 3:  Continue above mentioned.  Continue use of crutches and WB to 75-80% body weight     Week 4:  Continue all exercises listed above.     Weightbearing: Weightbearing as tolerated    ROM: PROM knee flexion only 0-60 degrees    Exercises:  Initiate weight shifting Initiate proprioception drills    Week  5-6:  Brace: Unlock brace for ambulation at week 6    Weightbearing: Discontinue use of crutches at 5 weeks post op    ROM: PROM 0-60 degrees    Exercises:  Initiate pool program  Active  "knee extension 90-30 degrees  Multi-angle isometrics knee extension (sub max) Mini- squats  Continue all exercises listed above  Ill. Moderate Protection Phase (Weeks 7-16)    Goals:    Control forces during ambulation and ADLs   Progress knee flexion ROM  Improve lower extremity muscular strength   Restore limb confidence and function    Weeks 7-10:  Brace:   Use postop brace unlocked for ambulation until 7-8 weeks (or until determined safe)  Discontinue sleeping in brace  Range of motion:     Week 7: 0-95/100°  Week 8: 0-100/105°  Week 10: 0-115°    Exercises:   Gradually increase muscular strength  Straight leg raises (flexion)   Hip abduction/adduction  Knee extension 90-0  1/2 squats   Leg press   Wall squats   Front lunges  Lateral lunges   Calf raises  Hamstring curls (restricted ROM)  Proprioception drills  Bicycle  Pool program        Treatments:  Therapeutic exercises:     Upright bike Rocking L0 x5' SH 7  NMES with quad set x 5'  NMES with SLR x 5'  Supine ball squeeze 20x5\" NT  Multi angle knee extension submax isometrics 10x5\" ea NT  Active knee extension 0-90 2x10 (eccentric focus)   SL hip abduction (brace on and locked) 2x10 NT  SLR (outside of brace) 2x10 NT  Weight shifting lateral (brace locked) 15x3\" ea NT  Standing 3 way hip x 20 B  NT  HR/TR x20ea NT  HS stretch with stool 3x30\" NT  Mini squats 2x10 NT  Lunge on step 10x5\" ea   Lateral 1/2 lung on step 2x10 NT  Heel slides x10  Leg press 1 1/2 pl 3x10  Ball on wall squat 2x10    NMES PPR 1 10 on/10 off intensity: to tolerance, paired with quad sets (billed as TE)    Brace donned for all weight bearing exercises     OP EDUCATION:     10/21/24:  Exercises  - Supine Quad Set  - 2 x daily - 7 x weekly - 2 sets - 10 reps - 5 sec hold  - Seated Knee Extension Stretch with Chair  - 2 x daily - 7 x weekly - 1 sets - 1 reps - 3-5 min hold  - Supine Single Leg Ankle Pumps  - 2 x daily - 7 x weekly - 2 sets - 10 reps  11/7/24:  Exercises  - Seated " Hamstring Stretch  - 2 x daily - 7 x weekly - 1 sets - 3 reps - 30 sec hold  - Long Sitting Calf Stretch with Strap  - 2 x daily - 7 x weekly - 1 sets - 3 reps - 30 sec hold  11/12/24:  Exercises  - Prone Knee Extension Hang  - 2 x daily - 7 x weekly - 1 sets - 1 reps - 5 min hold  Goals:  By discharge:  1. Patient will report and demonstrate independence with current HEP  2. Patient will demonstrate AROM of the R knee 0-120 to improve their ability to ambulate, negotiate stairs, and squat without restriction  3. Patient will demonstrate the ability to ascend/descend one flight of stairs reciprocally and without an increase in pain to improve function within the home and the community  4. Patient will demonstrate gross hip and knee strength of >/= 4+/5 to improve the ability to ambulate, squat, and lift without restrictions  5. Patient will demonstrate an increase in Lower Extremity Functional Scale score by 8 points to 62/80 to meet established MCID (baseline 10/21/24 54/80)  6. Patient will demonstrate the ability to perform 15 bodyweight squats while maintaining proper hip and knee mechanics and without pain in the knee exceeding 2/10  7. Patient will report pain of 0/10 at rest, and no greater than 2/10 with any activity including standing, walking, stairs, and squatting  8. Patient will demonstrate right quadriceps and hamstring strength >/= 85% of the contralateral side measured with a handheld dynamometer to indicate improved functional strength and stability of the knee and indicate potential return to recreation activities   9. Patient will demonstrate the ability to ambulate >/= 150' outside of the brace, and without any major deviations in gait to indicate improved mobility within the home and the community   10. Patient will demonstrate the ability to perform 15 forward and 15 backward lunges while maintaining proper hip and knee mechanics and without an increase in pain to indicate improved functional  strength of the right lower extremity

## 2024-11-21 ENCOUNTER — APPOINTMENT (OUTPATIENT)
Dept: PHYSICAL THERAPY | Facility: CLINIC | Age: 74
End: 2024-11-21
Payer: MEDICARE

## 2024-12-03 DIAGNOSIS — Z00.6 CLINICAL TRIAL PARTICIPANT: Primary | ICD-10-CM

## 2024-12-04 ENCOUNTER — DOCUMENTATION (OUTPATIENT)
Dept: PHYSICAL THERAPY | Facility: CLINIC | Age: 74
End: 2024-12-04

## 2024-12-04 NOTE — PROGRESS NOTES
Patient arrives to PT session this date noting that he forgot his brace due to rushing out of the house. Due to patient not being cleared to perform activity outside of the brace as of orthopedic's last note, PT offers the patient to focus only on NWB activity this date, or reschedule his appointment. After discussion, patient elects to reschedule today's appointment for a later day. PT reinforces importance of adherence to postoperative brace instructions, and to be wearing the brace at all times when weightbearing. Patient voices understanding.

## 2024-12-10 ENCOUNTER — APPOINTMENT (OUTPATIENT)
Dept: ORTHOPEDIC SURGERY | Facility: CLINIC | Age: 74
End: 2024-12-10
Payer: MEDICARE

## 2024-12-11 ENCOUNTER — TREATMENT (OUTPATIENT)
Dept: PHYSICAL THERAPY | Facility: CLINIC | Age: 74
End: 2024-12-11
Payer: MEDICARE

## 2024-12-11 DIAGNOSIS — S76.111D RUPTURE OF RIGHT QUADRICEPS TENDON, SUBSEQUENT ENCOUNTER: ICD-10-CM

## 2024-12-11 PROCEDURE — 97110 THERAPEUTIC EXERCISES: CPT | Mod: GP | Performed by: PHYSICAL THERAPIST

## 2024-12-11 ASSESSMENT — PAIN SCALES - GENERAL: PAINLEVEL_OUTOF10: 0 - NO PAIN

## 2024-12-11 ASSESSMENT — PAIN - FUNCTIONAL ASSESSMENT: PAIN_FUNCTIONAL_ASSESSMENT: 0-10

## 2024-12-11 NOTE — PROGRESS NOTES
History of Present Illness   Patient is here for his right knee.  He is approximately 3 months status post right quadriceps tendon repair.  He is doing well.  He has been going to therapy.     Review of Systems   GENERAL: Negative for malaise, significant weight loss, fever  MUSCULOSKELETAL: see HPI  NEURO:  Negative     Past Medical History:   Diagnosis Date    Left lower lobe pneumonia 08/14/2023    Other specified health status     No pertinent past medical history    Pneumonia of right upper lobe due to infectious organism 10/17/2023    Rupture of quadriceps tendon 08/14/2023    Sprain of left rotator cuff capsule 01/04/2016    Wheezing 10/01/2023        Medication Documentation Review Audit       Reviewed by Kayode Saavedra MA (Medical Assistant) on 09/16/24 at 0911      Medication Order Taking? Sig Documenting Provider Last Dose Status   Study AHEAD [id number] or placebo 967 mg in sodium chloride 0.9% 250 mL IV [id number]   Kenney Shipley MD  Active   Study AHEAD [id number] or placebo 969 mg in sodium chloride 0.9% 250 mL IV [id number]   Kenney Shipley MD  Active   Study AHEAD [id number] or placebo 969 mg in sodium chloride 0.9% 250 mL IV [id number]   Kenney Shipley MD  Active   Study AHEAD [id number] or placebo 978 mg in sodium chloride 0.9% 250 mL IV [id number]   Kenney Shipley MD  Active   Study AHEAD [id number] or placebo 990 mg in sodium chloride 0.9% 250 mL IV [id number]   Kenney Shipley MD  Active                     Physical Exam  This is a male in no acute distress  Right knee:  The incision is healed, there is no erythema or warmth  No effusion  The extensor mechanism is intact, no tenderness over the distal quadriceps  Range of motion:  5-120 degrees, he has a slight extensor lag  He has excellent quad control     Imaging  MR brain wo IV contrast  Narrative: Interpreted By:  Patrick Gaines,   STUDY:  MR BRAIN WO IV CONTRAST;  10/7/2024 2:31 pm      INDICATION:  Signs/Symptoms:Research.       COMPARISON:  04/22/2024      ACCESSION NUMBER(S):  [id number]      ORDERING CLINICIAN:  DANI GONGORA      TECHNIQUE:  Axial diffusion, axial T2, axial T2 star, axial FLAIR, source bold  imaging, as well as volumetric T1 weighted MRI images of the brain  were obtained as per research protocol.      FINDINGS:  The diffusion weighted images fail to demonstrate evidence of  abnormal diffusion restriction to suggest acute infarction.      There is again evidence of similar mild-to-moderate brain parenchymal  volume loss when compared with the 04/22/2024.      Mild nonspecific white matter changes are again noted within the  cerebral hemispheres bilaterally which while nonspecific, given the  patient's age, likely represent sequelae of more remote small-vessel  ischemic change.      Small foci of encephalomalacia are again noted within the cerebellar  hemispheres bilaterally.      No abnormal blooming dark signal on the T2 star weighted  images to suggest intracranial hemorrhage is noted.      There is minimal mucosal thickening noted within the maxillary  sinuses and scattered ethmoid air cells.      There is opacification of a few mastoid air cells bilaterally.      Impression: MRI performed for research purposes with findings as described above.      MACRO:  None.      Signed by: Patrick Gaines 10/7/2024 2:46 PM  Dictation workstation:   [id number]       Assessment   Approximate 3-month status post right quadriceps tendon repair     Plan  He may discontinue the knee brace  Continue therapy  Follow-up in 2 months for final check  All questions answered    Procedures

## 2024-12-11 NOTE — PROGRESS NOTES
Physical Therapy    Physical Therapy Treatment    Patient Name: Wiliam Keenan  MRN: [id number]  Today's Date: 12/11/2024    Time Entry:   Time Calculation  Start Time: 1018  Stop Time: 1059  Time Calculation (min): 41 min     PT Therapeutic Procedures Time Entry  Therapeutic Exercise Time Entry: 41                 Insurance:  Medicare/[place]  BMN V  PA not req  Visit: 9  POC: 14    Assessment:    Therapeutic exercises performed target improving strength and flexibility for the right lower extremity within protocol restrictions. Patient tolerates activities well, without reports of increased pain. Patient demonstrates continued lack of full ROM in both knee flexion and extension, but these values are approaching goal values. PT continues use of T scope brace despite protocol calling for possible discharge week 7-8 d/t last ortho note instructing to continue to open 10 deg per week. Additional skilled physical therapy would benefit the patient to promote additional functional return and help prevent future impairment.    Plan:  OP PT Plan  PT Plan: Skilled PT  Continue to progress strength and stability of the lower extremity as tolerated and per protocol.  Current Problem  1. Rupture of right quadriceps tendon, subsequent encounter  Follow Up In Physical Therapy                General     General  Reason for Referral: R knee pain s/p quadriceps tendon repair 9/18/24  Referred By: Genaro Sweeney PA-C  Past Medical History Relevant to Rehab: Pt denies significant PMH    Subjective    Patient denies pain in the knee at this time. Reports that he still gets some pain with descending stairs, 3/10 at most.     Precautions  Precautions  STEADI Fall Risk Score (The score of 4 or more indicates an increased risk of falling): 3  Precautions Comment: Per protocol    Pain  Pain Assessment  Pain Assessment: 0-10  0-10 (Numeric) Pain Score: 0 - No pain    Objective     Sx 9/18/24: 12 weeks post op    Per physician note: open brace to  90 deg, progress brace 10 degrees per week  (Brace opened to 115 on 12/11/24)    Protocol calls for brace d/c 7-8 weeks post op. Previous MD note 7 weeks post op says continue to open 10 deg per week    AROM R knee flexion 105  AROM R knee extension +1     Pt able to complete 10 SLR prior to quad lag beginning               Protocol:  REHABILITATION FOLLOWING UNILATERAL QUADRICEPS TENDON REPAIR    I. Immediate Postoperative Phase (Days 1-7)  Goals:  Restore full passive knee extension Diminish pain and joint swelling Restore patellar mobility  Initiate early controlled mobility  *Controlled forces on repair site    Brace: T-Scope brace locked at 0 degrees extension with compression wrap. Sleep and ambulate in brace.    ROM: No ROM    Exercises:  Ankle pumps   Quad Sets  Patellar mobilizations   Hip ABD/ADD  SLR (assisted)    Ice and elevation: 20 minutes of each hour and elevation    Sleep and ambulate in brace locked   Weightbearing: Locked brace with crutches, continue WBAT ROM: No ROM    Initiate gravity eliminate SLR (assisted)    Continue ice and elevation    II.  Maximum Protection Phase (Weeks 2-6)  Goals:  Control forces on healing   Gradually increase knee flexion   Restore full PROM  Restore patellar mobility   Retard muscle atrophy  Week 2:    Brace: Continue use of locked brace (4-6 weeks) Sleep in brace (4-6 weeks)    Weightbearing: Weightbearing as tolerated    ROM: PROM knee flexion only 0-30 degrees Full passive knee extension  Patellar mobilization    Exercises:  Stim to quads   Quad sets   Ankle pumps   Hip ABD/ADD  Gravity eliminated SLR flexion      Week 3:  Continue above mentioned.  Continue use of crutches and WB to 75-80% body weight     Week 4:  Continue all exercises listed above.     Weightbearing: Weightbearing as tolerated    ROM: PROM knee flexion only 0-60 degrees    Exercises:  Initiate weight shifting Initiate proprioception drills    Week  5-6:  Brace: Unlock brace for ambulation at  "week 6    Weightbearing: Discontinue use of crutches at 5 weeks post op    ROM: PROM 0-60 degrees    Exercises:  Initiate pool program  Active knee extension 90-30 degrees  Multi-angle isometrics knee extension (sub max) Mini- squats  Continue all exercises listed above  Ill. Moderate Protection Phase (Weeks 7-16)    Goals:    Control forces during ambulation and ADLs   Progress knee flexion ROM  Improve lower extremity muscular strength   Restore limb confidence and function    Weeks 7-10:  Brace:   Use postop brace unlocked for ambulation until 7-8 weeks (or until determined safe)  Discontinue sleeping in brace  Range of motion:     Week 7: 0-95/100°  Week 8: 0-100/105°  Week 10: 0-115°    Exercises:   Gradually increase muscular strength  Straight leg raises (flexion)   Hip abduction/adduction  Knee extension 90-0  1/2 squats   Leg press   Wall squats   Front lunges  Lateral lunges   Calf raises  Hamstring curls (restricted ROM)  Proprioception drills  Bicycle  Pool program  Weeks 12-16:  Range of motion: Week 12: 0-125°    Exercises:   Continue all exercises listed above  Initiate lateral step-ups   Initiate front step-downs   Initiate backward lunges   Walking program  No sports      IV. Light Activity Phase (Months 4-6)    Goals:   Enhancement of strength, endurance  Initiate functional activities  Improve tensile strength properties of tendon        Treatments:  Therapeutic exercises:     Upright bike L.1.5 x5' SH 7  Supine ball squeeze 20x5\" NT  Multi angle knee extension submax isometrics 10x5\" ea NT  Active knee extension 0-90 2x10 (eccentric focus) NT  SL hip abduction (brace on and locked) 2x10 NT  SLR (outside of brace) 2x10 NT  Weight shifting lateral (brace locked) 15x3\" ea NT  Standing 3 way hip x 20 B  NT  HR/TR x20ea NT  HS stretch at step 3x30\"   Calf stretch at step 3x30\"   Mini squats 2x10 NT  Lunge on step 15x5\" ea   Lateral 1/2 lung on step 2x10 NT  Heel slides with strap x15  Leg press 2 pl " "3x10  Ball on wall squat 2x10  Step ups fwd/lat 6\" x20 ea  Fwd step downs 4\" x20    Brace donned for all weight bearing exercises     OP EDUCATION:     10/21/24:  Exercises  - Supine Quad Set  - 2 x daily - 7 x weekly - 2 sets - 10 reps - 5 sec hold  - Seated Knee Extension Stretch with Chair  - 2 x daily - 7 x weekly - 1 sets - 1 reps - 3-5 min hold  - Supine Single Leg Ankle Pumps  - 2 x daily - 7 x weekly - 2 sets - 10 reps  11/7/24:  Exercises  - Seated Hamstring Stretch  - 2 x daily - 7 x weekly - 1 sets - 3 reps - 30 sec hold  - Long Sitting Calf Stretch with Strap  - 2 x daily - 7 x weekly - 1 sets - 3 reps - 30 sec hold  11/12/24:  Exercises  - Prone Knee Extension Hang  - 2 x daily - 7 x weekly - 1 sets - 1 reps - 5 min hold  Goals:  By discharge:  1. Patient will report and demonstrate independence with current HEP  2. Patient will demonstrate AROM of the R knee 0-120 to improve their ability to ambulate, negotiate stairs, and squat without restriction  3. Patient will demonstrate the ability to ascend/descend one flight of stairs reciprocally and without an increase in pain to improve function within the home and the community  4. Patient will demonstrate gross hip and knee strength of >/= 4+/5 to improve the ability to ambulate, squat, and lift without restrictions  5. Patient will demonstrate an increase in Lower Extremity Functional Scale score by 8 points to 62/80 to meet established MCID (baseline 10/21/24 54/80)  6. Patient will demonstrate the ability to perform 15 bodyweight squats while maintaining proper hip and knee mechanics and without pain in the knee exceeding 2/10  7. Patient will report pain of 0/10 at rest, and no greater than 2/10 with any activity including standing, walking, stairs, and squatting  8. Patient will demonstrate right quadriceps and hamstring strength >/= 85% of the contralateral side measured with a handheld dynamometer to indicate improved functional strength and " stability of the knee and indicate potential return to recreation activities   9. Patient will demonstrate the ability to ambulate >/= 150' outside of the brace, and without any major deviations in gait to indicate improved mobility within the home and the community   10. Patient will demonstrate the ability to perform 15 forward and 15 backward lunges while maintaining proper hip and knee mechanics and without an increase in pain to indicate improved functional strength of the right lower extremity

## 2024-12-12 ENCOUNTER — APPOINTMENT (OUTPATIENT)
Dept: ORTHOPEDIC SURGERY | Facility: CLINIC | Age: 74
End: 2024-12-12
Payer: MEDICARE

## 2024-12-12 DIAGNOSIS — Z09 SURGICAL FOLLOW-UP CARE: Primary | ICD-10-CM

## 2024-12-12 PROCEDURE — 99024 POSTOP FOLLOW-UP VISIT: CPT | Performed by: ORTHOPAEDIC SURGERY

## 2024-12-17 ENCOUNTER — TREATMENT (OUTPATIENT)
Dept: PHYSICAL THERAPY | Facility: CLINIC | Age: 74
End: 2024-12-17
Payer: MEDICARE

## 2024-12-17 DIAGNOSIS — Z00.6 CLINICAL TRIAL PARTICIPANT: ICD-10-CM

## 2024-12-17 DIAGNOSIS — S76.111D RUPTURE OF RIGHT QUADRICEPS TENDON, SUBSEQUENT ENCOUNTER: ICD-10-CM

## 2024-12-17 DIAGNOSIS — Z00.6 CLINICAL TRIAL PARTICIPANT: Primary | ICD-10-CM

## 2024-12-17 DIAGNOSIS — Z00.6 RESEARCH STUDY PATIENT: Primary | ICD-10-CM

## 2024-12-17 PROCEDURE — 97110 THERAPEUTIC EXERCISES: CPT | Mod: GP | Performed by: PHYSICAL THERAPIST

## 2024-12-17 ASSESSMENT — PAIN - FUNCTIONAL ASSESSMENT: PAIN_FUNCTIONAL_ASSESSMENT: 0-10

## 2024-12-17 NOTE — PROGRESS NOTES
Physical Therapy    Physical Therapy Treatment    Patient Name: Wiliam Keenan  MRN: [id number]  Today's Date: 12/17/2024    Time Entry:   Time Calculation  Start Time: 1015  Stop Time: 1058  Time Calculation (min): 43 min     PT Therapeutic Procedures Time Entry  Therapeutic Exercise Time Entry: 43                 Insurance:  Medicare/[place]  BMN V  PA not req  Visit: 10  POC: 16    Assessment:    Patient has completed 10 therapy visits up to this point, and have met 3/10 established therapy goals. The patient has progressed well, and has shown improvements in pain intensity, strength, flexibility, and overall mobility/function. At this time, the patient remains below functional baseline with weakness of the right lower extremity, decreased ROM of the right knee, and decreased mechanics of the right lower extremity. These deficits impair the patient's ability to perform functional movements such as squatting/lunging, negotiate steep stairs, and participate in recreation activities. They would benefit from continued skilled therapy at this time to continue to promote functional gains and a return to prior level of function. PT recommends an additional 6 visits performed following this visit.     Plan:  OP PT Plan  Treatment/Interventions: Aquatic therapy, Cryotherapy, Education/ Instruction, Electrical stimulation, Hot pack, Manual therapy, Neuromuscular re-education, Self care/ home management, Taping techniques, Therapeutic activities, Therapeutic exercises, Vasopneumatic device  PT Plan: Skilled PT  PT Frequency: 1 time per week  Duration: 6 additional visits  Onset Date: 09/18/24  Certification Period Start Date: 12/17/24  Certification Period End Date: 03/17/25  Rehab Potential: Good  Plan of Care Agreement: Patient  Continue to progress strength and stability of the lower extremity as tolerated and per protocol.  Current Problem  1. Rupture of right quadriceps tendon, subsequent encounter  Follow Up In Physical  Therapy                  General     General  Reason for Referral: R knee pain s/p quadriceps tendon repair 9/18/24  Referred By: Genaro Sweeney PA-C  Past Medical History Relevant to Rehab: Pt denies significant PMH    Subjective    Patient reports that his knee has been feeling good. Denies pain at this time. Denies any pain in the knee in the last three weeks. Saw the surgeon last week who was happy with his progress thus far.     Precautions  Precautions  STEADI Fall Risk Score (The score of 4 or more indicates an increased risk of falling): 3  Precautions Comment: Per protocol    Pain  Pain Assessment  Pain Assessment: 0-10    Objective     Sx 9/18/24: 12 weeks, 6 days post op    Per physician note: discontinue brace    Knee AROM (*indicates pain): L from IE   Flexion R 119, L 126  Extension R +1, L 0    LE strength (*indicates pain): L from IE   Knee flexion R 4/5, L 4+/5  Knee extension R 4/5, L 4+/5  Hip flexion R 4/5, L 4/5  Hip abd R 4/5, L 4/5    Muscle strength (lbs)  Quadriceps R 42.0, L 46.2 (90.9%)  Hamstring R 40.8, L 50.1 (81.4%)      Gait: amb with slight lack of full knee extension at initial contact and during stance phase, good gait speed and stride/step length    Lunges: Not assessed this date    Squats: pt is able to complete 15 bodyweight squats, but increased lateral shift over the LLE to reduce stress on RLE    Stairs: able to ascend and descend one flight of stairs reciprocally and with good eccentric control    LEFS: 62/80          Protocol:  REHABILITATION FOLLOWING UNILATERAL QUADRICEPS TENDON REPAIR    I. Immediate Postoperative Phase (Days 1-7)  Goals:  Restore full passive knee extension Diminish pain and joint swelling Restore patellar mobility  Initiate early controlled mobility  *Controlled forces on repair site    Brace: T-Scope brace locked at 0 degrees extension with compression wrap. Sleep and ambulate in brace.    ROM: No ROM    Exercises:  Ankle pumps   Quad Sets  Patellar  mobilizations   Hip ABD/ADD  SLR (assisted)    Ice and elevation: 20 minutes of each hour and elevation    Sleep and ambulate in brace locked   Weightbearing: Locked brace with crutches, continue WBAT ROM: No ROM    Initiate gravity eliminate SLR (assisted)    Continue ice and elevation    II.  Maximum Protection Phase (Weeks 2-6)  Goals:  Control forces on healing   Gradually increase knee flexion   Restore full PROM  Restore patellar mobility   Retard muscle atrophy  Week 2:    Brace: Continue use of locked brace (4-6 weeks) Sleep in brace (4-6 weeks)    Weightbearing: Weightbearing as tolerated    ROM: PROM knee flexion only 0-30 degrees Full passive knee extension  Patellar mobilization    Exercises:  Stim to quads   Quad sets   Ankle pumps   Hip ABD/ADD  Gravity eliminated SLR flexion      Week 3:  Continue above mentioned.  Continue use of crutches and WB to 75-80% body weight     Week 4:  Continue all exercises listed above.     Weightbearing: Weightbearing as tolerated    ROM: PROM knee flexion only 0-60 degrees    Exercises:  Initiate weight shifting Initiate proprioception drills    Week  5-6:  Brace: Unlock brace for ambulation at week 6    Weightbearing: Discontinue use of crutches at 5 weeks post op    ROM: PROM 0-60 degrees    Exercises:  Initiate pool program  Active knee extension 90-30 degrees  Multi-angle isometrics knee extension (sub max) Mini- squats  Continue all exercises listed above  Ill. Moderate Protection Phase (Weeks 7-16)    Goals:    Control forces during ambulation and ADLs   Progress knee flexion ROM  Improve lower extremity muscular strength   Restore limb confidence and function    Weeks 7-10:  Brace:   Use postop brace unlocked for ambulation until 7-8 weeks (or until determined safe)  Discontinue sleeping in brace  Range of motion:     Week 7: 0-95/100°  Week 8: 0-100/105°  Week 10: 0-115°    Exercises:   Gradually increase muscular strength  Straight leg raises (flexion)   Hip  "abduction/adduction  Knee extension 90-0  1/2 squats   Leg press   Wall squats   Front lunges  Lateral lunges   Calf raises  Hamstring curls (restricted ROM)  Proprioception drills  Bicycle  Pool program  Weeks 12-16:  Range of motion: Week 12: 0-125°    Exercises:   Continue all exercises listed above  Initiate lateral step-ups   Initiate front step-downs   Initiate backward lunges   Walking program  No sports      IV. Light Activity Phase (Months 4-6)    Goals:   Enhancement of strength, endurance  Initiate functional activities  Improve tensile strength properties of tendon        Treatments:  Therapeutic exercises:   Obj measures and goals review  Upright bike L.1.5 x5' SH 5  SL hip abduction (brace on and locked) 2x10 NT  SLR (outside of brace) 2x10 NT  Weight shifting lateral (brace locked) 15x3\" ea NT  Standing 3 way hip x 20 B  NT  HR/TR x20ea NT  HS stretch at step 3x30\"   Calf stretch at step 3x30\"   Mini squats 2x10 NT  Fwd/lateral lunges x15 ea   Lateral 1/2 lung on step 2x10 NT  Heel slides with strap x15  Leg press 2 pl 3x10  Ball on wall squat 2x10 NT  Step ups fwd/lat 6\" x20 ea  Fwd step downs 4\" x20  Prone quad set 15x5\"   Prone hang 3# x3'    OP EDUCATION:     10/21/24:  Exercises  - Supine Quad Set  - 2 x daily - 7 x weekly - 2 sets - 10 reps - 5 sec hold  - Seated Knee Extension Stretch with Chair  - 2 x daily - 7 x weekly - 1 sets - 1 reps - 3-5 min hold  - Supine Single Leg Ankle Pumps  - 2 x daily - 7 x weekly - 2 sets - 10 reps  11/7/24:  Exercises  - Seated Hamstring Stretch  - 2 x daily - 7 x weekly - 1 sets - 3 reps - 30 sec hold  - Long Sitting Calf Stretch with Strap  - 2 x daily - 7 x weekly - 1 sets - 3 reps - 30 sec hold  11/12/24:  Exercises  - Prone Knee Extension Hang  - 2 x daily - 7 x weekly - 1 sets - 1 reps - 5 min hold  Goals:  By discharge:  1. Patient will report and demonstrate independence with current HEP ONGOING  2. Patient will demonstrate AROM of the R knee 0-120 to " improve their ability to ambulate, negotiate stairs, and squat without restriction PROGRESSING  3. Patient will demonstrate the ability to ascend/descend one flight of stairs reciprocally and without an increase in pain to improve function within the home and the community MET  4. Patient will demonstrate gross hip and knee strength of >/= 4+/5 to improve the ability to ambulate, squat, and lift without restrictions PROGRESSING  5. Patient will demonstrate an increase in Lower Extremity Functional Scale score by 8 points to 62/80 to meet established MCID (baseline 10/21/24 54/80) MET  6. Patient will demonstrate the ability to perform 15 bodyweight squats while maintaining proper hip and knee mechanics and without pain in the knee exceeding 2/10 PARTIALLY MET  7. Patient will report pain of 0/10 at rest, and no greater than 2/10 with any activity including standing, walking, stairs, and squatting MET  8. Patient will demonstrate right quadriceps and hamstring strength >/= 85% of the contralateral side measured with a handheld dynamometer to indicate improved functional strength and stability of the knee and indicate potential return to recreation activities  PARTIALLY MET  9. Patient will demonstrate the ability to ambulate >/= 150' outside of the brace, and without any major deviations in gait to indicate improved mobility within the home and the community  PROGRESSING  10. Patient will demonstrate the ability to perform 15 forward and 15 backward lunges while maintaining proper hip and knee mechanics and without an increase in pain to indicate improved functional strength of the right lower extremity NOT ASSESSED

## 2024-12-23 ENCOUNTER — TREATMENT (OUTPATIENT)
Dept: PHYSICAL THERAPY | Facility: CLINIC | Age: 74
End: 2024-12-23
Payer: MEDICARE

## 2024-12-23 DIAGNOSIS — S76.111D RUPTURE OF RIGHT QUADRICEPS TENDON, SUBSEQUENT ENCOUNTER: Primary | ICD-10-CM

## 2024-12-23 PROCEDURE — 97110 THERAPEUTIC EXERCISES: CPT | Mod: GP | Performed by: PHYSICAL THERAPIST

## 2024-12-23 ASSESSMENT — PAIN - FUNCTIONAL ASSESSMENT: PAIN_FUNCTIONAL_ASSESSMENT: 0-10

## 2024-12-23 ASSESSMENT — PAIN SCALES - GENERAL: PAINLEVEL_OUTOF10: 0 - NO PAIN

## 2024-12-23 NOTE — PROGRESS NOTES
Physical Therapy    Physical Therapy Treatment    Patient Name: Wiliam Keenan  MRN: [id number]  Today's Date: 12/23/2024    Time Entry:   Time Calculation  Start Time: 1020  Stop Time: 1059  Time Calculation (min): 39 min     PT Therapeutic Procedures Time Entry  Therapeutic Exercise Time Entry: 39                 Insurance:  Medicare/[employer]  BMN V  PA not req  Visit: 11  POC: 16    Assessment:  Treatment session emphasizes therapeutic exercises targeting improving flexibility, strength, and stability of the knee joint within pain tolerance and  protocol restriction. Patient tolerates treatment well, without reports of increased pain in the knee joint. Patient's ROM is improving, but remains below his baseline and goal levels. Continued stiffness in knee extension, and weakness of the lower extremity places the patient at a decreased functional level, and increases risks of future injury/impairment. Additional skilled PT warranted at this time focusing on the above mentioned impairments.       Plan:  OP PT Plan  PT Plan: Skilled PT  Rehab Potential: Good  Plan of Care Agreement: Patient  Continue to progress strength and stability of the lower extremity as tolerated and per protocol.  Current Problem  1. Rupture of right quadriceps tendon, subsequent encounter                      General     General  Reason for Referral: R knee pain s/p quadriceps tendon repair 9/18/24  Referred By: Genaro Sweeney PA-C  Past Medical History Relevant to Rehab: Pt denies significant PMH    Subjective    Pt reports that his knee has been feeling good. He has been working on his stretches at home.    Precautions  Precautions  STEADI Fall Risk Score (The score of 4 or more indicates an increased risk of falling): 3  Precautions Comment: Per protocol    Pain  Pain Assessment  Pain Assessment: 0-10  0-10 (Numeric) Pain Score: 0 - No pain    Objective     Sx 9/18/24: 13 weeks, 5 days post op    Per physician note: discontinue brace    R knee  AROM:   3-119          Protocol:  REHABILITATION FOLLOWING UNILATERAL QUADRICEPS TENDON REPAIR    I. Immediate Postoperative Phase (Days 1-7)  Goals:  Restore full passive knee extension Diminish pain and joint swelling Restore patellar mobility  Initiate early controlled mobility  *Controlled forces on repair site    Brace: T-Scope brace locked at 0 degrees extension with compression wrap. Sleep and ambulate in brace.    ROM: No ROM    Exercises:  Ankle pumps   Quad Sets  Patellar mobilizations   Hip ABD/ADD  SLR (assisted)    Ice and elevation: 20 minutes of each hour and elevation    Sleep and ambulate in brace locked   Weightbearing: Locked brace with crutches, continue WBAT ROM: No ROM    Initiate gravity eliminate SLR (assisted)    Continue ice and elevation    II.  Maximum Protection Phase (Weeks 2-6)  Goals:  Control forces on healing   Gradually increase knee flexion   Restore full PROM  Restore patellar mobility   Retard muscle atrophy  Week 2:    Brace: Continue use of locked brace (4-6 weeks) Sleep in brace (4-6 weeks)    Weightbearing: Weightbearing as tolerated    ROM: PROM knee flexion only 0-30 degrees Full passive knee extension  Patellar mobilization    Exercises:  Stim to quads   Quad sets   Ankle pumps   Hip ABD/ADD  Gravity eliminated SLR flexion      Week 3:  Continue above mentioned.  Continue use of crutches and WB to 75-80% body weight     Week 4:  Continue all exercises listed above.     Weightbearing: Weightbearing as tolerated    ROM: PROM knee flexion only 0-60 degrees    Exercises:  Initiate weight shifting Initiate proprioception drills    Week  5-6:  Brace: Unlock brace for ambulation at week 6    Weightbearing: Discontinue use of crutches at 5 weeks post op    ROM: PROM 0-60 degrees    Exercises:  Initiate pool program  Active knee extension 90-30 degrees  Multi-angle isometrics knee extension (sub max) Mini- squats  Continue all exercises listed above  Ill. Moderate Protection Phase  "(Weeks 7-16)    Goals:    Control forces during ambulation and ADLs   Progress knee flexion ROM  Improve lower extremity muscular strength   Restore limb confidence and function    Weeks 7-10:  Brace:   Use postop brace unlocked for ambulation until 7-8 weeks (or until determined safe)  Discontinue sleeping in brace  Range of motion:     Week 7: 0-95/100°  Week 8: 0-100/105°  Week 10: 0-115°    Exercises:   Gradually increase muscular strength  Straight leg raises (flexion)   Hip abduction/adduction  Knee extension 90-0  1/2 squats   Leg press   Wall squats   Front lunges  Lateral lunges   Calf raises  Hamstring curls (restricted ROM)  Proprioception drills  Bicycle  Pool program  Weeks 12-16:  Range of motion: Week 12: 0-125°    Exercises:   Continue all exercises listed above  Initiate lateral step-ups   Initiate front step-downs   Initiate backward lunges   Walking program  No sports      IV. Light Activity Phase (Months 4-6)    Goals:   Enhancement of strength, endurance  Initiate functional activities  Improve tensile strength properties of tendon        Treatments:  Therapeutic exercises:   Obj measures and goals review  Upright bike L.1.5 x6' SH 4  SL hip abduction (brace on and locked) 2x10 NT  SLR (outside of brace) 2x10 NT  Weight shifting lateral (brace locked) 15x3\" ea NT  Standing 3 way hip x 20 B  NT  HR/TR x20ea NT  HS stretch at step 3x30\"   Calf stretch at step 3x30\"   Mini squats 2x10 NT  Fwd/lateral lunges x15 ea   Lateral 1/2 lung on step 2x10 NT  Heel slides with strap x15  Knee flexion stretch on step 15x5\" ea   Leg press 2 pl 3x10  Ball on wall squat 2x10 NT  Step ups fwd/lat 6\" x20 ea  Fwd step downs 4\" x20 NT  Prone quad set 15x5\"   Prone hang 3# x3'  Fwd lunges x10 ea    OP EDUCATION:     10/21/24:  Exercises  - Supine Quad Set  - 2 x daily - 7 x weekly - 2 sets - 10 reps - 5 sec hold  - Seated Knee Extension Stretch with Chair  - 2 x daily - 7 x weekly - 1 sets - 1 reps - 3-5 min hold  - " Supine Single Leg Ankle Pumps  - 2 x daily - 7 x weekly - 2 sets - 10 reps  11/7/24:  Exercises  - Seated Hamstring Stretch  - 2 x daily - 7 x weekly - 1 sets - 3 reps - 30 sec hold  - Long Sitting Calf Stretch with Strap  - 2 x daily - 7 x weekly - 1 sets - 3 reps - 30 sec hold  11/12/24:  Exercises  - Prone Knee Extension Hang  - 2 x daily - 7 x weekly - 1 sets - 1 reps - 5 min hold  Goals:  By discharge:  1. Patient will report and demonstrate independence with current HEP ONGOING  2. Patient will demonstrate AROM of the R knee 0-120 to improve their ability to ambulate, negotiate stairs, and squat without restriction PROGRESSING  3. Patient will demonstrate the ability to ascend/descend one flight of stairs reciprocally and without an increase in pain to improve function within the home and the community MET  4. Patient will demonstrate gross hip and knee strength of >/= 4+/5 to improve the ability to ambulate, squat, and lift without restrictions PROGRESSING  5. Patient will demonstrate an increase in Lower Extremity Functional Scale score by 8 points to 62/80 to meet established MCID (baseline 10/21/24 54/80) MET  6. Patient will demonstrate the ability to perform 15 bodyweight squats while maintaining proper hip and knee mechanics and without pain in the knee exceeding 2/10 PARTIALLY MET  7. Patient will report pain of 0/10 at rest, and no greater than 2/10 with any activity including standing, walking, stairs, and squatting MET  8. Patient will demonstrate right quadriceps and hamstring strength >/= 85% of the contralateral side measured with a handheld dynamometer to indicate improved functional strength and stability of the knee and indicate potential return to recreation activities  PARTIALLY MET  9. Patient will demonstrate the ability to ambulate >/= 150' outside of the brace, and without any major deviations in gait to indicate improved mobility within the home and the community  PROGRESSING  10.  Patient will demonstrate the ability to perform 15 forward and 15 backward lunges while maintaining proper hip and knee mechanics and without an increase in pain to indicate improved functional strength of the right lower extremity NOT ASSESSED

## 2024-12-24 ENCOUNTER — APPOINTMENT (OUTPATIENT)
Dept: PHYSICAL THERAPY | Facility: CLINIC | Age: 74
End: 2024-12-24
Payer: MEDICARE

## 2024-12-27 ENCOUNTER — APPOINTMENT (OUTPATIENT)
Dept: RESEARCH | Facility: HOSPITAL | Age: 74
End: 2024-12-27
Payer: MEDICARE

## 2024-12-27 NOTE — ADDENDUM NOTE
Encounter addended by: Carmen BarajasD on: 12/27/2024 1:45 PM   Actions taken: Diagnosis association updated, Order list changed, Verified order dispensed

## 2024-12-31 ENCOUNTER — TREATMENT (OUTPATIENT)
Dept: PHYSICAL THERAPY | Facility: CLINIC | Age: 74
End: 2024-12-31
Payer: MEDICARE

## 2024-12-31 DIAGNOSIS — S76.111D RUPTURE OF RIGHT QUADRICEPS TENDON, SUBSEQUENT ENCOUNTER: Primary | ICD-10-CM

## 2024-12-31 PROCEDURE — 97110 THERAPEUTIC EXERCISES: CPT | Mod: GP | Performed by: PHYSICAL THERAPIST

## 2024-12-31 ASSESSMENT — PAIN SCALES - GENERAL: PAINLEVEL_OUTOF10: 0 - NO PAIN

## 2024-12-31 ASSESSMENT — PAIN - FUNCTIONAL ASSESSMENT: PAIN_FUNCTIONAL_ASSESSMENT: 0-10

## 2024-12-31 NOTE — PROGRESS NOTES
Physical Therapy    Physical Therapy Treatment    Patient Name: Wiliam Keenan  MRN: [id number]  Today's Date: 12/31/2024    Time Entry:   Time Calculation  Start Time: 1019  Stop Time: 1059  Time Calculation (min): 40 min     PT Therapeutic Procedures Time Entry  Therapeutic Exercise Time Entry: 40                 Insurance:  Medicare/[employer]  BMN V  PA not req  Visit: 12  POC: 16    Assessment:  Therapeutic exercises performed target improving strength and flexibility for the right lower extremity within pain tolerance. Patient tolerates activities well, without reports of increased pain, but increased muscular fatigue as expected for given activities. Patient demonstrates continued deficits in knee extension ROM, and PT stresses importance of working on HEP to improve outside of PT sessions. Additional skilled physical therapy would benefit the patient to promote additional functional return and help prevent future impairment. Patient expresses desire to decrease frequency of visits to once every other week. PT reinforces adherence to HEP if he does decrease frequency prior to next f/u with surgeon.       Plan:  OP PT Plan  PT Plan: Skilled PT  Rehab Potential: Good  Plan of Care Agreement: Patient  Continue to progress strength and stability of the lower extremity as tolerated and per protocol      Current Problem  1. Rupture of right quadriceps tendon, subsequent encounter                        General     General  Reason for Referral: R knee pain s/p quadriceps tendon repair 9/18/24  Referred By: Genaro Sweeney PA-C  Past Medical History Relevant to Rehab: Pt denies significant PMH    Subjective    Patient reports that the knee has been feeling good. Denies pain at start of session. Compliant with HEP    Precautions  Precautions  STEADI Fall Risk Score (The score of 4 or more indicates an increased risk of falling): 3  Precautions Comment: Per protocol    Pain  Pain Assessment  Pain Assessment: 0-10  0-10  (Numeric) Pain Score: 0 - No pain    Objective     Sx 9/18/24: 14 weeks, 5 days post op    R knee AROM:   3-119          Protocol:  REHABILITATION FOLLOWING UNILATERAL QUADRICEPS TENDON REPAIR    I. Immediate Postoperative Phase (Days 1-7)  Goals:  Restore full passive knee extension Diminish pain and joint swelling Restore patellar mobility  Initiate early controlled mobility  *Controlled forces on repair site    Brace: T-Scope brace locked at 0 degrees extension with compression wrap. Sleep and ambulate in brace.    ROM: No ROM    Exercises:  Ankle pumps   Quad Sets  Patellar mobilizations   Hip ABD/ADD  SLR (assisted)    Ice and elevation: 20 minutes of each hour and elevation    Sleep and ambulate in brace locked   Weightbearing: Locked brace with crutches, continue WBAT ROM: No ROM    Initiate gravity eliminate SLR (assisted)    Continue ice and elevation    II.  Maximum Protection Phase (Weeks 2-6)  Goals:  Control forces on healing   Gradually increase knee flexion   Restore full PROM  Restore patellar mobility   Retard muscle atrophy  Week 2:    Brace: Continue use of locked brace (4-6 weeks) Sleep in brace (4-6 weeks)    Weightbearing: Weightbearing as tolerated    ROM: PROM knee flexion only 0-30 degrees Full passive knee extension  Patellar mobilization    Exercises:  Stim to quads   Quad sets   Ankle pumps   Hip ABD/ADD  Gravity eliminated SLR flexion      Week 3:  Continue above mentioned.  Continue use of crutches and WB to 75-80% body weight     Week 4:  Continue all exercises listed above.     Weightbearing: Weightbearing as tolerated    ROM: PROM knee flexion only 0-60 degrees    Exercises:  Initiate weight shifting Initiate proprioception drills    Week  5-6:  Brace: Unlock brace for ambulation at week 6    Weightbearing: Discontinue use of crutches at 5 weeks post op    ROM: PROM 0-60 degrees    Exercises:  Initiate pool program  Active knee extension 90-30 degrees  Multi-angle isometrics knee  "extension (sub max) Mini- squats  Continue all exercises listed above  Ill. Moderate Protection Phase (Weeks 7-16)    Goals:    Control forces during ambulation and ADLs   Progress knee flexion ROM  Improve lower extremity muscular strength   Restore limb confidence and function    Weeks 7-10:  Brace:   Use postop brace unlocked for ambulation until 7-8 weeks (or until determined safe)  Discontinue sleeping in brace  Range of motion:     Week 7: 0-95/100°  Week 8: 0-100/105°  Week 10: 0-115°    Exercises:   Gradually increase muscular strength  Straight leg raises (flexion)   Hip abduction/adduction  Knee extension 90-0  1/2 squats   Leg press   Wall squats   Front lunges  Lateral lunges   Calf raises  Hamstring curls (restricted ROM)  Proprioception drills  Bicycle  Pool program  Weeks 12-16:  Range of motion: Week 12: 0-125°    Exercises:   Continue all exercises listed above  Initiate lateral step-ups   Initiate front step-downs   Initiate backward lunges   Walking program  No sports      IV. Light Activity Phase (Months 4-6)    Goals:   Enhancement of strength, endurance  Initiate functional activities  Improve tensile strength properties of tendon        Treatments:  Therapeutic exercises:   Upright bike L.1.5 x6' SH 4  SL hip abduction (brace on and locked) 2x10 NT  SLR (outside of brace) 2x10 NT  Weight shifting lateral (brace locked) 15x3\" ea NT  Standing 3 way hip x 20 B  NT  HR/TR x20ea NT  HS stretch at step 3x30\"   Calf stretch at step 3x30\"   Mini squats 2x10 NT  Lateral 1/2 lung on step 2x10 NT  Heel slides with strap x15 NT  Knee flexion stretch on step 15x5\" ea   Leg press 3 pl 3x10  Ball on wall squat 2x10 NT  Step ups fwd/lat 6\" x20 ea  Fwd step downs 6\" x20   Prone quad set 15x5\" NT  Prone hang 3# x3'  Fwd lunges x10 ea  Lat lunge x10 ea     OP EDUCATION:     10/21/24:  Exercises  - Supine Quad Set  - 2 x daily - 7 x weekly - 2 sets - 10 reps - 5 sec hold  - Seated Knee Extension Stretch with " Chair  - 2 x daily - 7 x weekly - 1 sets - 1 reps - 3-5 min hold  - Supine Single Leg Ankle Pumps  - 2 x daily - 7 x weekly - 2 sets - 10 reps  11/7/24:  Exercises  - Seated Hamstring Stretch  - 2 x daily - 7 x weekly - 1 sets - 3 reps - 30 sec hold  - Long Sitting Calf Stretch with Strap  - 2 x daily - 7 x weekly - 1 sets - 3 reps - 30 sec hold  11/12/24:  Exercises  - Prone Knee Extension Hang  - 2 x daily - 7 x weekly - 1 sets - 1 reps - 5 min hold  Goals:  By discharge:  1. Patient will report and demonstrate independence with current HEP ONGOING  2. Patient will demonstrate AROM of the R knee 0-120 to improve their ability to ambulate, negotiate stairs, and squat without restriction PROGRESSING  3. Patient will demonstrate the ability to ascend/descend one flight of stairs reciprocally and without an increase in pain to improve function within the home and the community MET  4. Patient will demonstrate gross hip and knee strength of >/= 4+/5 to improve the ability to ambulate, squat, and lift without restrictions PROGRESSING  5. Patient will demonstrate an increase in Lower Extremity Functional Scale score by 8 points to 62/80 to meet established MCID (baseline 10/21/24 54/80) MET  6. Patient will demonstrate the ability to perform 15 bodyweight squats while maintaining proper hip and knee mechanics and without pain in the knee exceeding 2/10 PARTIALLY MET  7. Patient will report pain of 0/10 at rest, and no greater than 2/10 with any activity including standing, walking, stairs, and squatting MET  8. Patient will demonstrate right quadriceps and hamstring strength >/= 85% of the contralateral side measured with a handheld dynamometer to indicate improved functional strength and stability of the knee and indicate potential return to recreation activities  PARTIALLY MET  9. Patient will demonstrate the ability to ambulate >/= 150' outside of the brace, and without any major deviations in gait to indicate  improved mobility within the home and the community  PROGRESSING  10. Patient will demonstrate the ability to perform 15 forward and 15 backward lunges while maintaining proper hip and knee mechanics and without an increase in pain to indicate improved functional strength of the right lower extremity NOT ASSESSED

## 2025-01-07 ENCOUNTER — APPOINTMENT (OUTPATIENT)
Dept: PHYSICAL THERAPY | Facility: CLINIC | Age: 75
End: 2025-01-07
Payer: MEDICARE

## 2025-01-14 ENCOUNTER — APPOINTMENT (OUTPATIENT)
Dept: PHYSICAL THERAPY | Facility: CLINIC | Age: 75
End: 2025-01-14
Payer: MEDICARE

## 2025-01-15 ENCOUNTER — TREATMENT (OUTPATIENT)
Dept: PHYSICAL THERAPY | Facility: CLINIC | Age: 75
End: 2025-01-15
Payer: MEDICARE

## 2025-01-15 DIAGNOSIS — S76.111D RUPTURE OF RIGHT QUADRICEPS TENDON, SUBSEQUENT ENCOUNTER: Primary | ICD-10-CM

## 2025-01-15 PROCEDURE — 97110 THERAPEUTIC EXERCISES: CPT | Mod: GP | Performed by: PHYSICAL THERAPIST

## 2025-01-15 ASSESSMENT — PAIN - FUNCTIONAL ASSESSMENT: PAIN_FUNCTIONAL_ASSESSMENT: 0-10

## 2025-01-15 ASSESSMENT — PAIN SCALES - GENERAL: PAINLEVEL_OUTOF10: 0 - NO PAIN

## 2025-01-15 NOTE — PROGRESS NOTES
Physical Therapy    Physical Therapy Treatment    Patient Name: Wiliam Keenan  MRN: [id number]  Today's Date: 1/15/2025    Time Entry:   Time Calculation  Start Time: 0846  Stop Time: 0931  Time Calculation (min): 45 min     PT Therapeutic Procedures Time Entry  Manual Therapy Time Entry: 3  Therapeutic Exercise Time Entry: 41                 Insurance:  Medicare/[place]  BMN V  PA not req  Visit: 13  POC: 16    Assessment:  Treatment session emphasizes therapeutic exercises targeting improving flexibility and strength of the right lower extremity within protocol and pain tolerance. Patient tolerates treatment well, without reports of increased pain. Manual therapy introduced due to patient's difficulties obtaining terminal knee extension. Continued weakness and decreased ROM places the patient at a decreased functional level, and increases risks of future injury/impairment. Additional skilled PT warranted at this time focusing on the above mentioned impairments.       Plan:  OP PT Plan  PT Plan: Skilled PT  Rehab Potential: Good  Plan of Care Agreement: Patient  Continue to progress strength and stability of the lower extremity as tolerated and per protocol      Current Problem  1. Rupture of right quadriceps tendon, subsequent encounter                          General     General  Reason for Referral: R knee pain s/p quadriceps tendon repair 9/18/24  Referred By: Genaro Sweeney PA-C  Past Medical History Relevant to Rehab: Pt denies significant PMH    Subjective    Pt denies pain at this time. Notes that his knee has been feeling good, and he remains complaint with his HEP.     Precautions  Precautions  STEADI Fall Risk Score (The score of 4 or more indicates an increased risk of falling): 3  Precautions Comment: Per protocol    Pain  Pain Assessment  Pain Assessment: 0-10  0-10 (Numeric) Pain Score: 0 - No pain    Objective     Sx 9/18/24: 17 weeks, 0 days post op    R knee AROM:    2-121          Protocol:  REHABILITATION FOLLOWING UNILATERAL QUADRICEPS TENDON REPAIR    I. Immediate Postoperative Phase (Days 1-7)  Goals:  Restore full passive knee extension Diminish pain and joint swelling Restore patellar mobility  Initiate early controlled mobility  *Controlled forces on repair site    Brace: T-Scope brace locked at 0 degrees extension with compression wrap. Sleep and ambulate in brace.    ROM: No ROM    Exercises:  Ankle pumps   Quad Sets  Patellar mobilizations   Hip ABD/ADD  SLR (assisted)    Ice and elevation: 20 minutes of each hour and elevation    Sleep and ambulate in brace locked   Weightbearing: Locked brace with crutches, continue WBAT ROM: No ROM    Initiate gravity eliminate SLR (assisted)    Continue ice and elevation    II.  Maximum Protection Phase (Weeks 2-6)  Goals:  Control forces on healing   Gradually increase knee flexion   Restore full PROM  Restore patellar mobility   Retard muscle atrophy  Week 2:    Brace: Continue use of locked brace (4-6 weeks) Sleep in brace (4-6 weeks)    Weightbearing: Weightbearing as tolerated    ROM: PROM knee flexion only 0-30 degrees Full passive knee extension  Patellar mobilization    Exercises:  Stim to quads   Quad sets   Ankle pumps   Hip ABD/ADD  Gravity eliminated SLR flexion      Week 3:  Continue above mentioned.  Continue use of crutches and WB to 75-80% body weight     Week 4:  Continue all exercises listed above.     Weightbearing: Weightbearing as tolerated    ROM: PROM knee flexion only 0-60 degrees    Exercises:  Initiate weight shifting Initiate proprioception drills    Week  5-6:  Brace: Unlock brace for ambulation at week 6    Weightbearing: Discontinue use of crutches at 5 weeks post op    ROM: PROM 0-60 degrees    Exercises:  Initiate pool program  Active knee extension 90-30 degrees  Multi-angle isometrics knee extension (sub max) Mini- squats  Continue all exercises listed above  Ill. Moderate Protection Phase  "(Weeks 7-16)    Goals:    Control forces during ambulation and ADLs   Progress knee flexion ROM  Improve lower extremity muscular strength   Restore limb confidence and function    Weeks 7-10:  Brace:   Use postop brace unlocked for ambulation until 7-8 weeks (or until determined safe)  Discontinue sleeping in brace  Range of motion:     Week 7: 0-95/100°  Week 8: 0-100/105°  Week 10: 0-115°    Exercises:   Gradually increase muscular strength  Straight leg raises (flexion)   Hip abduction/adduction  Knee extension 90-0  1/2 squats   Leg press   Wall squats   Front lunges  Lateral lunges   Calf raises  Hamstring curls (restricted ROM)  Proprioception drills  Bicycle  Pool program  Weeks 12-16:  Range of motion: Week 12: 0-125°    Exercises:   Continue all exercises listed above  Initiate lateral step-ups   Initiate front step-downs   Initiate backward lunges   Walking program  No sports      IV. Light Activity Phase (Months 4-6)    Goals:   Enhancement of strength, endurance  Initiate functional activities  Improve tensile strength properties of tendon        Treatments:  Therapeutic exercises:   Upright bike L 2.0 x6' SH 4  SL hip abduction (brace on and locked) 2x10 NT  SLR (outside of brace) 2x10 NT  Weight shifting lateral (brace locked) 15x3\" ea NT  Standing 3 way hip x 20 B  NT  HR/TR x20ea NT  HS stretch at step 3x30\"   Calf stretch at step 3x30\"   Mini squats 2x10 NT  Heel slides with strap x10  Knee flexion stretch on step 15x5\" ea NT  Leg press 3 1/2 pl 3x10  Ball on wall squat 2x10 NT  Step ups fwd/lat 8\" x20 ea  Fwd step downs 6\" x20   Prone quad set 15x5\" NT  Prone hang 4# x3'  Fwd lunges x10 ea  Lat lunge x10 ea   LAQ 1# 2x10    Manual therapy:   Gentle passive stretch to the knee into extension  Contract relax to the hamstrings to promote knee extension     OP EDUCATION:     10/21/24:  Exercises  - Supine Quad Set  - 2 x daily - 7 x weekly - 2 sets - 10 reps - 5 sec hold  - Seated Knee Extension " Stretch with Chair  - 2 x daily - 7 x weekly - 1 sets - 1 reps - 3-5 min hold  - Supine Single Leg Ankle Pumps  - 2 x daily - 7 x weekly - 2 sets - 10 reps  11/7/24:  Exercises  - Seated Hamstring Stretch  - 2 x daily - 7 x weekly - 1 sets - 3 reps - 30 sec hold  - Long Sitting Calf Stretch with Strap  - 2 x daily - 7 x weekly - 1 sets - 3 reps - 30 sec hold  11/12/24:  Exercises  - Prone Knee Extension Hang  - 2 x daily - 7 x weekly - 1 sets - 1 reps - 5 min hold  Goals:  By discharge:  1. Patient will report and demonstrate independence with current HEP ONGOING  2. Patient will demonstrate AROM of the R knee 0-120 to improve their ability to ambulate, negotiate stairs, and squat without restriction PROGRESSING  3. Patient will demonstrate the ability to ascend/descend one flight of stairs reciprocally and without an increase in pain to improve function within the home and the community MET  4. Patient will demonstrate gross hip and knee strength of >/= 4+/5 to improve the ability to ambulate, squat, and lift without restrictions PROGRESSING  5. Patient will demonstrate an increase in Lower Extremity Functional Scale score by 8 points to 62/80 to meet established MCID (baseline 10/21/24 54/80) MET  6. Patient will demonstrate the ability to perform 15 bodyweight squats while maintaining proper hip and knee mechanics and without pain in the knee exceeding 2/10 PARTIALLY MET  7. Patient will report pain of 0/10 at rest, and no greater than 2/10 with any activity including standing, walking, stairs, and squatting MET  8. Patient will demonstrate right quadriceps and hamstring strength >/= 85% of the contralateral side measured with a handheld dynamometer to indicate improved functional strength and stability of the knee and indicate potential return to recreation activities  PARTIALLY MET  9. Patient will demonstrate the ability to ambulate >/= 150' outside of the brace, and without any major deviations in gait to  indicate improved mobility within the home and the community  PROGRESSING  10. Patient will demonstrate the ability to perform 15 forward and 15 backward lunges while maintaining proper hip and knee mechanics and without an increase in pain to indicate improved functional strength of the right lower extremity NOT ASSESSED

## 2025-01-23 DIAGNOSIS — Z00.6 CLINICAL TRIAL PARTICIPANT: Primary | ICD-10-CM

## 2025-01-23 DIAGNOSIS — Z00.6 RESEARCH STUDY PATIENT: ICD-10-CM

## 2025-01-27 ENCOUNTER — HOSPITAL ENCOUNTER (OUTPATIENT)
Dept: RESEARCH | Facility: HOSPITAL | Age: 75
Discharge: HOME | End: 2025-01-27
Payer: MEDICARE

## 2025-01-27 VITALS
HEIGHT: 73 IN | OXYGEN SATURATION: 99 % | RESPIRATION RATE: 20 BRPM | SYSTOLIC BLOOD PRESSURE: 130 MMHG | TEMPERATURE: 97.4 F | HEART RATE: 52 BPM | WEIGHT: 216.16 LBS | DIASTOLIC BLOOD PRESSURE: 78 MMHG | BODY MASS INDEX: 28.65 KG/M2

## 2025-01-27 DIAGNOSIS — Z00.6 CLINICAL TRIAL PARTICIPANT: ICD-10-CM

## 2025-01-27 PROCEDURE — 2560000001 HC RX 256 EXPERIMENTAL DRUGS: Performed by: PSYCHIATRY & NEUROLOGY

## 2025-01-27 PROCEDURE — 96365 THER/PROPH/DIAG IV INF INIT: CPT

## 2025-01-27 RX ADMIN — Medication 942 MG: at 10:44

## 2025-01-27 NOTE — RESEARCH NOTES
"DCRU RESEARCH CLINICAL VISIT NOTE  Study Name: AHEAD 3-45 [id number]  IRB#: [id number]  [facility]#: [id number]  Protocol Version Dated: V9-06 Oct 2023  PI: Kenney Shipley     Time point: A3 ALL OTHER INFUSION ONLY VISITS    Encounter Date: 01/27/2025  Encounter Time: 10:00 AM EST  Encounter Department: [place] [address] RESEARCH         Phone Marquise Roblero [phone]    Christin Boyce [phone]      Visit Provider: Loli Kolb, ALBINA    Subjective   Wiliam Keenan \"Jamar\" is a 74 y.o. male and is here for a Research clinical visit.    Allergies:   Allergies   Allergen Reactions    Bee Venom Protein (Honey Bee) Unknown    Poison Ivy Extract Unknown       Study Regimen and Dosing   A3-45 Trial-The study is a 216-week treatment, multicenter, double-blind, placebo-controlled, parallel-treatment arm study in subjects with preclinical AD and elevated amyloid (A45 Trial) and subjects with early preclinical AD and intermediate amyloid beta (A?i) (A3 Trial)   A-45 Trial:  [id number] placebo:  5 mg/kg IV Q2W through 8 weeks (titration), then 10 mg/kg IV Q2W through 96 weeks (induction), then 10 mg/kg IV Q4W through 216 weeks (maintenance).       Admission and Prior to Starting Study Activities     Obtain weight in kilograms - with shoes off & heavy items removed.  Verify with coordinator there is not a 10% change in weight from previous visits weight.   Insert one peripheral IV line for sample collection procedures (flush line with 5 - 10 mL normal saline following each blood draw).  Access mediport (if available) otherwise insert second peripheral line in opposite arm for Investigative drug administration (if peripheral line, flush line with 5 - 10 mL normal saline before & after infusion)Exceptions:          Criteria to Treat   DCRU RN reviewed and meets eligibility to proceed with treatment plan   Time team notified: 0930   DCRU RN notifies study team to review " "eligibility and approval before dosing procedures  Time team approves: 0940      Dietary Guidelines   Regular Diet        Objective   Vital Signs:   Vitals:    01/27/25 0937 01/27/25 1147 01/27/25 1218   BP: 136/79 131/78 130/78   Pulse: 63 55 52   Resp: 20 20 20   Temp: 36.4 °C (97.5 °F) 36.3 °C (97.3 °F) 36.3 °C (97.4 °F)   TempSrc: Oral Oral Oral   SpO2: 95% 97% 99%   Weight: 98.1 kg (216 lb 2.6 oz)     Height: 1.862 m (6' 1.31\")         ASSESSMENT and PLAN:  Problem List Items Addressed This Visit    None  Visit Diagnoses       Clinical trial participant        Relevant Medications    Study AHEAD [id number] or placebo 942 mg in sodium chloride 0.9% 250 mL IV (Completed)    Other Relevant Orders    Adult diet Regular            Medications as of the completion of today's visit:  Administrations This Visit       Study AHEAD [id number] or placebo 942 mg in sodium chloride 0.9% 250 mL IV       Admin Date  01/27/2025 Action  New Bag Dose  942 mg Rate  250 mL/hr Route  intravenous Documented By  Loli Kolb RN                    Orders placed during today's visit:  Orders Placed This Encounter   Procedures    Adult diet Regular     Standing Status:   Standing     Number of Occurrences:   1     Order Specific Question:   Diet type     Answer:   Regular       [id number] - AHEAD - A3 A45    Patient has no adverse events documented in the Research Adverse Events activity.      Study Specific Instructions and Documentation           Enter a snapshot of performable actions in the order performed:  Premedication  Pre-Dose Vitals- use same arm for BP for all visits  Study drug infusion  30 min post infusion observation   Post-Dose Vital Signs- same arm for all visits- EOI  Post-Dose Vital Signs- same arm for all visits     Weight-Based Dosing     Sponsor allows participant weight taken from previous VISIT to calculate dose unless there is a noted weight difference of 10% weight gain or loss      Reference weight (kg) provided by " "coordinator 94.2kg  Date measured 11/4/2024  Actual weight   Vitals:    01/27/25 0937   Weight: 98.1 kg (216 lb 2.6 oz)     Date Measured 01/27/2025     Safety Parameters and Special Instructions   [id number] is a monoclonal antibody.   Amyloid is a protein that builds up in the brains of people who have Alzheimer's disease (AD). This build-up of amyloid protein in the brain is called “amyloid plaque” and may lead to impairment in memory and thinking.  [id number] binds to amyloid in the brain and has been shown to reduce the amount of this abnormal protein.   Side effects:   Infusion reactions (Flushing, skin rash, fever, chills, general body aches, feeling shaky, swelling tissues, muscle constrictions, shortness of breath), abnormally low blood pressure,  headache, vasogenic edema (build-up of fluid in the brain most often temporary), cerebral hemorrhages, diarrhea, nausea, and cough.        PRE-DOSE Medications   Document medication administration in MAR activity.   As ordered  Administer within 30 min prior to dosing       PRE-DOSE Vital Signs   Prior to dosing document following Vitals.  Semi-supine x 3 minutes before obtaining.   BP same arm for all visits  -see coordinator note for which arm to use  HR     Temperature     Respirations     Time obtained: 0937 BP:  (location Left Arm)   Patient Vitals for the past 24 hrs:   BP Temp Temp src Pulse Resp SpO2 Height Weight   01/27/25 1218 130/78 36.3 °C (97.4 °F) Oral 52 20 99 % -- --   01/27/25 1147 131/78 36.3 °C (97.3 °F) Oral 55 20 97 % -- --   01/27/25 0937 136/79 36.4 °C (97.5 °F) Oral 63 20 95 % 1.862 m (6' 1.31\") 98.1 kg (216 lb 2.6 oz)      Infusion-Related Reactions   Review Safety Parameters on the medication Order. Follow [facility] Hypersensitivity Orders.       A-3 Dose Level    Medication Name   Titration:  [id number] placebo 5 mg/kg IV Q4W through 8 weeks (Visits 6-7)   Maintenance:   [id number] 10mg/kg or placebo IV every 4 weeks through 216 weeks (Visits 8-60) "     [id number] OR PLACEBO  Research Drug Administration   Document medication administration in MAR activity. Research specific instructions below.   Use 0.2 micron in-line filter.    Administer dose thru NON-DEHP Pathway (non-DEHP Tubing and Bag).  IDS (Investigational Drug Services) will prime filter & tubing with active drug. Infuse over approximately 60 minutes   BUD (Beyond Use Date): 24 hours at room temperature.  As IV bag empties, flush line using 50 mL NaCl 0.9% in PhaSeal® syringe to ensure entire dose is given.    Infusion Reactions:  Use [facility] orders. If the infusion reaction improves or resolves, infusion may be resumed if the investigator considers it safe to do so in his/her clinical judgment. If so, then resume the infusion at 50% of the prior rate once the infusion reaction has resolved; the infusion duration should not exceed 2 hours.    Observations 30 min after the end of the infusion.      Start and End of Infusion times   Start of Infusion:  1044 End of Infusion:  1147     POST-DOSE Vital Signs x 2   Document following Vitals at the time points listed below.    Semi-supine x 3 minutes before obtaining.   BP same arm for all visits  -see coordinator note for which arm to use  HR     Temperature     Respirations       EOI Time obtained: 1147 BP: (location Left arm)  See VS section   +30 min  EOI Time obtained: 1218 BP: (location Left arm)  See VS section     POST-DOSE Observation   Post Dose Observation End Time 1218     Discharge Instructions   Patient may be discharged to home after observations is complete and vital signs stable.   Remind patient to return for next study visit      Charges and Wrap-up   DCRU RNs: IMPORTANT CHANGE, Enter charge in [employer] for Ahead study drug infusion. Charge code is [id number] quantity is 1.   No charge for pre-meds      11-4-2024 nb  Loli Kolb, RN  01/27/25

## 2025-01-29 ENCOUNTER — TREATMENT (OUTPATIENT)
Dept: PHYSICAL THERAPY | Facility: CLINIC | Age: 75
End: 2025-01-29
Payer: MEDICARE

## 2025-01-29 DIAGNOSIS — S76.111D RUPTURE OF RIGHT QUADRICEPS TENDON, SUBSEQUENT ENCOUNTER: Primary | ICD-10-CM

## 2025-01-29 PROCEDURE — 97110 THERAPEUTIC EXERCISES: CPT | Mod: GP | Performed by: PHYSICAL THERAPIST

## 2025-01-29 PROCEDURE — 97140 MANUAL THERAPY 1/> REGIONS: CPT | Mod: GP | Performed by: PHYSICAL THERAPIST

## 2025-01-29 ASSESSMENT — PAIN SCALES - GENERAL: PAINLEVEL_OUTOF10: 0 - NO PAIN

## 2025-01-29 ASSESSMENT — PAIN - FUNCTIONAL ASSESSMENT: PAIN_FUNCTIONAL_ASSESSMENT: 0-10

## 2025-01-29 NOTE — PROGRESS NOTES
Physical Therapy    Physical Therapy Treatment    Patient Name: Wiliam Keenan  MRN: [id number]  Today's Date: 1/29/2025    Time Entry:   Time Calculation  Start Time: 0800  Stop Time: 0845  Time Calculation (min): 45 min     PT Therapeutic Procedures Time Entry  Manual Therapy Time Entry: 8  Therapeutic Exercise Time Entry: 35                 Insurance:  Medicare/[place]  BMN V  PA not req  Visit: 14  POC: 16    Assessment:  Treatment session focuses on therapeutic exercises and manual therapy to promote improving knee strength as well as achieving final 2-3 degrees of knee extension. Patient demonstrates small improvement in knee extension following manual therapy, but continues to struggle to achieve the last few degrees. PT stresses the importance of working on stretches as much as possible if improvements are to be seen. Patient tolerates strengthening well, but increased fatigue is noted as expected for given activities. Patient denies pain at end of session. He remains below his baseline function and would benefit from additional skilled PT.       Plan:  OP PT Plan  PT Plan: Skilled PT  Rehab Potential: Good  Plan of Care Agreement: Patient  Continue to progress strength and stability of the lower extremity as tolerated and per protocol      Current Problem  1. Rupture of right quadriceps tendon, subsequent encounter                            General     General  Reason for Referral: R knee pain s/p quadriceps tendon repair 9/18/24  Referred By: Genaro Sweeney PA-C  Past Medical History Relevant to Rehab: Pt denies significant PMH    Subjective    Patient denies pain at start of session. Reports that he has been working on stretching and strengthening the leg within tolerance.     Precautions  Precautions  STEADI Fall Risk Score (The score of 4 or more indicates an increased risk of falling): 3  Precautions Comment: Per protocol    Pain  Pain Assessment  Pain Assessment: 0-10  0-10 (Numeric) Pain Score: 0 - No  pain    Objective     Sx 9/18/24: 19 weeks, 0 days post op    R knee extension AROM (pre/post manual): +2/+1            Protocol:  REHABILITATION FOLLOWING UNILATERAL QUADRICEPS TENDON REPAIR    I. Immediate Postoperative Phase (Days 1-7)  Goals:  Restore full passive knee extension Diminish pain and joint swelling Restore patellar mobility  Initiate early controlled mobility  *Controlled forces on repair site    Brace: T-Scope brace locked at 0 degrees extension with compression wrap. Sleep and ambulate in brace.    ROM: No ROM    Exercises:  Ankle pumps   Quad Sets  Patellar mobilizations   Hip ABD/ADD  SLR (assisted)    Ice and elevation: 20 minutes of each hour and elevation    Sleep and ambulate in brace locked   Weightbearing: Locked brace with crutches, continue WBAT ROM: No ROM    Initiate gravity eliminate SLR (assisted)    Continue ice and elevation    II.  Maximum Protection Phase (Weeks 2-6)  Goals:  Control forces on healing   Gradually increase knee flexion   Restore full PROM  Restore patellar mobility   Retard muscle atrophy  Week 2:    Brace: Continue use of locked brace (4-6 weeks) Sleep in brace (4-6 weeks)    Weightbearing: Weightbearing as tolerated    ROM: PROM knee flexion only 0-30 degrees Full passive knee extension  Patellar mobilization    Exercises:  Stim to quads   Quad sets   Ankle pumps   Hip ABD/ADD  Gravity eliminated SLR flexion      Week 3:  Continue above mentioned.  Continue use of crutches and WB to 75-80% body weight     Week 4:  Continue all exercises listed above.     Weightbearing: Weightbearing as tolerated    ROM: PROM knee flexion only 0-60 degrees    Exercises:  Initiate weight shifting Initiate proprioception drills    Week  5-6:  Brace: Unlock brace for ambulation at week 6    Weightbearing: Discontinue use of crutches at 5 weeks post op    ROM: PROM 0-60 degrees    Exercises:  Initiate pool program  Active knee extension 90-30 degrees  Multi-angle isometrics knee  "extension (sub max) Mini- squats  Continue all exercises listed above  Ill. Moderate Protection Phase (Weeks 7-16)    Goals:    Control forces during ambulation and ADLs   Progress knee flexion ROM  Improve lower extremity muscular strength   Restore limb confidence and function    Weeks 7-10:  Brace:   Use postop brace unlocked for ambulation until 7-8 weeks (or until determined safe)  Discontinue sleeping in brace  Range of motion:     Week 7: 0-95/100°  Week 8: 0-100/105°  Week 10: 0-115°    Exercises:   Gradually increase muscular strength  Straight leg raises (flexion)   Hip abduction/adduction  Knee extension 90-0  1/2 squats   Leg press   Wall squats   Front lunges  Lateral lunges   Calf raises  Hamstring curls (restricted ROM)  Proprioception drills  Bicycle  Pool program  Weeks 12-16:  Range of motion: Week 12: 0-125°    Exercises:   Continue all exercises listed above  Initiate lateral step-ups   Initiate front step-downs   Initiate backward lunges   Walking program  No sports      IV. Light Activity Phase (Months 4-6)    Goals:   Enhancement of strength, endurance  Initiate functional activities  Improve tensile strength properties of tendon        Treatments:  Therapeutic exercises:   Upright bike L 2.0 x6' SH 4  SL hip abduction (brace on and locked) 2x10 NT  SLR (outside of brace) 2x10 NT  Standing 3 way hip x 20 B  NT  HR/TR x20ea NT  HS stretch at step 3x30\"   Calf stretch at step 3x30\"   Mini squats 2x10 NT  Heel slides with strap x10  Knee flexion stretch on step 15x5\" ea NT  Leg press 4 pl 3x10  Ball on wall squat 2x10 NT  Step ups fwd/lat 8\" x20 ea  Fwd step downs 6\" x20   Prone quad set 15x5\" NT  Prone hang 4# x3'  Fwd lunges x10 B  Lat lunge x10   LAQ 2# 3x10      Manual therapy:   Gentle passive stretch to the knee into extension  Contract relax to the hamstrings to promote knee extension   GrII/III PA mobs to knee joint to promote knee extension    OP EDUCATION:     10/21/24:  Exercises  - " Supine Quad Set  - 2 x daily - 7 x weekly - 2 sets - 10 reps - 5 sec hold  - Seated Knee Extension Stretch with Chair  - 2 x daily - 7 x weekly - 1 sets - 1 reps - 3-5 min hold  - Supine Single Leg Ankle Pumps  - 2 x daily - 7 x weekly - 2 sets - 10 reps  11/7/24:  Exercises  - Seated Hamstring Stretch  - 2 x daily - 7 x weekly - 1 sets - 3 reps - 30 sec hold  - Long Sitting Calf Stretch with Strap  - 2 x daily - 7 x weekly - 1 sets - 3 reps - 30 sec hold  11/12/24:  Exercises  - Prone Knee Extension Hang  - 2 x daily - 7 x weekly - 1 sets - 1 reps - 5 min hold  Goals:  By discharge:  1. Patient will report and demonstrate independence with current HEP ONGOING  2. Patient will demonstrate AROM of the R knee 0-120 to improve their ability to ambulate, negotiate stairs, and squat without restriction PROGRESSING  3. Patient will demonstrate the ability to ascend/descend one flight of stairs reciprocally and without an increase in pain to improve function within the home and the community MET  4. Patient will demonstrate gross hip and knee strength of >/= 4+/5 to improve the ability to ambulate, squat, and lift without restrictions PROGRESSING  5. Patient will demonstrate an increase in Lower Extremity Functional Scale score by 8 points to 62/80 to meet established MCID (baseline 10/21/24 54/80) MET  6. Patient will demonstrate the ability to perform 15 bodyweight squats while maintaining proper hip and knee mechanics and without pain in the knee exceeding 2/10 PARTIALLY MET  7. Patient will report pain of 0/10 at rest, and no greater than 2/10 with any activity including standing, walking, stairs, and squatting MET  8. Patient will demonstrate right quadriceps and hamstring strength >/= 85% of the contralateral side measured with a handheld dynamometer to indicate improved functional strength and stability of the knee and indicate potential return to recreation activities  PARTIALLY MET  9. Patient will demonstrate the  ability to ambulate >/= 150' outside of the brace, and without any major deviations in gait to indicate improved mobility within the home and the community  PROGRESSING  10. Patient will demonstrate the ability to perform 15 forward and 15 backward lunges while maintaining proper hip and knee mechanics and without an increase in pain to indicate improved functional strength of the right lower extremity NOT ASSESSED

## 2025-02-12 ENCOUNTER — TREATMENT (OUTPATIENT)
Dept: PHYSICAL THERAPY | Facility: CLINIC | Age: 75
End: 2025-02-12
Payer: MEDICARE

## 2025-02-12 DIAGNOSIS — S76.111D RUPTURE OF RIGHT QUADRICEPS TENDON, SUBSEQUENT ENCOUNTER: Primary | ICD-10-CM

## 2025-02-12 PROCEDURE — 97110 THERAPEUTIC EXERCISES: CPT | Mod: GP | Performed by: PHYSICAL THERAPIST

## 2025-02-12 PROCEDURE — 97140 MANUAL THERAPY 1/> REGIONS: CPT | Mod: GP | Performed by: PHYSICAL THERAPIST

## 2025-02-12 ASSESSMENT — PAIN SCALES - GENERAL: PAINLEVEL_OUTOF10: 0 - NO PAIN

## 2025-02-12 ASSESSMENT — PAIN - FUNCTIONAL ASSESSMENT: PAIN_FUNCTIONAL_ASSESSMENT: 0-10

## 2025-02-12 NOTE — PROGRESS NOTES
History of Present Illness   He is here today for follow-up of his right quadriceps tendon repair from 9/18/2024 as well as his left quadriceps tendon repair from 7/17/2023.  He is doing very well in his recovery.  He is finished outpatient physical therapy.  He is back to doing all of his activities and working out the gym.  He plans to resume pickleball and jogging when the weather gets warmer.     Review of Systems   GENERAL: Negative for malaise, significant weight loss, fever  MUSCULOSKELETAL: see HPI  NEURO:  Negative     Past Medical History:   Diagnosis Date    Left lower lobe pneumonia 08/14/2023    Other specified health status     No pertinent past medical history    Pneumonia of right upper lobe due to infectious organism 10/17/2023    Rupture of quadriceps tendon 08/14/2023    Sprain of left rotator cuff capsule 01/04/2016    Wheezing 10/01/2023        Medication Documentation Review Audit       Reviewed by Kayode Saavedra MA (Medical Assistant) on 09/16/24 at 0911      Medication Order Taking? Sig Documenting Provider Last Dose Status   Study AHEAD [id number] or placebo 967 mg in sodium chloride 0.9% 250 mL IV [id number]   Kenney Shipley MD  Active   Study AHEAD [id number] or placebo 969 mg in sodium chloride 0.9% 250 mL IV [id number]   Kenney Shipley MD  Active   Study AHEAD [id number] or placebo 969 mg in sodium chloride 0.9% 250 mL IV [id number]   Kenney Shipley MD  Active   Study AHEAD [id number] or placebo 978 mg in sodium chloride 0.9% 250 mL IV [id number]   Kenney Shipley MD  Active   Study AHEAD [id number] or placebo 990 mg in sodium chloride 0.9% 250 mL IV [id number]   Kenney Shipley MD  Active                     Physical Exam  Bilateral knees  Skin is intact without any evidence of erythema ecchymosis or effusion there are well-healed bilateral knee surgical scars  Range of motion is 0 to 130 degrees on the right 3 to 130 degrees on the left  The knees are stable in varus valgus and anterior  posterior stresses  He is distally neurovascularly intact  He is able to straight leg raise bilaterally     Imaging  MR brain wo IV contrast  Narrative: Interpreted By:  Patrick Gaines,   STUDY:  MR BRAIN WO IV CONTRAST;  10/7/2024 2:31 pm      INDICATION:  Signs/Symptoms:Research.      COMPARISON:  04/22/2024      ACCESSION NUMBER(S):  [id number]      ORDERING CLINICIAN:  DANI GONGORA      TECHNIQUE:  Axial diffusion, axial T2, axial T2 star, axial FLAIR, source bold  imaging, as well as volumetric T1 weighted MRI images of the brain  were obtained as per research protocol.      FINDINGS:  The diffusion weighted images fail to demonstrate evidence of  abnormal diffusion restriction to suggest acute infarction.      There is again evidence of similar mild-to-moderate brain parenchymal  volume loss when compared with the 04/22/2024.      Mild nonspecific white matter changes are again noted within the  cerebral hemispheres bilaterally which while nonspecific, given the  patient's age, likely represent sequelae of more remote small-vessel  ischemic change.      Small foci of encephalomalacia are again noted within the cerebellar  hemispheres bilaterally.      No abnormal blooming dark signal on the T2 star weighted  images to suggest intracranial hemorrhage is noted.      There is minimal mucosal thickening noted within the maxillary  sinuses and scattered ethmoid air cells.      There is opacification of a few mastoid air cells bilaterally.      Impression: MRI performed for research purposes with findings as described above.      MACRO:  None.      Signed by: Patrick Gaines 10/7/2024 2:46 PM  Dictation workstation:   [id number]       Assessment   Status post bilateral quadriceps tendon repairs     Plan  Okay to resume regular activities as tolerated  Follow-up as needed  All questions answered    Procedures

## 2025-02-12 NOTE — PROGRESS NOTES
Physical Therapy    Physical Therapy Treatment    Patient Name: Wiliam Keenan  MRN: [id number]  Today's Date: 2/12/2025    Time Entry:   Time Calculation  Start Time: 0801  Stop Time: 0845  Time Calculation (min): 44 min     PT Therapeutic Procedures Time Entry  Manual Therapy Time Entry: 8  Therapeutic Exercise Time Entry: 36                 Insurance:  Medicare/[place]  BMBREANNA XAVIER not req  Visit: 15  POC: 20    Assessment:  Patient has completed 15 therapy visits up to this point, and have met 6/10 established therapy goals. The patient has progressed well, and has shown improvements in pain intensity, strength, flexibility, and mobility. At this time, the patient remains below functional baseline with slight decrease in knee ROM, weakness of the lower extremity, and decreased quadriceps control. These deficits impair the patient's ability to perform higher level activities such as squatting, lunging, and recreation activities. Quadriceps strength compared to the contralateral side remains below the 85% goal measured with a dynamometer, and hamstring to quad ratio remains significantly higher than anticipated value, indicated continued weakness of the bilateral quadriceps. These factors indicate the patient would benefit from additional skilled PT. However, the patient reports that he would like to discharge to [facility] and work on strengthening on his own. PT encourages patient to discuss this with the surgeon at his follow up appointment later this week. If patient decides to discharge, measurements obtained today will act as discharge measurements.      Plan:  OP PT Plan  Treatment/Interventions: Aquatic therapy, Cryotherapy, Education/ Instruction, Electrical stimulation, Gait training, Hot pack, Manual therapy, Neuromuscular re-education, Self care/ home management, Taping techniques, Therapeutic activities, Therapeutic exercises, Vasopneumatic device  PT Plan: Skilled PT  PT Frequency: 1 time per week  Duration: 5  additional visits  Onset Date: 09/18/25  Certification Period Start Date: 02/12/25  Certification Period End Date: 05/13/25  Rehab Potential: Good  Plan of Care Agreement: Patient  Continue to progress strength and stability of the lower extremity as tolerated and per protocol      Current Problem  1. Rupture of right quadriceps tendon, subsequent encounter                    General     General  Reason for Referral: R knee pain s/p quadriceps tendon repair 9/18/24  Referred By: Genaro Sweeney PA-C  Past Medical History Relevant to Rehab: Pt denies significant PMH    Subjective    Pt reports that his knee is feeling good. Denies pain. Has been compliant with HEP, and believes he is regaining strength well. Continues to work on increasing knee extension.     Precautions  Precautions  STEADI Fall Risk Score (The score of 4 or more indicates an increased risk of falling): 3  Precautions Comment: Per protocol    Pain  Pain Assessment  Pain Assessment: 0-10  0-10 (Numeric) Pain Score: 0 - No pain    Objective     Sx 9/18/24: 21 weeks, 0 days post op    R knee extension AROM (pre/post manual): +2/+1    Knee AROM (*indicates pain): L from IE   Flexion R 126, L 126  Extension R 0 (+1 prior to manual therapy), L 0    Knee PROM   Extension R +3    LE strength (*indicates pain): L from IE   Knee flexion R 4+/5, L 4+/5  Knee extension R 4/5, L 4+/5  Hip flexion R 4+/5, L 4/5  Hip abd R 4/5, L 4/5    Muscle strength (lbs)  Quadriceps R 47.6, L 58.8 (80.9%)  Hamstring R 53.2, L 58.3 (91.3%)  R H:Q 111%      Gait: amb with slight lack of full knee extension at initial contact and during stance phase, good gait speed and stride/step length    Lunges: pt is able to complete 15 fwd and bwd lunges, but decreased eccentric control is noted in both     Squats: pt is able to complete 15 bodyweight squats, but increased lateral shift over the LLE--able to correct with cueing     Stairs: able to ascend and descend one flight of stairs  reciprocally and with good eccentric control (Assessed 12/17/25)    LEFS: 73/80        Protocol:  REHABILITATION FOLLOWING UNILATERAL QUADRICEPS TENDON REPAIR    I. Immediate Postoperative Phase (Days 1-7)  Goals:  Restore full passive knee extension Diminish pain and joint swelling Restore patellar mobility  Initiate early controlled mobility  *Controlled forces on repair site    Brace: T-Scope brace locked at 0 degrees extension with compression wrap. Sleep and ambulate in brace.    ROM: No ROM    Exercises:  Ankle pumps   Quad Sets  Patellar mobilizations   Hip ABD/ADD  SLR (assisted)    Ice and elevation: 20 minutes of each hour and elevation    Sleep and ambulate in brace locked   Weightbearing: Locked brace with crutches, continue WBAT ROM: No ROM    Initiate gravity eliminate SLR (assisted)    Continue ice and elevation    II.  Maximum Protection Phase (Weeks 2-6)  Goals:  Control forces on healing   Gradually increase knee flexion   Restore full PROM  Restore patellar mobility   Retard muscle atrophy  Week 2:    Brace: Continue use of locked brace (4-6 weeks) Sleep in brace (4-6 weeks)    Weightbearing: Weightbearing as tolerated    ROM: PROM knee flexion only 0-30 degrees Full passive knee extension  Patellar mobilization    Exercises:  Stim to quads   Quad sets   Ankle pumps   Hip ABD/ADD  Gravity eliminated SLR flexion      Week 3:  Continue above mentioned.  Continue use of crutches and WB to 75-80% body weight     Week 4:  Continue all exercises listed above.     Weightbearing: Weightbearing as tolerated    ROM: PROM knee flexion only 0-60 degrees    Exercises:  Initiate weight shifting Initiate proprioception drills    Week  5-6:  Brace: Unlock brace for ambulation at week 6    Weightbearing: Discontinue use of crutches at 5 weeks post op    ROM: PROM 0-60 degrees    Exercises:  Initiate pool program  Active knee extension 90-30 degrees  Multi-angle isometrics knee extension (sub max) Mini-  "squats  Continue all exercises listed above  Ill. Moderate Protection Phase (Weeks 7-16)    Goals:    Control forces during ambulation and ADLs   Progress knee flexion ROM  Improve lower extremity muscular strength   Restore limb confidence and function    Weeks 7-10:  Brace:   Use postop brace unlocked for ambulation until 7-8 weeks (or until determined safe)  Discontinue sleeping in brace  Range of motion:     Week 7: 0-95/100°  Week 8: 0-100/105°  Week 10: 0-115°    Exercises:   Gradually increase muscular strength  Straight leg raises (flexion)   Hip abduction/adduction  Knee extension 90-0  1/2 squats   Leg press   Wall squats   Front lunges  Lateral lunges   Calf raises  Hamstring curls (restricted ROM)  Proprioception drills  Bicycle  Pool program  Weeks 12-16:  Range of motion: Week 12: 0-125°    Exercises:   Continue all exercises listed above  Initiate lateral step-ups   Initiate front step-downs   Initiate backward lunges   Walking program  No sports      IV. Light Activity Phase (Months 4-6)    Goals:   Enhancement of strength, endurance  Initiate functional activities  Improve tensile strength properties of tendon        Treatments:  Therapeutic exercises:   Obj measures and goals review  Upright bike L 2.0 x6' SH 4  SL hip abduction (brace on and locked) 2x10 NT  SLR (outside of brace) 2x10 NT  Standing 3 way hip x 20 B  NT  HR/TR x20ea NT  HS stretch at step 3x30\"   Calf stretch at step 3x30\"   Mini squats 2x10 NT  Heel slides with strap x15  Knee flexion stretch on step 15x5\" ea NT  Leg press 4 pl 3x10   Ball on wall squat 2x10 NT  Step ups fwd/lat 8\" x20 ea NT  Fwd step downs 6\" x20 NT  Prone quad set 15x5\" NT  Prone hang 4# x3'  Fwd lunges x15   Bwd lunge x15  LAQ 2# 3x10 NT      Manual therapy:   Gentle passive stretch to the knee into extension  Contract relax to the hamstrings to promote knee extension   GrII/III PA mobs to knee joint to promote knee extension    OP EDUCATION:   "   10/21/24:  Exercises  - Supine Quad Set  - 2 x daily - 7 x weekly - 2 sets - 10 reps - 5 sec hold  - Seated Knee Extension Stretch with Chair  - 2 x daily - 7 x weekly - 1 sets - 1 reps - 3-5 min hold  - Supine Single Leg Ankle Pumps  - 2 x daily - 7 x weekly - 2 sets - 10 reps  11/7/24:  Exercises  - Seated Hamstring Stretch  - 2 x daily - 7 x weekly - 1 sets - 3 reps - 30 sec hold  - Long Sitting Calf Stretch with Strap  - 2 x daily - 7 x weekly - 1 sets - 3 reps - 30 sec hold  11/12/24:  Exercises  - Prone Knee Extension Hang  - 2 x daily - 7 x weekly - 1 sets - 1 reps - 5 min hold  Goals:  By discharge:  1. Patient will report and demonstrate independence with current HEP ONGOING  2. Patient will demonstrate AROM of the R knee 0-120 to improve their ability to ambulate, negotiate stairs, and squat without restriction MET  3. Patient will demonstrate the ability to ascend/descend one flight of stairs reciprocally and without an increase in pain to improve function within the home and the community MET  4. Patient will demonstrate gross hip and knee strength of >/= 4+/5 to improve the ability to ambulate, squat, and lift without restrictions PROGRESSING  5. Patient will demonstrate an increase in Lower Extremity Functional Scale score by 8 points to 62/80 to meet established MCID (baseline 10/21/24 54/80) MET  6. Patient will demonstrate the ability to perform 15 bodyweight squats while maintaining proper hip and knee mechanics and without pain in the knee exceeding 2/10 MET  7. Patient will report pain of 0/10 at rest, and no greater than 2/10 with any activity including standing, walking, stairs, and squatting MET  8. Patient will demonstrate right quadriceps and hamstring strength >/= 85% of the contralateral side measured with a handheld dynamometer to indicate improved functional strength and stability of the knee and indicate potential return to recreation activities  PARTIALLY MET  9. Patient will  demonstrate the ability to ambulate >/= 150' outside of the brace, and without any major deviations in gait to indicate improved mobility within the home and the community  MET  10. Patient will demonstrate the ability to perform 15 forward and 15 backward lunges while maintaining proper hip and knee mechanics and without an increase in pain to indicate improved functional strength of the right lower extremity NOT MET

## 2025-02-13 ENCOUNTER — APPOINTMENT (OUTPATIENT)
Dept: ORTHOPEDIC SURGERY | Facility: CLINIC | Age: 75
End: 2025-02-13
Payer: MEDICARE

## 2025-02-13 DIAGNOSIS — S76.111S QUADRICEPS TENDON RUPTURE, RIGHT, SEQUELA: Primary | ICD-10-CM

## 2025-02-13 DIAGNOSIS — S76.112S QUADRICEPS TENDON RUPTURE, LEFT, SEQUELA: ICD-10-CM

## 2025-02-13 PROCEDURE — 99213 OFFICE O/P EST LOW 20 MIN: CPT | Performed by: ORTHOPAEDIC SURGERY

## 2025-02-21 DIAGNOSIS — Z00.6 CLINICAL TRIAL PARTICIPANT: Primary | ICD-10-CM

## 2025-03-21 DIAGNOSIS — Z00.6 CLINICAL TRIAL PARTICIPANT: Primary | ICD-10-CM

## 2025-03-24 ENCOUNTER — HOSPITAL ENCOUNTER (OUTPATIENT)
Dept: RESEARCH | Facility: HOSPITAL | Age: 75
Discharge: HOME | End: 2025-03-24
Payer: MEDICARE

## 2025-03-24 VITALS
DIASTOLIC BLOOD PRESSURE: 67 MMHG | RESPIRATION RATE: 18 BRPM | TEMPERATURE: 97.7 F | HEART RATE: 61 BPM | BODY MASS INDEX: 28.5 KG/M2 | WEIGHT: 217.81 LBS | OXYGEN SATURATION: 95 % | SYSTOLIC BLOOD PRESSURE: 116 MMHG

## 2025-03-24 DIAGNOSIS — Z00.6 CLINICAL TRIAL PARTICIPANT: ICD-10-CM

## 2025-03-24 PROCEDURE — 96365 THER/PROPH/DIAG IV INF INIT: CPT

## 2025-03-24 PROCEDURE — 2560000001 HC RX 256 EXPERIMENTAL DRUGS: Performed by: PSYCHIATRY & NEUROLOGY

## 2025-03-24 RX ADMIN — Medication 981 MG: at 09:24

## 2025-03-24 NOTE — RESEARCH NOTES
DCRU RESEARCH CLINICAL VISIT NOTE  Study Name: AHEAD 3-45 [id number]  IRB#: [id number]  [facility]#: [id number]  Protocol Version Dated: V9-06 Oct 2023  PI: Kenney Shipley  Time point: ALL OTHER INFUSION ONLY VISITS    Encounter Date: 03/24/2025  Encounter Time:  8:30 AM EDT  Encounter Department: [place] [address] RESEARCH         Phone Marquise Roblero [phone]    Christin Boyce [phone]      Visit Provider: Presley Cueto RN    Francis Keenan is a 74 y.o. male and is here for a Research clinical visit.    Allergies:   Allergies   Allergen Reactions    Bee Venom Protein (Honey Bee) Unknown    Poison Ivy Extract Unknown       Study Regimen and Dosing   A3-45 Trial-The study is a 216-week treatment, multicenter, double-blind, placebo-controlled, parallel-treatment arm study in subjects with preclinical AD and elevated amyloid (A45 Trial) and subjects with early preclinical AD and intermediate amyloid beta (A?i) (A3 Trial)   A-45 Trial:  [id number] placebo:  5 mg/kg IV Q2W through 8 weeks (titration), then 10 mg/kg IV Q2W through 96 weeks (induction), then 10 mg/kg IV Q4W through 216 weeks (maintenance).       Admission and Prior to Starting Study Activities   Obtain weight in kilograms - with shoes off & heavy items removed.  Verify with coordinator there is not a 10% change in weight from previous visits weight.   Insert one peripheral IV line for sample collection procedures (flush line with 5 - 10 mL normal saline following each blood draw).  Access mediport (if available) otherwise insert second peripheral line in opposite arm for Investigative drug administration (if peripheral line, flush line with 5 - 10 mL normal saline before & after infusion)Exceptions:        Criteria to Treat   DCRU RN reviewed and meets eligibility to proceed with treatment plan   Time team notified: 0836 am  DCRU RN notifies study team to review eligibility and approval  before dosing procedures  Time team approves: 0836 am     Dietary Guidelines   Regular Diet      Objective   Vital Signs:   Vitals:    03/24/25 0840 03/24/25 1024 03/24/25 1054   BP: 130/71 117/73 116/67   Pulse: 62 60 61   Resp: 16 18 18   Temp: 36.5 °C (97.7 °F) 36.7 °C (98.1 °F) 36.5 °C (97.7 °F)   TempSrc: Oral Oral Oral   SpO2: 95% 97% 95%   Weight: 98.8 kg (217 lb 13 oz)         Medications as of the completion of today's visit:  Administrations This Visit       Study AHEAD [id number] or placebo 981 mg in sodium chloride 0.9% 250 mL IV       Admin Date  03/24/2025 Action  New Bag Dose  981 mg Rate  250 mL/hr Route  intravenous Documented By  Lydia Tatum RN                    Orders placed during today's visit:  Orders Placed This Encounter   Procedures    Adult diet Regular     Standing Status:   Standing     Number of Occurrences:   1     Order Specific Question:   Diet type     Answer:   Regular       7049-2816 - AHEAD - A3 A45    Patient has no adverse events documented in the Research Adverse Events activity.      Study Specific Instructions and Documentation   If available enter a snapshot of performable actions in the order performed:  Premedication  Pre-Dose Vitals- use same arm for BP for all visits  Study drug infusion  30 min post infusion observation   Post-Dose Vital Signs- same arm for all visits       Weight-Based Dosing     Sponsor allows participant weight taken from previous VISIT to calculate dose unless there is a noted weight difference of 10% weight gain or loss      Reference weight (kg) provided by coordinator 94.2 kg  Date measured 11/04/2024  Actual weight   Vitals:    03/24/25 0840   Weight: 98.8 kg (217 lb 13 oz)     Date Measured 03/24/2025     Safety Parameters and Special Instructions   [id number] is a monoclonal antibody.   Amyloid is a protein that builds up in the brains of people who have Alzheimer's disease (AD). This build-up of amyloid protein in the brain is called “amyloid  plaque” and may lead to impairment in memory and thinking.  [id number] binds to amyloid in the brain and has been shown to reduce the amount of this abnormal protein.   Side effects:   Infusion reactions (Flushing, skin rash, fever, chills, general body aches, feeling shaky, swelling tissues, muscle constrictions, shortness of breath), abnormally low blood pressure,  headache, vasogenic edema (build-up of fluid in the brain most often temporary), cerebral hemorrhages, diarrhea, nausea, and cough.        PRE-DOSE Medications   Document medication administration in MAR activity.   As ordered  Administer within 30 min prior to dosing       PRE-DOSE Vital Signs   Prior to dosing document following Vitals.  Semi-supine x 3 minutes before obtaining.   BP same arm for all visits  -see coordinator note for which arm to use  HR     Temperature     Respirations       Time Obtained: 0840 BP Arm: left arm     Infusion-Related Reactions   Review Safety Parameters on the medication Order. Follow  Hypersensitivity Orders.       A-45 Dose Level    Medication Name   Titration: [id number] 5mg/kg or placebo IV every 2 weeks thru 8 weeks (Visits 6-9) then   Induction: [id number] 10mg/kg or placebo IV every 2 weeks (Visits 10-53) thru 96 weeks then   Maintenance: [id number] 10mg/kg or placebo IV every 4 weeks (Visits 54-84) thru 216 weeks     BAN2401-Placebo Research Drug Administration   Document medication administration in MAR activity. Research specific instructions below.   Use 0.2 micron in-line filter.    Administer dose thru NON-DEHP Pathway (non-DEHP Tubing and Bag).  IDS (Investigational Drug Services) will prime filter & tubing with active drug. Infuse over approximately 60 minutes   BUD (Beyond Use Date): 24 hours at room temperature.  As IV bag empties, flush line using 50 mL NaCl 0.9% in PhaSeal® syringe to ensure entire dose is given.    Infusion Reactions:  Use  orders. If the infusion reaction improves or resolves, infusion may  be resumed if the investigator considers it safe to do so in his/her clinical judgment. If so, then resume the infusion at 50% of the prior rate once the infusion reaction has resolved; the infusion duration should not exceed 2 hours.    Observation time  30 min after the end of the infusion      BAN or placebo administration   Begin Infusion 0924 RN KB   End Infusion 1024 RN KB     POST-DOSE Vital Signs x 2   Document following Vitals at the time points listed below.    Semi-supine x 3 minutes before obtaining.   BP same arm for all visits  -see coordinator note for which arm to use  HR     Temperature     Respirations       EOI   Time Obtained: 1024        BP arm left   + 30 MIN EOI    Time Obtained: 1054        BP arm left     30 min POST-DOSE Observation   Post Dose Observation End Time 1054     Discharge Instructions   Patient may be discharged to home after observations is complete.   Remind patient to return for next study visit      Charges and Wrap-up   DCRU RNs: IMPORTANT CHANGE, Enter charge in [employer] for Ahead study drug infusion. Charge code is [id number] quantity is 1.   No charge for pre-meds      11-4-2024 nina Tatum RN  03/24/25

## 2025-03-24 NOTE — ADDENDUM NOTE
Encounter addended by: Lydia Tatum RN on: 3/24/2025 3:25 PM   Actions taken: Flowsheet accepted, Clinical Note Signed

## 2025-04-04 ENCOUNTER — APPOINTMENT (OUTPATIENT)
Dept: RADIOLOGY | Facility: HOSPITAL | Age: 75
End: 2025-04-04
Payer: MEDICARE

## 2025-04-07 ENCOUNTER — HOSPITAL ENCOUNTER (OUTPATIENT)
Dept: RADIOLOGY | Facility: HOSPITAL | Age: 75
Discharge: HOME | End: 2025-04-07
Payer: MEDICARE

## 2025-04-07 DIAGNOSIS — Z00.6 RESEARCH STUDY PATIENT: ICD-10-CM

## 2025-04-07 PROCEDURE — 70551 MRI BRAIN STEM W/O DYE: CPT | Performed by: RADIOLOGY

## 2025-04-07 PROCEDURE — 70551 MRI BRAIN STEM W/O DYE: CPT

## 2025-04-16 DIAGNOSIS — Z00.6 CLINICAL TRIAL PARTICIPANT: Primary | ICD-10-CM

## 2025-04-16 RX ORDER — FAMOTIDINE 10 MG/ML
20 INJECTION, SOLUTION INTRAVENOUS ONCE AS NEEDED
Status: CANCELLED | OUTPATIENT
Start: 2025-04-16

## 2025-04-16 RX ORDER — ALBUTEROL SULFATE 0.83 MG/ML
3 SOLUTION RESPIRATORY (INHALATION) ONCE AS NEEDED
Status: CANCELLED | OUTPATIENT
Start: 2025-04-16

## 2025-04-16 RX ORDER — EPINEPHRINE 1 MG/ML
0.3 INJECTION, SOLUTION, CONCENTRATE INTRAVENOUS AS NEEDED
Status: CANCELLED | OUTPATIENT
Start: 2025-04-16

## 2025-04-16 RX ORDER — DIPHENHYDRAMINE HYDROCHLORIDE 50 MG/ML
50 INJECTION, SOLUTION INTRAMUSCULAR; INTRAVENOUS ONCE AS NEEDED
Status: CANCELLED | OUTPATIENT
Start: 2025-04-16

## 2025-04-18 DIAGNOSIS — Z00.6 CLINICAL TRIAL PARTICIPANT: Primary | ICD-10-CM

## 2025-04-21 ENCOUNTER — HOSPITAL ENCOUNTER (OUTPATIENT)
Dept: RESEARCH | Facility: HOSPITAL | Age: 75
Discharge: HOME | End: 2025-04-21
Payer: MEDICARE

## 2025-04-21 VITALS
OXYGEN SATURATION: 95 % | RESPIRATION RATE: 18 BRPM | HEART RATE: 54 BPM | BODY MASS INDEX: 27.85 KG/M2 | DIASTOLIC BLOOD PRESSURE: 78 MMHG | WEIGHT: 216.93 LBS | TEMPERATURE: 98.1 F | SYSTOLIC BLOOD PRESSURE: 143 MMHG

## 2025-04-21 DIAGNOSIS — T78.40XA CURRENT HYPERSENSITIVITY REACTION, INITIAL ENCOUNTER: ICD-10-CM

## 2025-04-21 DIAGNOSIS — Z00.6 CLINICAL TRIAL PARTICIPANT: ICD-10-CM

## 2025-04-21 LAB
HOLD SPECIMEN: NORMAL

## 2025-04-21 PROCEDURE — 99213 OFFICE O/P EST LOW 20 MIN: CPT | Performed by: PSYCHIATRY & NEUROLOGY

## 2025-04-21 PROCEDURE — 96365 THER/PROPH/DIAG IV INF INIT: CPT

## 2025-04-21 PROCEDURE — 2560000001 HC RX 256 EXPERIMENTAL DRUGS: Performed by: PSYCHIATRY & NEUROLOGY

## 2025-04-21 RX ORDER — FAMOTIDINE 10 MG/ML
20 INJECTION INTRAVENOUS ONCE AS NEEDED
Status: DISCONTINUED | OUTPATIENT
Start: 2025-04-21 | End: 2025-04-22 | Stop reason: HOSPADM

## 2025-04-21 RX ORDER — DIPHENHYDRAMINE HYDROCHLORIDE 50 MG/ML
50 INJECTION, SOLUTION INTRAMUSCULAR; INTRAVENOUS ONCE AS NEEDED
Status: DISCONTINUED | OUTPATIENT
Start: 2025-04-21 | End: 2025-04-22 | Stop reason: HOSPADM

## 2025-04-21 RX ORDER — ALBUTEROL SULFATE 0.83 MG/ML
3 SOLUTION RESPIRATORY (INHALATION) ONCE AS NEEDED
Status: DISCONTINUED | OUTPATIENT
Start: 2025-04-21 | End: 2025-04-22 | Stop reason: HOSPADM

## 2025-04-21 RX ORDER — EPINEPHRINE 0.3 MG/.3ML
0.3 INJECTION SUBCUTANEOUS AS NEEDED
Status: DISCONTINUED | OUTPATIENT
Start: 2025-04-21 | End: 2025-04-22 | Stop reason: HOSPADM

## 2025-04-21 RX ADMIN — Medication 980 MG: at 11:30

## 2025-04-21 NOTE — ADDENDUM NOTE
Encounter addended by: Kenney Shipley MD on: 4/21/2025 5:35 PM   Actions taken: Level of Service modified, Clinical Note Signed

## 2025-04-21 NOTE — RESEARCH NOTES
"DCRU RESEARCH CLINICAL VISIT NOTE  Study Name: AHEAD 3-45 [id number]  IRB#: [id number]  [id number]  Protocol Version Dated: V9-06 Oct 2023  PI: Kenney Shipley    Time point: A45 Visit 66 Week 144, A45 Visit 78 Week 192    Encounter Date: 04/21/2025  Encounter Time:  9:00 AM EDT  Encounter Department: [facility] RESEARCH         Phone Marquise Roblero [phone]    Christin Boyce [phone]      Visit Provider: [id number], [facility] ROOM 17 [facility]   Wiliam Keenan \"Jamar\" is a 74 y.o. male and is here for a Research clinical visit.    Allergies: Allergies[1]    Study Regimen and Dosing   A3-45 Trial-The study is a 216-week treatment, multicenter, double-blind, placebo-controlled, parallel-treatment arm study in subjects with preclinical AD and elevated amyloid (A45 Trial) and subjects with early preclinical AD and intermediate amyloid beta (A?i) (A3 Trial)   A-45 Trial:  [id number] placebo:  5 mg/kg IV Q2W through 8 weeks (titration), then 10 mg/kg IV Q2W through 96 weeks (induction), then 10 mg/kg IV Q4W through 216 weeks (maintenance).       Admission and Prior to Starting Study Activities   Obtain weight in kilograms - with shoes off & heavy items removed.  Verify with coordinator there is not a 10% change in weight from previous visits weight.   Confirm Physical & Neurological Exam completed by PI.   Study Questionnaires - Coordinator will obtain prior to dosing.  Insert one peripheral IV line for sample collection procedures (flush line with 5 - 10 mL normal saline following each blood draw).  Access mediport (if available) otherwise insert second peripheral line in opposite arm for Investigative drug administration (if peripheral line, flush line with 5 - 10 mL normal saline before & after infusion)Exceptions:        Criteria to Treat   DCRU RN reviewed and meets eligibility to proceed with treatment plan   Time team notified: 0900 am  DCRU " RN notifies study team to review eligibility and approval before dosing procedures  Time team approves: 0900 am     Dietary Guidelines   Regular Diet      Objective   Vital Signs:   There were no vitals filed for this visit.    Medications as of the completion of today's visit:      Orders placed during today's visit:  No orders of the defined types were placed in this encounter.      1429-0960 - AHEAD - A3 A45    Patient has no adverse events documented in the Research Adverse Events activity.      Study Specific Instructions and Documentation   Pre-Dose urine collection and POCT pregnancy test  Premedication  Pre-Dose ECG- MD must read and sign prior to dosing  Pre-Dose Vitals- use same arm for BP for all visits  Pre-Dose Research Labs  Study drug infusion  30 min post infusion observation   Post-Dose Vital Signs- same arm for all visits       Weight-Based Dosing     Sponsor allows participant weight taken from previous VISIT to calculate dose unless there is a noted weight difference of 10% weight gain or loss      Reference weight (kg) provided by coordinator 94.2 kg  Date measured 11/04/2024  Actual weight There were no vitals filed for this visit.  Date Measured 04/21/2025     Safety Parameters and Special Instructions   [id number] is a monoclonal antibody.   Amyloid is a protein that builds up in the brains of people who have Alzheimer's disease (AD). This build-up of amyloid protein in the brain is called “amyloid plaque” and may lead to impairment in memory and thinking.  [id number] binds to amyloid in the brain and has been shown to reduce the amount of this abnormal protein.   Side effects:   Infusion reactions (Flushing, skin rash, fever, chills, general body aches, feeling shaky, swelling tissues, muscle constrictions, shortness of breath), abnormally low blood pressure,  headache, vasogenic edema (build-up of fluid in the brain most often temporary), cerebral hemorrhages, diarrhea, nausea, and cough.         PRE-DOSE Urine Collection     Test Volume Tube Handling Collection Time   Research Urinalysis 25 mL Collection Cup Ambient- to DCRU Lab 1117   POCT Pregnancy Test If positive, no testing, call coordinator   POCT Pregnancy Results Control N/A Result N/A Lot# N/A Exp. Date N/A     PRE-DOSE Medications   Document medication administration in MAR activity.   As ordered  Administer within 30 min prior to dosing       PRE-DOSE ECG-12 lead   Before Research Labs  Prior to dosing  Single  Time supine at least 5 min prior  Study Specific ECG Machine  MD/Provider must review & sign before dosing   QTcF calculation using Fridericia's formula  If QTcF is greater than 450ms, obtain 2 additional ECG's with QtcF Calculation and notify covering MD and    ECG #1 QTcF ECG #2 QTcF ECG #3 QTcF   408          Time Point   Time Completed   Pre Dose ECG 10:11:53   ECG 2- if QTcF is greater than 450ms    ECG 3- if QTcF is greater than 450ms      PRE-DOSE Vital Signs   Prior to dosing document following Vitals.  Semi-supine x 3 minutes before obtaining.   BP same arm for all visits  -see coordinator note for which arm to use  HR     Temperature     Respirations       Time Obtained: 1119 BP Arm: Left     PRE-DOSE Research Correlatives:     Refer to orders placed by   Access Type: 22 G PIV Location: R FA   Time point Specimen Test Volume Tube Handling Draw Time    Pre Dose             Chemistry 4 mL Red Top Serum Ambient, Invert 5X   1020    Hematology 2 mL EDTA Ambient, Invert 10X     PD Biomarkers 2 x 10 mL EDTA Ambient, Invert 10X         Infusion-Related Reactions   Review Safety Parameters on the medication Order. Follow [facility] Hypersensitivity Orders.       A-45 Dose Level    Medication Name   Titration: [id number] 5mg/kg or placebo IV every 2 weeks thru 8 weeks (Visits 6-9) then   Induction: [id number] 10mg/kg or placebo IV every 2 weeks (Visits 10-53) thru 96 weeks then   Maintenance: [id number] 10mg/kg or  placebo IV every 4 weeks (Visits 54-84) thru 216 weeks     Research Drug Administration   Document medication administration in MAR activity. Research specific instructions below.  Administer [id number]  IV in 250 mL NaCl 0.9%(Final Volume).     Use 0.2 micron in-line filter.    Administer dose thru NON-DEHP Pathway (non-DEHP Tubing and Bag).  IDS (Investigational Drug Services) will prime filter & tubing with active drug. Infuse over approximately 60 minutes   BUD (Beyond Use Date): 24 hours at room temperature.  As IV bag empties, flush line using 50 mL NaCl 0.9% in PhaSeal® syringe to ensure entire dose is given.    Infusion Reactions:  Use [facility] orders. If the infusion reaction improves or resolves, infusion may be resumed if the investigator considers it safe to do so in his/her clinical judgment. If so, then resume the infusion at 50% of the prior rate once the infusion reaction has resolved; the infusion duration should not exceed 2 hours.    Observation 30 min after the end of the infusion      BAN or placebo administration   Begin Infusion 1130 RN JG   End [id number] RN JG     POST-DOSE Vital Signs x 3   Document following Vitals at the time points listed below.    Semi-supine x 3 minutes before obtaining.   BP same arm for all visits  -see coordinator note for which arm to use  HR     Temperature     Respirations       EOI  Time Obtained: 1232     BP  Arm: Left   + 30 min  EOI Time Obtained: 1300    BP  Arm: Left     = 30 min POST-DOSE Observation   Post Dose Observation End Time 1300     Discharge Instructions   Patient may be discharged to home after observations is complete   Remind patient to return for next study visit      Charges and Wrap-up   DCRU RNs: IMPORTANT CHANGE, Enter charge in [employer] for Ahead study drug infusion. Charge code is [id number] quantity is 1.   No charge for pre-meds      11-4-2024 nina Alex, ALBINA  04/21/25          [1]   Allergies  Allergen Reactions    Bee Venom Protein  (Honey Bee) Unknown    Poison Ivy Extract Unknown

## 2025-05-13 DIAGNOSIS — Z00.6 CLINICAL TRIAL PARTICIPANT: Primary | ICD-10-CM

## 2025-05-21 DIAGNOSIS — Z00.6 CLINICAL TRIAL PARTICIPANT: Primary | ICD-10-CM

## 2025-06-10 DIAGNOSIS — Z00.6 CLINICAL TRIAL PARTICIPANT: Primary | ICD-10-CM

## 2025-06-25 ENCOUNTER — DOCUMENTATION (OUTPATIENT)
Dept: PHYSICAL THERAPY | Facility: CLINIC | Age: 75
End: 2025-06-25
Payer: MEDICARE

## 2025-06-25 NOTE — PROGRESS NOTES
"Physical Therapy    Discharge Summary    Name: Wiliam Keenan \"Jamar\"  MRN: [id number]  : 1950  Date: 25    Discharge Summary: PT    Discharge Information: Date of discharge 25, Date of last visit 25, Date of evaluation 10/21/25, Number of attended visits 15, Referred by Genaro Sweeney PA-C, and Referred for R knee pain s/p quadriceps tendon repair 24    Therapy Summary: PT focused on improving flexibility of the knee while increasing strength and stability of the quadriceps and lower extremity musculature following surgery    Discharge Status: D/t the nature of the absent discharge, patient's current status is unknown at this time     Rehab Discharge Reason: At the time of the patient's last visit, the patient requests that he would like to discharge to [facility] due to good perceived progress. Patient was instructed to discuss this with his surgeon at their next follow up. As it has now been greater than 30 days, PT recommends discharge of case, and objective measurements obtained 25 will act as discharge measurements.   "

## 2025-07-09 DIAGNOSIS — Z00.6 CLINICAL TRIAL PARTICIPANT: Primary | ICD-10-CM

## 2025-07-14 ENCOUNTER — HOSPITAL ENCOUNTER (OUTPATIENT)
Dept: RESEARCH | Facility: HOSPITAL | Age: 75
Discharge: HOME | End: 2025-07-14
Payer: MEDICARE

## 2025-07-14 VITALS
HEART RATE: 52 BPM | TEMPERATURE: 98.1 F | OXYGEN SATURATION: 98 % | DIASTOLIC BLOOD PRESSURE: 73 MMHG | RESPIRATION RATE: 18 BRPM | SYSTOLIC BLOOD PRESSURE: 146 MMHG

## 2025-07-14 DIAGNOSIS — Z00.6 CLINICAL TRIAL PARTICIPANT: ICD-10-CM

## 2025-07-14 PROCEDURE — 96365 THER/PROPH/DIAG IV INF INIT: CPT

## 2025-07-14 PROCEDURE — 2560000001 HC RX 256 EXPERIMENTAL DRUGS: Performed by: PSYCHIATRY & NEUROLOGY

## 2025-07-14 PROCEDURE — 36415 COLL VENOUS BLD VENIPUNCTURE: CPT

## 2025-07-14 RX ADMIN — Medication 984 MG: at 11:35

## 2025-07-14 NOTE — RESEARCH NOTES
"DCRU RESEARCH CLINICAL VISIT NOTE  Study Name: AHEAD 3-45 [id number]  IRB#: [id number]  [id number]  Protocol Version Dated: V11_30 Jan 2025  PI: Kenney Shipley  Time point: A45 Visit 57 Week 108, A45 Visit 63 Week 132, A45 Visit 69 Week 156, A45 Visit 75 Week 180, A45 Visit 81 Week 204, ALL OTHER INFUSION ONLY VISITS    Encounter Date: 07/14/2025  Encounter Time:  9:30 AM EDT  Encounter Department: [place] [address] RESEARCH         Phone Marquise Roblero [phone]    Christin Boyce [phone]      Visit Provider: [address] [place] [address] ROOM 1 [facility]   Wiliam Keenan \"Bill\" is a 75 y.o. male and is here for a Research clinical visit.    Allergies: Allergies[1]    Study Regimen and Dosing   A3-45 Trial-The study is a 216-week treatment, multicenter, double-blind, placebo-controlled, parallel-treatment arm study in subjects with preclinical AD and elevated amyloid (A45 Trial) and subjects with early preclinical AD and intermediate amyloid beta (A?i) (A3 Trial)   A-45 Trial:  [id number] placebo:  5 mg/kg IV Q2W through 8 weeks (titration), then 10 mg/kg IV Q2W through 96 weeks (induction), then 10 mg/kg IV Q4W through 216 weeks (maintenance).       Admission and Prior to Starting Study Activities   Obtain weight in kilograms - with shoes off & heavy items removed.  Verify with coordinator there is not a 10% change in weight from previous visits weight.   Confirm Physical & Neurological Exam completed by PI.   Study Questionnaires - Coordinator will obtain prior to dosing.  Insert one peripheral IV line for sample collection procedures (flush line with 5 - 10 mL normal saline following each blood draw).  Access mediport (if available) otherwise insert second peripheral line in opposite arm for Investigative drug administration (if peripheral line, flush line with 5 - 10 mL normal saline before & after infusion)Exceptions:        Criteria to Treat   DCRU RN " reviewed and meets eligibility to proceed with treatment plan   Time team notified: 10 am  DCRU RN notifies study team to review eligibility and approval before dosing procedures  Time team approves: 1030 am     Dietary Guidelines   Regular Diet        Objective   Vital Signs:   There were no vitals filed for this visit.    Medications as of the completion of today's visit:      Orders placed during today's visit:  No orders of the defined types were placed in this encounter.      1331-9813 - AHEAD - A3 A45    Patient has no adverse events documented in the Research Adverse Events activity.      Study Specific Instructions and Documentation   Premedication  Pre-Dose Vitals- use same arm for BP for all visits  Study drug infusion  30 min post infusion observation   Post-Dose Vital Signs- same arm for all visit       Weight-Based Dosing     Sponsor allows participant weight taken from previous VISIT to calculate dose unless there is a noted weight difference of 10% weight gain or loss      Reference weight (kg) provided by coordinator   Date measured   Actual weight There were no vitals filed for this visit.  Date Measured 07/14/2025       Safety Parameters and Special Instructions   [id number] is a monoclonal antibody.   Amyloid is a protein that builds up in the brains of people who have Alzheimer's disease (AD). This build-up of amyloid protein in the brain is called “amyloid plaque” and may lead to impairment in memory and thinking.  [id number] binds to amyloid in the brain and has been shown to reduce the amount of this abnormal protein.   Side effects:   Infusion reactions (Flushing, skin rash, fever, chills, general body aches, feeling shaky, swelling tissues, muscle constrictions, shortness of breath), abnormally low blood pressure,  headache, vasogenic edema (build-up of fluid in the brain most often temporary), cerebral hemorrhages, diarrhea, nausea, and cough.        PRE-DOSE Medications   Document medication  administration in MAR activity.   As ordered  Administer within 30 min prior to dosing       PRE-DOSE Vital Signs   Prior to dosing document following Vitals.  Semi-supine x 3 minutes before obtaining.   BP same arm for all visits  -see coordinator note for which arm to use  HR     Temperature     Respirations       Time Obtained: 1130 BP Arm: left     Infusion-Related Reactions   Review Safety Parameters on the medication Order. Follow  Hypersensitivity Orders.       A-45 Dose Level    Medication Name   Titration: [id number] 5mg/kg or placebo IV every 2 weeks thru 8 weeks (Visits 6-9) then   Induction: [id number] 10mg/kg or placebo IV every 2 weeks (Visits 10-53) thru 96 weeks then   Maintenance: [id number] 10mg/kg or placebo IV every 4 weeks (Visits 54-84) thru 216 weeks     Research Drug Administration   Document medication administration in MAR activity. Research specific instructions below.  Administer [id number]  or placebo  IV in 250 mL NaCl 0.9%(Final Volume).     Use 0.2 micron in-line filter.    Administer dose thru NON-DEHP Pathway (non-DEHP Tubing and Bag).  IDS (Investigational Drug Services) will prime filter & tubing with active drug. Infuse over approximately 60 minutes   BUD (Beyond Use Date): 24 hours at room temperature.  As IV bag empties, flush line using 50 mL NaCl 0.9% in PhaSeal® syringe to ensure entire dose is given.    Infusion Reactions:  Use  orders. If the infusion reaction improves or resolves, infusion may be resumed if the investigator considers it safe to do so in his/her clinical judgment. If so, then resume the infusion at 50% of the prior rate once the infusion reaction has resolved; the infusion duration should not exceed 2 hours.    Observation  30 min after the end of the infusion.     BAN or placebo administration   Begin Infusion 1135 RN sv   End Infusion 1235 RN sv     POST-DOSE Vital Signs x 2   Document following Vitals at the time points listed below.    Semi-supine x 3 minutes  before obtaining.   BP same arm for all visits  -see coordinator note for which arm to use  HR     Temperature     Respirations       EOI  Time Obtained: 1235     BP Arm: left   +30 min EOI Time Obtained: 1305     BP Arm: left     30 min POST-DOSE Observation   Post Dose Observation End Time 1305     Discharge Instructions   Patient may be discharged to home after observations is complete and vital signs stable.   Remind patient to return for next study visit      Charges and Wrap-up   DCRU RNs: IMPORTANT CHANGE, Enter charge in [employer] for Ahead study drug infusion. Charge code is [id number] quantity is 1.   No charge for pre-meds    04-23-25 nina Santos, ALBINA  07/14/25          [1]   Allergies  Allergen Reactions    Bee Venom Protein (Honey Bee) Unknown    Poison Ivy Extract Unknown

## 2025-08-06 DIAGNOSIS — Z00.6 CLINICAL TRIAL PARTICIPANT: Primary | ICD-10-CM

## 2025-08-12 ENCOUNTER — APPOINTMENT (OUTPATIENT)
Dept: RADIOLOGY | Facility: HOSPITAL | Age: 75
End: 2025-08-12
Payer: MEDICARE

## 2025-08-12 ENCOUNTER — APPOINTMENT (OUTPATIENT)
Dept: CARDIOLOGY | Facility: HOSPITAL | Age: 75
End: 2025-08-12
Payer: MEDICARE

## 2025-08-12 ENCOUNTER — HOSPITAL ENCOUNTER (INPATIENT)
Facility: HOSPITAL | Age: 75
LOS: 1 days | Discharge: HOME | End: 2025-08-13
Attending: EMERGENCY MEDICINE | Admitting: INTERNAL MEDICINE
Payer: MEDICARE

## 2025-08-12 ENCOUNTER — APPOINTMENT (OUTPATIENT)
Dept: NEUROLOGY | Facility: HOSPITAL | Age: 75
End: 2025-08-12
Payer: MEDICARE

## 2025-08-12 DIAGNOSIS — R41.0 ACUTE CONFUSION: Primary | ICD-10-CM

## 2025-08-12 DIAGNOSIS — R00.1 BRADYCARDIA: ICD-10-CM

## 2025-08-12 DIAGNOSIS — I63.9 ACUTE CVA (CEREBROVASCULAR ACCIDENT) (MULTI): ICD-10-CM

## 2025-08-12 DIAGNOSIS — I63.431: ICD-10-CM

## 2025-08-12 DIAGNOSIS — R29.90 STROKE-LIKE SYMPTOM: ICD-10-CM

## 2025-08-12 LAB
ALBUMIN SERPL BCP-MCNC: 3.6 G/DL (ref 3.4–5)
ALBUMIN SERPL BCP-MCNC: 4.1 G/DL (ref 3.4–5)
ALP SERPL-CCNC: 69 U/L (ref 33–136)
ALP SERPL-CCNC: 80 U/L (ref 33–136)
ALT SERPL W P-5'-P-CCNC: 10 U/L (ref 10–52)
ALT SERPL W P-5'-P-CCNC: 8 U/L (ref 10–52)
ANION GAP SERPL CALC-SCNC: 7 MMOL/L (ref 10–20)
APPEARANCE UR: CLEAR
AST SERPL W P-5'-P-CCNC: 14 U/L (ref 9–39)
AST SERPL W P-5'-P-CCNC: 15 U/L (ref 9–39)
ATRIAL RATE: 56 BPM
BASOPHILS # BLD AUTO: 0.04 X10*3/UL (ref 0–0.1)
BASOPHILS NFR BLD AUTO: 0.6 %
BILIRUB DIRECT SERPL-MCNC: 0.1 MG/DL (ref 0–0.3)
BILIRUB SERPL-MCNC: 0.8 MG/DL (ref 0–1.2)
BILIRUB SERPL-MCNC: 0.9 MG/DL (ref 0–1.2)
BILIRUB UR STRIP.AUTO-MCNC: NEGATIVE MG/DL
BNP SERPL-MCNC: 55 PG/ML (ref 0–99)
BNP SERPL-MCNC: 63 PG/ML (ref 0–99)
BUN SERPL-MCNC: 16 MG/DL (ref 6–23)
CALCIUM SERPL-MCNC: 8.7 MG/DL (ref 8.6–10.3)
CARDIAC TROPONIN I PNL SERPL HS: 8 NG/L (ref 0–20)
CARDIAC TROPONIN I PNL SERPL HS: 8 NG/L (ref 0–20)
CHLORIDE SERPL-SCNC: 105 MMOL/L (ref 98–107)
CHOLEST SERPL-MCNC: 134 MG/DL (ref 0–199)
CHOLESTEROL/HDL RATIO: 3
CO2 SERPL-SCNC: 30 MMOL/L (ref 21–32)
COLOR UR: ABNORMAL
CREAT SERPL-MCNC: 1.03 MG/DL (ref 0.5–1.3)
EGFRCR SERPLBLD CKD-EPI 2021: 75 ML/MIN/1.73M*2
EOSINOPHIL # BLD AUTO: 0.02 X10*3/UL (ref 0–0.4)
EOSINOPHIL NFR BLD AUTO: 0.3 %
ERYTHROCYTE [DISTWIDTH] IN BLOOD BY AUTOMATED COUNT: 12.7 % (ref 11.5–14.5)
EST. AVERAGE GLUCOSE BLD GHB EST-MCNC: 123 MG/DL
GLUCOSE BLD MANUAL STRIP-MCNC: 100 MG/DL (ref 74–99)
GLUCOSE SERPL-MCNC: 126 MG/DL (ref 74–99)
GLUCOSE UR STRIP.AUTO-MCNC: NORMAL MG/DL
HBA1C MFR BLD: 5.9 % (ref ?–5.7)
HCT VFR BLD AUTO: 41 % (ref 41–52)
HDLC SERPL-MCNC: 44.2 MG/DL
HGB BLD-MCNC: 13.6 G/DL (ref 13.5–17.5)
IMM GRANULOCYTES # BLD AUTO: 0.02 X10*3/UL (ref 0–0.5)
IMM GRANULOCYTES NFR BLD AUTO: 0.3 % (ref 0–0.9)
INR PPP: 1.1 (ref 0.9–1.1)
KETONES UR STRIP.AUTO-MCNC: NEGATIVE MG/DL
LACTATE SERPL-SCNC: 1 MMOL/L (ref 0.4–2)
LDLC SERPL CALC-MCNC: 76 MG/DL
LEUKOCYTE ESTERASE UR QL STRIP.AUTO: NEGATIVE
LYMPHOCYTES # BLD AUTO: 1.25 X10*3/UL (ref 0.8–3)
LYMPHOCYTES NFR BLD AUTO: 17.9 %
MAGNESIUM SERPL-MCNC: 1.82 MG/DL (ref 1.6–2.4)
MCH RBC QN AUTO: 30.2 PG (ref 26–34)
MCHC RBC AUTO-ENTMCNC: 33.2 G/DL (ref 32–36)
MCV RBC AUTO: 91 FL (ref 80–100)
MONOCYTES # BLD AUTO: 0.41 X10*3/UL (ref 0.05–0.8)
MONOCYTES NFR BLD AUTO: 5.9 %
MUCOUS THREADS #/AREA URNS AUTO: NORMAL /LPF
NEUTROPHILS # BLD AUTO: 5.25 X10*3/UL (ref 1.6–5.5)
NEUTROPHILS NFR BLD AUTO: 75 %
NITRITE UR QL STRIP.AUTO: NEGATIVE
NON HDL CHOLESTEROL: 90 MG/DL (ref 0–149)
NRBC BLD-RTO: 0 /100 WBCS (ref 0–0)
P AXIS: 60 DEGREES
P OFFSET: 180 MS
P ONSET: 130 MS
PH UR STRIP.AUTO: 6 [PH]
PLATELET # BLD AUTO: 155 X10*3/UL (ref 150–450)
POTASSIUM SERPL-SCNC: 4.5 MMOL/L (ref 3.5–5.3)
PR INTERVAL: 188 MS
PROT SERPL-MCNC: 5.4 G/DL (ref 6.4–8.2)
PROT SERPL-MCNC: 6.2 G/DL (ref 6.4–8.2)
PROT UR STRIP.AUTO-MCNC: NEGATIVE MG/DL
PROTHROMBIN TIME: 11.9 SECONDS (ref 9.8–12.4)
Q ONSET: 224 MS
QRS COUNT: 9 BEATS
QRS DURATION: 90 MS
QT INTERVAL: 448 MS
QTC CALCULATION(BAZETT): 432 MS
QTC FREDERICIA: 438 MS
R AXIS: 47 DEGREES
RBC # BLD AUTO: 4.5 X10*6/UL (ref 4.5–5.9)
RBC # UR STRIP.AUTO: ABNORMAL MG/DL
RBC #/AREA URNS AUTO: NORMAL /HPF
SODIUM SERPL-SCNC: 137 MMOL/L (ref 136–145)
SP GR UR STRIP.AUTO: 1.02
SQUAMOUS #/AREA URNS AUTO: NORMAL /HPF
T AXIS: 73 DEGREES
T OFFSET: 448 MS
TRIGL SERPL-MCNC: 67 MG/DL (ref 0–149)
UROBILINOGEN UR STRIP.AUTO-MCNC: NORMAL MG/DL
VENTRICULAR RATE: 56 BPM
VLDL: 13 MG/DL (ref 0–40)
WBC # BLD AUTO: 7 X10*3/UL (ref 4.4–11.3)
WBC #/AREA URNS AUTO: NORMAL /HPF

## 2025-08-12 PROCEDURE — 84484 ASSAY OF TROPONIN QUANT: CPT | Performed by: EMERGENCY MEDICINE

## 2025-08-12 PROCEDURE — 2500000001 HC RX 250 WO HCPCS SELF ADMINISTERED DRUGS (ALT 637 FOR MEDICARE OP): Performed by: EMERGENCY MEDICINE

## 2025-08-12 PROCEDURE — 83605 ASSAY OF LACTIC ACID: CPT | Performed by: EMERGENCY MEDICINE

## 2025-08-12 PROCEDURE — 82947 ASSAY GLUCOSE BLOOD QUANT: CPT

## 2025-08-12 PROCEDURE — 83880 ASSAY OF NATRIURETIC PEPTIDE: CPT | Performed by: INTERNAL MEDICINE

## 2025-08-12 PROCEDURE — 80061 LIPID PANEL: CPT | Performed by: INTERNAL MEDICINE

## 2025-08-12 PROCEDURE — 85025 COMPLETE CBC W/AUTO DIFF WBC: CPT | Performed by: EMERGENCY MEDICINE

## 2025-08-12 PROCEDURE — 70450 CT HEAD/BRAIN W/O DYE: CPT

## 2025-08-12 PROCEDURE — 99223 1ST HOSP IP/OBS HIGH 75: CPT | Performed by: INTERNAL MEDICINE

## 2025-08-12 PROCEDURE — 93005 ELECTROCARDIOGRAM TRACING: CPT

## 2025-08-12 PROCEDURE — G0378 HOSPITAL OBSERVATION PER HR: HCPCS

## 2025-08-12 PROCEDURE — 81003 URINALYSIS AUTO W/O SCOPE: CPT | Performed by: EMERGENCY MEDICINE

## 2025-08-12 PROCEDURE — 83880 ASSAY OF NATRIURETIC PEPTIDE: CPT | Performed by: EMERGENCY MEDICINE

## 2025-08-12 PROCEDURE — 85610 PROTHROMBIN TIME: CPT | Performed by: EMERGENCY MEDICINE

## 2025-08-12 PROCEDURE — 36415 COLL VENOUS BLD VENIPUNCTURE: CPT | Performed by: EMERGENCY MEDICINE

## 2025-08-12 PROCEDURE — 93010 ELECTROCARDIOGRAM REPORT: CPT | Performed by: INTERNAL MEDICINE

## 2025-08-12 PROCEDURE — 2550000001 HC RX 255 CONTRASTS: Performed by: INTERNAL MEDICINE

## 2025-08-12 PROCEDURE — 70496 CT ANGIOGRAPHY HEAD: CPT | Performed by: RADIOLOGY

## 2025-08-12 PROCEDURE — 2500000001 HC RX 250 WO HCPCS SELF ADMINISTERED DRUGS (ALT 637 FOR MEDICARE OP): Performed by: STUDENT IN AN ORGANIZED HEALTH CARE EDUCATION/TRAINING PROGRAM

## 2025-08-12 PROCEDURE — 99285 EMERGENCY DEPT VISIT HI MDM: CPT | Mod: 25 | Performed by: EMERGENCY MEDICINE

## 2025-08-12 PROCEDURE — 99222 1ST HOSP IP/OBS MODERATE 55: CPT | Performed by: STUDENT IN AN ORGANIZED HEALTH CARE EDUCATION/TRAINING PROGRAM

## 2025-08-12 PROCEDURE — 70551 MRI BRAIN STEM W/O DYE: CPT | Performed by: STUDENT IN AN ORGANIZED HEALTH CARE EDUCATION/TRAINING PROGRAM

## 2025-08-12 PROCEDURE — 71045 X-RAY EXAM CHEST 1 VIEW: CPT | Performed by: RADIOLOGY

## 2025-08-12 PROCEDURE — 83036 HEMOGLOBIN GLYCOSYLATED A1C: CPT | Mod: ELYLAB | Performed by: INTERNAL MEDICINE

## 2025-08-12 PROCEDURE — 2500000004 HC RX 250 GENERAL PHARMACY W/ HCPCS (ALT 636 FOR OP/ED): Performed by: EMERGENCY MEDICINE

## 2025-08-12 PROCEDURE — 70498 CT ANGIOGRAPHY NECK: CPT | Performed by: RADIOLOGY

## 2025-08-12 PROCEDURE — 83735 ASSAY OF MAGNESIUM: CPT | Performed by: EMERGENCY MEDICINE

## 2025-08-12 PROCEDURE — 80053 COMPREHEN METABOLIC PANEL: CPT | Performed by: EMERGENCY MEDICINE

## 2025-08-12 PROCEDURE — 95819 EEG AWAKE AND ASLEEP: CPT | Performed by: STUDENT IN AN ORGANIZED HEALTH CARE EDUCATION/TRAINING PROGRAM

## 2025-08-12 PROCEDURE — 70496 CT ANGIOGRAPHY HEAD: CPT

## 2025-08-12 PROCEDURE — 80076 HEPATIC FUNCTION PANEL: CPT | Mod: CCI | Performed by: INTERNAL MEDICINE

## 2025-08-12 PROCEDURE — 4A10X4Z MONITORING OF CENTRAL NERVOUS ELECTRICAL ACTIVITY, EXTERNAL APPROACH: ICD-10-PCS | Performed by: INTERNAL MEDICINE

## 2025-08-12 PROCEDURE — 96360 HYDRATION IV INFUSION INIT: CPT

## 2025-08-12 PROCEDURE — 70551 MRI BRAIN STEM W/O DYE: CPT

## 2025-08-12 PROCEDURE — 71045 X-RAY EXAM CHEST 1 VIEW: CPT

## 2025-08-12 PROCEDURE — 70450 CT HEAD/BRAIN W/O DYE: CPT | Performed by: RADIOLOGY

## 2025-08-12 PROCEDURE — 95819 EEG AWAKE AND ASLEEP: CPT

## 2025-08-12 RX ORDER — ACETAMINOPHEN 325 MG/1
975 TABLET ORAL ONCE
Status: COMPLETED | OUTPATIENT
Start: 2025-08-12 | End: 2025-08-12

## 2025-08-12 RX ORDER — ENOXAPARIN SODIUM 100 MG/ML
40 INJECTION SUBCUTANEOUS EVERY 24 HOURS
Status: DISCONTINUED | OUTPATIENT
Start: 2025-08-12 | End: 2025-08-13 | Stop reason: HOSPADM

## 2025-08-12 RX ORDER — LABETALOL HYDROCHLORIDE 5 MG/ML
10 INJECTION, SOLUTION INTRAVENOUS EVERY 10 MIN PRN
Status: DISCONTINUED | OUTPATIENT
Start: 2025-08-12 | End: 2025-08-13 | Stop reason: HOSPADM

## 2025-08-12 RX ORDER — NAPROXEN SODIUM 220 MG/1
81 TABLET, FILM COATED ORAL DAILY
Status: DISCONTINUED | OUTPATIENT
Start: 2025-08-12 | End: 2025-08-13 | Stop reason: HOSPADM

## 2025-08-12 RX ORDER — ATORVASTATIN CALCIUM 20 MG/1
40 TABLET, FILM COATED ORAL NIGHTLY
Status: DISCONTINUED | OUTPATIENT
Start: 2025-08-12 | End: 2025-08-13 | Stop reason: HOSPADM

## 2025-08-12 RX ADMIN — ACETAMINOPHEN 975 MG: 325 TABLET ORAL at 11:53

## 2025-08-12 RX ADMIN — IOHEXOL 75 ML: 350 INJECTION, SOLUTION INTRAVENOUS at 16:16

## 2025-08-12 RX ADMIN — ATORVASTATIN CALCIUM 40 MG: 20 TABLET, FILM COATED ORAL at 21:05

## 2025-08-12 RX ADMIN — ASPIRIN 81 MG: 81 TABLET, CHEWABLE ORAL at 21:05

## 2025-08-12 RX ADMIN — SODIUM CHLORIDE 500 ML: 0.9 INJECTION, SOLUTION INTRAVENOUS at 09:23

## 2025-08-12 SDOH — SOCIAL STABILITY: SOCIAL INSECURITY: WITHIN THE LAST YEAR, HAVE YOU BEEN AFRAID OF YOUR PARTNER OR EX-PARTNER?: NO

## 2025-08-12 SDOH — SOCIAL STABILITY: SOCIAL INSECURITY: WITHIN THE LAST YEAR, HAVE YOU BEEN HUMILIATED OR EMOTIONALLY ABUSED IN OTHER WAYS BY YOUR PARTNER OR EX-PARTNER?: NO

## 2025-08-12 SDOH — SOCIAL STABILITY: SOCIAL INSECURITY: ARE YOU OR HAVE YOU BEEN THREATENED OR ABUSED PHYSICALLY, EMOTIONALLY, OR SEXUALLY BY ANYONE?: NO

## 2025-08-12 SDOH — ECONOMIC STABILITY: FOOD INSECURITY: WITHIN THE PAST 12 MONTHS, YOU WORRIED THAT YOUR FOOD WOULD RUN OUT BEFORE YOU GOT THE MONEY TO BUY MORE.: NEVER TRUE

## 2025-08-12 SDOH — SOCIAL STABILITY: SOCIAL INSECURITY: DOES ANYONE TRY TO KEEP YOU FROM HAVING/CONTACTING OTHER FRIENDS OR DOING THINGS OUTSIDE YOUR HOME?: NO

## 2025-08-12 SDOH — ECONOMIC STABILITY: FOOD INSECURITY: WITHIN THE PAST 12 MONTHS, THE FOOD YOU BOUGHT JUST DIDN'T LAST AND YOU DIDN'T HAVE MONEY TO GET MORE.: NEVER TRUE

## 2025-08-12 SDOH — SOCIAL STABILITY: SOCIAL INSECURITY
WITHIN THE LAST YEAR, HAVE YOU BEEN RAPED OR FORCED TO HAVE ANY KIND OF SEXUAL ACTIVITY BY YOUR PARTNER OR EX-PARTNER?: NO

## 2025-08-12 SDOH — ECONOMIC STABILITY: INCOME INSECURITY: IN THE PAST 12 MONTHS HAS THE ELECTRIC, GAS, OIL, OR WATER COMPANY THREATENED TO SHUT OFF SERVICES IN YOUR HOME?: NO

## 2025-08-12 SDOH — SOCIAL STABILITY: SOCIAL INSECURITY: ABUSE: ADULT

## 2025-08-12 SDOH — SOCIAL STABILITY: SOCIAL INSECURITY: HAS ANYONE EVER THREATENED TO HURT YOUR FAMILY OR YOUR PETS?: NO

## 2025-08-12 SDOH — SOCIAL STABILITY: SOCIAL INSECURITY: HAVE YOU HAD THOUGHTS OF HARMING ANYONE ELSE?: NO

## 2025-08-12 SDOH — SOCIAL STABILITY: SOCIAL INSECURITY: DO YOU FEEL ANYONE HAS EXPLOITED OR TAKEN ADVANTAGE OF YOU FINANCIALLY OR OF YOUR PERSONAL PROPERTY?: NO

## 2025-08-12 SDOH — SOCIAL STABILITY: SOCIAL INSECURITY: WERE YOU ABLE TO COMPLETE ALL THE BEHAVIORAL HEALTH SCREENINGS?: YES

## 2025-08-12 SDOH — SOCIAL STABILITY: SOCIAL INSECURITY: DO YOU FEEL UNSAFE GOING BACK TO THE PLACE WHERE YOU ARE LIVING?: NO

## 2025-08-12 SDOH — SOCIAL STABILITY: SOCIAL INSECURITY: ARE THERE ANY APPARENT SIGNS OF INJURIES/BEHAVIORS THAT COULD BE RELATED TO ABUSE/NEGLECT?: NO

## 2025-08-12 ASSESSMENT — ACTIVITIES OF DAILY LIVING (ADL)
FEEDING YOURSELF: INDEPENDENT
BATHING: INDEPENDENT
HEARING - RIGHT EAR: FUNCTIONAL
ADEQUATE_TO_COMPLETE_ADL: YES
TOILETING: INDEPENDENT
LACK_OF_TRANSPORTATION: NO
JUDGMENT_ADEQUATE_SAFELY_COMPLETE_DAILY_ACTIVITIES: YES
PATIENT'S MEMORY ADEQUATE TO SAFELY COMPLETE DAILY ACTIVITIES?: YES
DRESSING YOURSELF: INDEPENDENT
HEARING - LEFT EAR: FUNCTIONAL
WALKS IN HOME: INDEPENDENT
GROOMING: INDEPENDENT

## 2025-08-12 ASSESSMENT — COGNITIVE AND FUNCTIONAL STATUS - GENERAL
DAILY ACTIVITIY SCORE: 24
MOBILITY SCORE: 24
PATIENT BASELINE BEDBOUND: NO

## 2025-08-12 ASSESSMENT — PATIENT HEALTH QUESTIONNAIRE - PHQ9
SUM OF ALL RESPONSES TO PHQ9 QUESTIONS 1 & 2: 0
1. LITTLE INTEREST OR PLEASURE IN DOING THINGS: NOT AT ALL
2. FEELING DOWN, DEPRESSED OR HOPELESS: NOT AT ALL

## 2025-08-12 ASSESSMENT — LIFESTYLE VARIABLES
HOW OFTEN DO YOU HAVE A DRINK CONTAINING ALCOHOL: NEVER
HOW MANY STANDARD DRINKS CONTAINING ALCOHOL DO YOU HAVE ON A TYPICAL DAY: PATIENT DOES NOT DRINK
AUDIT-C TOTAL SCORE: 0
HOW OFTEN DO YOU HAVE 6 OR MORE DRINKS ON ONE OCCASION: NEVER
SKIP TO QUESTIONS 9-10: 1
AUDIT-C TOTAL SCORE: 0

## 2025-08-12 ASSESSMENT — PAIN SCALES - GENERAL
PAINLEVEL_OUTOF10: 1
PAINLEVEL_OUTOF10: 0 - NO PAIN
PAINLEVEL_OUTOF10: 3

## 2025-08-12 ASSESSMENT — ENCOUNTER SYMPTOMS
CARDIOVASCULAR NEGATIVE: 1
MUSCULOSKELETAL NEGATIVE: 1
ENDOCRINE NEGATIVE: 1
GASTROINTESTINAL NEGATIVE: 1
EYES NEGATIVE: 1
ALLERGIC/IMMUNOLOGIC NEGATIVE: 1
PSYCHIATRIC NEGATIVE: 1
RESPIRATORY NEGATIVE: 1
CONSTITUTIONAL NEGATIVE: 1

## 2025-08-12 ASSESSMENT — PAIN DESCRIPTION - DESCRIPTORS: DESCRIPTORS: ACHING

## 2025-08-12 ASSESSMENT — PAIN - FUNCTIONAL ASSESSMENT
PAIN_FUNCTIONAL_ASSESSMENT: 0-10
PAIN_FUNCTIONAL_ASSESSMENT: 0-10

## 2025-08-12 ASSESSMENT — PAIN DESCRIPTION - LOCATION: LOCATION: HEAD

## 2025-08-12 ASSESSMENT — PAIN DESCRIPTION - PAIN TYPE: TYPE: ACUTE PAIN

## 2025-08-13 ENCOUNTER — APPOINTMENT (OUTPATIENT)
Dept: CARDIOLOGY | Facility: HOSPITAL | Age: 75
End: 2025-08-13
Payer: MEDICARE

## 2025-08-13 VITALS
SYSTOLIC BLOOD PRESSURE: 102 MMHG | WEIGHT: 212.74 LBS | HEART RATE: 56 BPM | OXYGEN SATURATION: 96 % | TEMPERATURE: 99.1 F | HEIGHT: 74 IN | RESPIRATION RATE: 18 BRPM | BODY MASS INDEX: 27.3 KG/M2 | DIASTOLIC BLOOD PRESSURE: 66 MMHG

## 2025-08-13 LAB
AORTIC VALVE MEAN GRADIENT: 5 MMHG
AORTIC VALVE PEAK VELOCITY: 1.62 M/S
ATRIAL RATE: 57 BPM
AV PEAK GRADIENT: 10 MMHG
AVA (PEAK VEL): 2.39 CM2
AVA (VTI): 2.58 CM2
CHOLEST SERPL-MCNC: 148 MG/DL (ref 0–199)
CHOLESTEROL/HDL RATIO: 3.2
EJECTION FRACTION APICAL 4 CHAMBER: 67.8
EJECTION FRACTION: 68 %
GLUCOSE BLD MANUAL STRIP-MCNC: 130 MG/DL (ref 74–99)
GLUCOSE BLD MANUAL STRIP-MCNC: 137 MG/DL (ref 74–99)
HDLC SERPL-MCNC: 46.2 MG/DL
HOLD SPECIMEN: NORMAL
LDLC SERPL CALC-MCNC: 86 MG/DL
LEFT ATRIUM VOLUME AREA LENGTH INDEX BSA: 25.7 ML/M2
LEFT VENTRICLE INTERNAL DIMENSION DIASTOLE: 5.9 CM (ref 3.5–6)
LEFT VENTRICULAR OUTFLOW TRACT DIAMETER: 2.1 CM
LV EJECTION FRACTION BIPLANE: 68 %
MITRAL VALVE E/A RATIO: 0.96
NON HDL CHOLESTEROL: 102 MG/DL (ref 0–149)
P AXIS: -13 DEGREES
P OFFSET: 201 MS
P ONSET: 141 MS
PR INTERVAL: 166 MS
Q ONSET: 224 MS
QRS COUNT: 9 BEATS
QRS DURATION: 90 MS
QT INTERVAL: 432 MS
QTC CALCULATION(BAZETT): 420 MS
QTC FREDERICIA: 424 MS
R AXIS: 37 DEGREES
RIGHT VENTRICLE FREE WALL PEAK S': 24.2 CM/S
RIGHT VENTRICLE PEAK SYSTOLIC PRESSURE: 21 MMHG
T AXIS: 83 DEGREES
T OFFSET: 440 MS
TRICUSPID ANNULAR PLANE SYSTOLIC EXCURSION: 2.9 CM
TRIGL SERPL-MCNC: 81 MG/DL (ref 0–149)
VENTRICULAR RATE: 57 BPM
VLDL: 16 MG/DL (ref 0–40)

## 2025-08-13 PROCEDURE — 2500000004 HC RX 250 GENERAL PHARMACY W/ HCPCS (ALT 636 FOR OP/ED): Performed by: STUDENT IN AN ORGANIZED HEALTH CARE EDUCATION/TRAINING PROGRAM

## 2025-08-13 PROCEDURE — 82947 ASSAY GLUCOSE BLOOD QUANT: CPT

## 2025-08-13 PROCEDURE — C8929 TTE W OR WO FOL WCON,DOPPLER: HCPCS

## 2025-08-13 PROCEDURE — 99232 SBSQ HOSP IP/OBS MODERATE 35: CPT | Performed by: STUDENT IN AN ORGANIZED HEALTH CARE EDUCATION/TRAINING PROGRAM

## 2025-08-13 PROCEDURE — 93306 TTE W/DOPPLER COMPLETE: CPT | Performed by: INTERNAL MEDICINE

## 2025-08-13 PROCEDURE — 1200000002 HC GENERAL ROOM WITH TELEMETRY DAILY

## 2025-08-13 PROCEDURE — 2500000001 HC RX 250 WO HCPCS SELF ADMINISTERED DRUGS (ALT 637 FOR MEDICARE OP): Performed by: STUDENT IN AN ORGANIZED HEALTH CARE EDUCATION/TRAINING PROGRAM

## 2025-08-13 PROCEDURE — 36415 COLL VENOUS BLD VENIPUNCTURE: CPT | Performed by: STUDENT IN AN ORGANIZED HEALTH CARE EDUCATION/TRAINING PROGRAM

## 2025-08-13 PROCEDURE — 97165 OT EVAL LOW COMPLEX 30 MIN: CPT | Mod: GO

## 2025-08-13 PROCEDURE — 97161 PT EVAL LOW COMPLEX 20 MIN: CPT | Mod: GP | Performed by: PHYSICAL THERAPIST

## 2025-08-13 PROCEDURE — 80061 LIPID PANEL: CPT | Performed by: STUDENT IN AN ORGANIZED HEALTH CARE EDUCATION/TRAINING PROGRAM

## 2025-08-13 PROCEDURE — 2500000001 HC RX 250 WO HCPCS SELF ADMINISTERED DRUGS (ALT 637 FOR MEDICARE OP): Performed by: INTERNAL MEDICINE

## 2025-08-13 PROCEDURE — 99239 HOSP IP/OBS DSCHRG MGMT >30: CPT | Performed by: INTERNAL MEDICINE

## 2025-08-13 RX ORDER — ATORVASTATIN CALCIUM 40 MG/1
40 TABLET, FILM COATED ORAL NIGHTLY
Qty: 30 TABLET | Refills: 0 | Status: SHIPPED | OUTPATIENT
Start: 2025-08-13 | End: 2025-09-12

## 2025-08-13 RX ORDER — ACETAMINOPHEN 325 MG/1
650 TABLET ORAL EVERY 4 HOURS PRN
Status: DISCONTINUED | OUTPATIENT
Start: 2025-08-13 | End: 2025-08-13 | Stop reason: HOSPADM

## 2025-08-13 RX ORDER — ACETAMINOPHEN 160 MG/5ML
650 SOLUTION ORAL EVERY 4 HOURS PRN
Status: DISCONTINUED | OUTPATIENT
Start: 2025-08-13 | End: 2025-08-13 | Stop reason: HOSPADM

## 2025-08-13 RX ORDER — ACETAMINOPHEN 650 MG/1
650 SUPPOSITORY RECTAL EVERY 4 HOURS PRN
Status: DISCONTINUED | OUTPATIENT
Start: 2025-08-13 | End: 2025-08-13 | Stop reason: HOSPADM

## 2025-08-13 RX ORDER — NAPROXEN SODIUM 220 MG/1
81 TABLET, FILM COATED ORAL DAILY
Qty: 30 TABLET | Refills: 0 | Status: SHIPPED | OUTPATIENT
Start: 2025-08-14

## 2025-08-13 RX ADMIN — ACETAMINOPHEN 650 MG: 325 TABLET ORAL at 09:48

## 2025-08-13 RX ADMIN — PERFLUTREN 2 ML OF DILUTION: 6.52 INJECTION, SUSPENSION INTRAVENOUS at 09:22

## 2025-08-13 RX ADMIN — ASPIRIN 81 MG: 81 TABLET, CHEWABLE ORAL at 09:48

## 2025-08-13 ASSESSMENT — COGNITIVE AND FUNCTIONAL STATUS - GENERAL
MOBILITY SCORE: 24
DAILY ACTIVITIY SCORE: 24
MOBILITY SCORE: 24
DAILY ACTIVITIY SCORE: 24

## 2025-08-13 ASSESSMENT — PAIN - FUNCTIONAL ASSESSMENT
PAIN_FUNCTIONAL_ASSESSMENT: 0-10
PAIN_FUNCTIONAL_ASSESSMENT: 0-10

## 2025-08-13 ASSESSMENT — PAIN SCALES - GENERAL
PAINLEVEL_OUTOF10: 2
PAINLEVEL_OUTOF10: 2
PAINLEVEL_OUTOF10: 0 - NO PAIN

## 2025-08-13 ASSESSMENT — ACTIVITIES OF DAILY LIVING (ADL): BATHING_ASSISTANCE: INDEPENDENT

## 2025-08-14 ENCOUNTER — PATIENT OUTREACH (OUTPATIENT)
Dept: PRIMARY CARE | Facility: CLINIC | Age: 75
End: 2025-08-14
Payer: MEDICARE

## 2025-08-14 LAB
HOLD SPECIMEN: NORMAL
HOLD SPECIMEN: NORMAL

## 2025-08-15 LAB
ATRIAL RATE: 52 BPM
P AXIS: 67 DEGREES
P OFFSET: 180 MS
P ONSET: 132 MS
PR INTERVAL: 188 MS
Q ONSET: 226 MS
QRS COUNT: 9 BEATS
QRS DURATION: 92 MS
QT INTERVAL: 418 MS
QTC CALCULATION(BAZETT): 388 MS
QTC FREDERICIA: 398 MS
R AXIS: 19 DEGREES
T AXIS: 91 DEGREES
T OFFSET: 435 MS
VENTRICULAR RATE: 52 BPM

## 2025-08-21 ENCOUNTER — OFFICE VISIT (OUTPATIENT)
Dept: PRIMARY CARE | Facility: CLINIC | Age: 75
End: 2025-08-21
Payer: MEDICARE

## 2025-08-21 VITALS
OXYGEN SATURATION: 98 % | WEIGHT: 203.13 LBS | RESPIRATION RATE: 16 BRPM | BODY MASS INDEX: 26.08 KG/M2 | SYSTOLIC BLOOD PRESSURE: 131 MMHG | HEART RATE: 64 BPM | TEMPERATURE: 97.8 F | DIASTOLIC BLOOD PRESSURE: 75 MMHG

## 2025-08-21 DIAGNOSIS — Z09 HOSPITAL DISCHARGE FOLLOW-UP: Primary | ICD-10-CM

## 2025-08-21 DIAGNOSIS — I63.9 ACUTE CVA (CEREBROVASCULAR ACCIDENT) (MULTI): ICD-10-CM

## 2025-08-28 ENCOUNTER — PATIENT OUTREACH (OUTPATIENT)
Dept: PRIMARY CARE | Facility: CLINIC | Age: 75
End: 2025-08-28
Payer: MEDICARE

## 2025-09-22 ENCOUNTER — APPOINTMENT (OUTPATIENT)
Dept: NEUROLOGY | Facility: CLINIC | Age: 75
End: 2025-09-22
Payer: MEDICARE

## 2025-10-17 ENCOUNTER — APPOINTMENT (OUTPATIENT)
Dept: PRIMARY CARE | Facility: CLINIC | Age: 75
End: 2025-10-17
Payer: MEDICARE

## 2026-01-02 ENCOUNTER — APPOINTMENT (OUTPATIENT)
Dept: PRIMARY CARE | Facility: CLINIC | Age: 76
End: 2026-01-02
Payer: MEDICARE

## (undated) DEVICE — MARKER SURG SKIN GENTIAN VLT REG TIP W/ 6IN RUL DYNJSM01

## (undated) DEVICE — SUTURE VCRL SZ 0 L36IN ABSRB UD L36MM CT-1 1/2 CIR J946H

## (undated) DEVICE — DRESSING HYDROFIBER AQUACEL AG ADVANTAGE 3.5X12 IN

## (undated) DEVICE — BLADE,CARBON-STEEL,10,STRL,DISPOSABLE,TB: Brand: MEDLINE

## (undated) DEVICE — SHEET,DRAPE,53X77,STERILE: Brand: MEDLINE

## (undated) DEVICE — GOWN,AURORA,NONREINFORCED,LARGE: Brand: MEDLINE

## (undated) DEVICE — SINGLE PORT MANIFOLD: Brand: NEPTUNE 2

## (undated) DEVICE — GLOVE ORANGE PI 8   MSG9080

## (undated) DEVICE — COUNTER NDL 40 COUNT HLD 70 FOAM BLK ADH W/ MAG

## (undated) DEVICE — SPLINT KNEE UNIV FOR LESS THAN 36IN L20IN FOAM LAM E CNTCT

## (undated) DEVICE — SUTURE VCRL SZ 2-0 L27IN ABSRB UD L26MM CT-2 1/2 CIR J269H

## (undated) DEVICE — ZIMMER® STERILE DISPOSABLE TOURNIQUET CUFF, DUAL PORT, SINGLE BLADDER, 34 IN. (86 CM)

## (undated) DEVICE — COVER LT HNDL BLU PLAS

## (undated) DEVICE — SUTURE MCRYL SZ 4-0 L27IN ABSRB UD L19MM PS-2 1/2 CIR PRIM Y426H

## (undated) DEVICE — NEEDLE SUT T-5 L26.5MM 1/2 CIR TAPR W/ NIT LOOP

## (undated) DEVICE — APPLICATOR MEDICATED 26 CC SOLUTION HI LT ORNG CHLORAPREP

## (undated) DEVICE — SYRINGE IRRIG 60ML SFT PLIABLE BLB EZ TO GRP 1 HND USE W/

## (undated) DEVICE — 3M™ IOBAN™ 2 ANTIMICROBIAL INCISE DRAPE 6650EZ: Brand: IOBAN™ 2

## (undated) DEVICE — NEPTUNE E-SEP SMOKE EVACUATION PENCIL, COATED, 70MM BLADE, PUSH BUTTON SWITCH: Brand: NEPTUNE E-SEP

## (undated) DEVICE — GLOVE ORANGE PI 7 1/2   MSG9075

## (undated) DEVICE — PACK,ARTHROSCOPY II,SIRUS: Brand: MEDLINE

## (undated) DEVICE — DRESSING HYDROFIBER AQUACEL AG ADVANTAGE 3.5X10 IN

## (undated) DEVICE — LIQUIBAND RAPID ADHESIVE 36/CS 0.8ML: Brand: MEDLINE

## (undated) DEVICE — SPLINT ORTH 22IN KNEE BASIC

## (undated) DEVICE — SUTURE SUTTAPE L40IN DIA1.3MM NONABSORBABLE WHT BLU L26.5MM AR7500

## (undated) DEVICE — SUTURE ETHBND EXCEL SZ 5 L30IN NONABSORBABLE GRN L48MM V-40 MB46G

## (undated) DEVICE — BANDAGE COMPR M W6INXL10YD WHT BGE VELC E MTRX HK AND LOOP

## (undated) DEVICE — GOWN,SIRUS,NONRNF,SETINSLV,2XL,18/CS: Brand: MEDLINE

## (undated) DEVICE — SUTURE N ABSRB 5-0 1.7 MM W/NDL TAPE WHT BLU AR7511

## (undated) DEVICE — SPONGE,LAP,18"X18",DLX,XR,ST,5/PK,40/PK: Brand: MEDLINE

## (undated) DEVICE — BANDAGE COBAN 4 IN COMPR W4INXL5YD FOAM COHESIVE QUIK STK SELF ADH SFT

## (undated) DEVICE — TOWEL,OR,DSP,ST,BLUE,STD,4/PK,20PK/CS: Brand: MEDLINE

## (undated) DEVICE — PADDING CAST W6INXL4YD POLY POR SPUN DACRON NON STERILE

## (undated) DEVICE — GLOVE ORANGE PI 8 1/2   MSG9085

## (undated) DEVICE — TUBING, SUCTION, 9/32" X 12', STRAIGHT: Brand: MEDLINE INDUSTRIES, INC.

## (undated) DEVICE — KIT INSTR TRANSTIBIAL CRUCE W/O SAW BLDE DISP

## (undated) DEVICE — ELECTRODE PT RET AD L9FT HI MOIST COND ADH HYDRGEL CORDED